# Patient Record
Sex: FEMALE | Race: BLACK OR AFRICAN AMERICAN | Employment: OTHER | URBAN - METROPOLITAN AREA
[De-identification: names, ages, dates, MRNs, and addresses within clinical notes are randomized per-mention and may not be internally consistent; named-entity substitution may affect disease eponyms.]

---

## 2017-01-01 ENCOUNTER — HOSPITAL ENCOUNTER (EMERGENCY)
Facility: HOSPITAL | Age: 42
Discharge: HOME | End: 2017-01-01
Attending: EMERGENCY MEDICINE

## 2017-01-01 ENCOUNTER — APPOINTMENT (OUTPATIENT)
Dept: GENERAL RADIOLOGY | Facility: HOSPITAL | Age: 42
End: 2017-01-01
Attending: EMERGENCY MEDICINE

## 2017-01-01 ENCOUNTER — APPOINTMENT (OUTPATIENT)
Dept: CT IMAGING | Facility: HOSPITAL | Age: 42
End: 2017-01-01
Attending: EMERGENCY MEDICINE

## 2017-01-01 VITALS
HEART RATE: 116 BPM | RESPIRATION RATE: 29 BRPM | BODY MASS INDEX: 37.98 KG/M2 | SYSTOLIC BLOOD PRESSURE: 119 MMHG | HEIGHT: 67 IN | TEMPERATURE: 98 F | OXYGEN SATURATION: 93 % | DIASTOLIC BLOOD PRESSURE: 58 MMHG | WEIGHT: 242 LBS

## 2017-01-01 DIAGNOSIS — R07.89 CHEST WALL PAIN: Primary | ICD-10-CM

## 2017-01-01 DIAGNOSIS — D50.9 IRON DEFICIENCY ANEMIA, UNSPECIFIED IRON DEFICIENCY ANEMIA TYPE: ICD-10-CM

## 2017-01-01 LAB
ANION GAP SERPL CALC-SCNC: 8 MMOL/L (ref 0–18)
BASOPHILS # BLD: 0.1 K/UL (ref 0–0.2)
BASOPHILS NFR BLD: 1 %
BUN SERPL-MCNC: 8 MG/DL (ref 8–20)
BUN/CREAT SERPL: 11 (ref 10–20)
CALCIUM SERPL-MCNC: 9 MG/DL (ref 8.5–10.5)
CHLORIDE SERPL-SCNC: 104 MMOL/L (ref 95–110)
CO2 SERPL-SCNC: 27 MMOL/L (ref 22–32)
CREAT SERPL-MCNC: 0.73 MG/DL (ref 0.5–1.5)
D DIMER PPP FEU-MCNC: 1.79 MCG/ML
EOSINOPHIL # BLD: 0.1 K/UL (ref 0–0.7)
EOSINOPHIL NFR BLD: 2 %
ERYTHROCYTE [DISTWIDTH] IN BLOOD BY AUTOMATED COUNT: 15.2 % (ref 11–15)
GLUCOSE SERPL-MCNC: 101 MG/DL (ref 70–99)
HCT VFR BLD AUTO: 26.9 % (ref 35–48)
HGB BLD-MCNC: 8.8 G/DL (ref 12–16)
LYMPHOCYTES # BLD: 1.7 K/UL (ref 1–4)
LYMPHOCYTES NFR BLD: 27 %
MCH RBC QN AUTO: 28.3 PG (ref 27–32)
MCHC RBC AUTO-ENTMCNC: 32.6 G/DL (ref 32–37)
MCV RBC AUTO: 86.7 FL (ref 80–100)
MONOCYTES # BLD: 0.4 K/UL (ref 0–1)
MONOCYTES NFR BLD: 7 %
NEUTROPHILS # BLD AUTO: 4 K/UL (ref 1.8–7.7)
NEUTROPHILS NFR BLD: 63 %
OSMOLALITY UR CALC.SUM OF ELEC: 286 MOSM/KG (ref 275–295)
PLATELET # BLD AUTO: 231 K/UL (ref 140–400)
PMV BLD AUTO: 8.3 FL (ref 7.4–10.3)
POTASSIUM SERPL-SCNC: 4 MMOL/L (ref 3.3–5.1)
RBC # BLD AUTO: 3.1 M/UL (ref 3.7–5.4)
SODIUM SERPL-SCNC: 139 MMOL/L (ref 136–144)
TROPONIN I SERPL-MCNC: 0 NG/ML (ref ?–0.03)
WBC # BLD AUTO: 6.3 K/UL (ref 4–11)

## 2017-01-01 PROCEDURE — 93005 ELECTROCARDIOGRAM TRACING: CPT

## 2017-01-01 PROCEDURE — 80048 BASIC METABOLIC PNL TOTAL CA: CPT | Performed by: EMERGENCY MEDICINE

## 2017-01-01 PROCEDURE — 96374 THER/PROPH/DIAG INJ IV PUSH: CPT

## 2017-01-01 PROCEDURE — 71020 XR CHEST PA + LAT CHEST (CPT=71020): CPT

## 2017-01-01 PROCEDURE — 85025 COMPLETE CBC W/AUTO DIFF WBC: CPT | Performed by: EMERGENCY MEDICINE

## 2017-01-01 PROCEDURE — 93010 ELECTROCARDIOGRAM REPORT: CPT | Performed by: EMERGENCY MEDICINE

## 2017-01-01 PROCEDURE — 99285 EMERGENCY DEPT VISIT HI MDM: CPT

## 2017-01-01 PROCEDURE — 71260 CT THORAX DX C+: CPT

## 2017-01-01 PROCEDURE — 84484 ASSAY OF TROPONIN QUANT: CPT | Performed by: EMERGENCY MEDICINE

## 2017-01-01 PROCEDURE — 85379 FIBRIN DEGRADATION QUANT: CPT | Performed by: EMERGENCY MEDICINE

## 2017-01-01 RX ORDER — KETOROLAC TROMETHAMINE 30 MG/ML
30 INJECTION, SOLUTION INTRAMUSCULAR; INTRAVENOUS ONCE
Status: COMPLETED | OUTPATIENT
Start: 2017-01-01 | End: 2017-01-01

## 2017-01-01 RX ORDER — NAPROXEN 500 MG/1
500 TABLET ORAL 2 TIMES DAILY PRN
Qty: 20 TABLET | Refills: 0 | Status: SHIPPED | OUTPATIENT
Start: 2017-01-01 | End: 2017-01-08

## 2017-01-01 NOTE — ED NOTES
Lab draw unsuccessful (ERT Maryln Rota attempted).  Pt assigned to chairs outside of T3 - instructed to alert RN if feeling worse in any way

## 2017-01-01 NOTE — ED PROVIDER NOTES
Patient presents with:  Chest Pain Angina (cardiovascular)      HPI:     Jonas Salgado is a 39year old female presents for a chief complaint of chest pain x 2 days.   Exacerbating factors include nothing  Relieving factors include ibuprofen  Pain is loca 100%  LMP 10/15/2016    Constitutional: NAD, awake and alert  HENT: mmm, no lesions,  Neck: normal range of motion, no tenderness, supple. No JVD  Eyes: PERRL, EOMI, conjunctiva normal, no discharge. Sclera anicteric.   Cardiovascular: Normal heart rate, no Andi    Cardiac Monitor: Pulse Readings from Last 1 Encounters:  01/01/17 : 75  , sinus     Radiology findings:   Xr Chest Pa + Lat Chest (cpt=71020)    1/1/2017  CONCLUSION: Normal examination. Pt's pain improved after toradol.   Pt currently on iro

## 2017-01-01 NOTE — ED INITIAL ASSESSMENT (HPI)
C/o pain across upper chest, back & shoulders intermittent since Thursday night but worsening last night  Hx of miscarriage on 16 -  was approx 8 weeks pregnant   States pain is positional - exacerbates when she turns her neck from side-to-side

## 2017-02-03 ENCOUNTER — OFFICE VISIT (OUTPATIENT)
Dept: INTERNAL MEDICINE CLINIC | Facility: CLINIC | Age: 42
End: 2017-02-03

## 2017-02-03 VITALS
DIASTOLIC BLOOD PRESSURE: 60 MMHG | OXYGEN SATURATION: 97 % | SYSTOLIC BLOOD PRESSURE: 110 MMHG | TEMPERATURE: 98 F | RESPIRATION RATE: 17 BRPM | WEIGHT: 246 LBS | HEIGHT: 65.5 IN | HEART RATE: 82 BPM | BODY MASS INDEX: 40.49 KG/M2

## 2017-02-03 DIAGNOSIS — B00.1 HERPES SIMPLEX LABIALIS: Primary | ICD-10-CM

## 2017-02-03 DIAGNOSIS — Z20.2 POSSIBLE EXPOSURE TO STD: ICD-10-CM

## 2017-02-03 DIAGNOSIS — E66.01 MORBID OBESITY DUE TO EXCESS CALORIES (HCC): ICD-10-CM

## 2017-02-03 DIAGNOSIS — S89.91XA KNEE INJURY, RIGHT, INITIAL ENCOUNTER: ICD-10-CM

## 2017-02-03 PROCEDURE — 99214 OFFICE O/P EST MOD 30 MIN: CPT | Performed by: INTERNAL MEDICINE

## 2017-02-03 PROCEDURE — 87491 CHLMYD TRACH DNA AMP PROBE: CPT | Performed by: INTERNAL MEDICINE

## 2017-02-03 PROCEDURE — 36415 COLL VENOUS BLD VENIPUNCTURE: CPT | Performed by: INTERNAL MEDICINE

## 2017-02-03 PROCEDURE — 86780 TREPONEMA PALLIDUM: CPT | Performed by: INTERNAL MEDICINE

## 2017-02-03 PROCEDURE — 87389 HIV-1 AG W/HIV-1&-2 AB AG IA: CPT | Performed by: INTERNAL MEDICINE

## 2017-02-03 PROCEDURE — 87591 N.GONORRHOEAE DNA AMP PROB: CPT | Performed by: INTERNAL MEDICINE

## 2017-02-03 RX ORDER — IBUPROFEN 800 MG/1
800 TABLET ORAL EVERY 6 HOURS PRN
Qty: 45 TABLET | Refills: 3 | Status: SHIPPED | OUTPATIENT
Start: 2017-02-03 | End: 2017-07-21 | Stop reason: ALTCHOICE

## 2017-02-03 RX ORDER — ACYCLOVIR 200 MG/1
200 CAPSULE ORAL
Qty: 25 CAPSULE | Refills: 0 | Status: SHIPPED | OUTPATIENT
Start: 2017-02-03 | End: 2017-07-21

## 2017-02-03 RX ORDER — FAMOTIDINE 20 MG/1
20 TABLET ORAL 2 TIMES DAILY
Qty: 20 TABLET | Refills: 0 | Status: SHIPPED | OUTPATIENT
Start: 2017-02-03 | End: 2018-01-22

## 2017-02-03 NOTE — PROGRESS NOTES
Non fasting blood draw administered in left arm   Draw successful   Guilherme:  HIV,TREP,GC  D. O.B verified   Ordering Dr: Swapnil Thayer

## 2017-02-03 NOTE — PROGRESS NOTES
HPI:    Patient ID: Alison Alcocer is a 39year old female. Knee Pain   The pain is present in the right knee. This is a new problem. Episode onset: 8 weeks. History of extremity trauma: occurred after running. The problem occurs constantly.  The proble EXAM:   Physical Exam   Constitutional: She is oriented to person, place, and time. She appears well-developed and well-nourished. Morbidly obese. HENT:   Head: Normocephalic. Blister  Lower lip Left.     Eyes: Conjunctivae are normal. Pupils are equ Referrals:  BARIATRICS - INTERNAL  ORTHOPEDIC - INTERNAL  XR KNEE (1 OR 2 VIEWS), RIGHT (CPT=73560)       OX#[id number]

## 2017-02-05 LAB
C TRACH DNA SPEC QL NAA+PROBE: NEGATIVE
N GONORRHOEA DNA SPEC QL NAA+PROBE: NEGATIVE

## 2017-02-06 LAB
HIV1+2 AB SERPL QL IA: NONREACTIVE
T PALLIDUM AB SER QL: NEGATIVE

## 2017-03-20 ENCOUNTER — OFFICE VISIT (OUTPATIENT)
Dept: PHYSICAL THERAPY | Facility: HOSPITAL | Age: 42
End: 2017-03-20
Attending: INTERNAL MEDICINE
Payer: MEDICARE

## 2017-03-20 DIAGNOSIS — M22.2X1 PATELLOFEMORAL SYNDROME, RIGHT: Primary | ICD-10-CM

## 2017-03-20 PROCEDURE — 97110 THERAPEUTIC EXERCISES: CPT

## 2017-03-20 PROCEDURE — 97161 PT EVAL LOW COMPLEX 20 MIN: CPT

## 2017-03-20 NOTE — PROGRESS NOTES
KNEE EVALUATION:   Referring Physician: Dr. Komal Webb  DX:  Patellofemoral syndrome, right (M22.2X1)      Date of Service: 3/20/2017     PATIENT SUMMARY:   Jackie Washington is a 39year old y/o female who presents to therapy today  with complaints of pain Gait:  slightly limping gait with decreased WB R LE. Palpation: TTP medial hamstring and LCL. Patellat orientation: R: lateral glide/lateral tilt, inferior tilt. Observation:  Edema: L Knee suprapatella: 54.5 cm.   R knee suprapatella: 57 cm   Sens period. Treatment will include: Gait training, Manual Therapy, Neuromuscular Re-education, Therapeutic Activities, Therapeutic Exercise, Home Exercise Program instruction and Modalities to include: Electrical stimulation (unattended) PRN.     Education or

## 2017-03-23 ENCOUNTER — OFFICE VISIT (OUTPATIENT)
Dept: PHYSICAL THERAPY | Facility: HOSPITAL | Age: 42
End: 2017-03-23
Attending: INTERNAL MEDICINE
Payer: MEDICARE

## 2017-03-23 DIAGNOSIS — M22.2X1 PATELLOFEMORAL SYNDROME, RIGHT: Primary | ICD-10-CM

## 2017-03-23 PROCEDURE — 97110 THERAPEUTIC EXERCISES: CPT

## 2017-03-23 NOTE — PROGRESS NOTES
DX:  Patellofemoral syndrome, right (M22.2X1)  Authorized # of Visits:  2/8 (per POC)         Next MD visit: none scheduled  Fall Risk: standard         Precautions: n/a         Medication Changes since last visit?: No  Subjective: Pt states that she could

## 2017-03-28 ENCOUNTER — OFFICE VISIT (OUTPATIENT)
Dept: PHYSICAL THERAPY | Facility: HOSPITAL | Age: 42
End: 2017-03-28
Attending: INTERNAL MEDICINE
Payer: MEDICARE

## 2017-03-28 DIAGNOSIS — M22.2X1 PATELLOFEMORAL SYNDROME, RIGHT: Primary | ICD-10-CM

## 2017-03-28 PROCEDURE — 97110 THERAPEUTIC EXERCISES: CPT

## 2017-03-28 NOTE — PROGRESS NOTES
DX:  Patellofemoral syndrome, right (M22.2X1)  Authorized # of Visits:  3/8 (per POC)         Next MD visit: none scheduled  Fall Risk: standard         Precautions: n/a         Medication Changes since last visit?: No  Subjective: Pt states that her knee

## 2017-03-30 ENCOUNTER — APPOINTMENT (OUTPATIENT)
Dept: PHYSICAL THERAPY | Facility: HOSPITAL | Age: 42
End: 2017-03-30
Attending: INTERNAL MEDICINE
Payer: MEDICARE

## 2017-04-03 ENCOUNTER — OFFICE VISIT (OUTPATIENT)
Dept: PHYSICAL THERAPY | Facility: HOSPITAL | Age: 42
End: 2017-04-03
Attending: INTERNAL MEDICINE
Payer: MEDICARE

## 2017-04-03 DIAGNOSIS — M22.2X1 PATELLOFEMORAL SYNDROME, RIGHT: Primary | ICD-10-CM

## 2017-04-03 PROCEDURE — 97110 THERAPEUTIC EXERCISES: CPT

## 2017-04-03 NOTE — PROGRESS NOTES
DX:  Patellofemoral syndrome, right (M22.2X1)  Authorized # of Visits:  4/8 (per POC)         Next MD visit: none scheduled  Fall Risk: standard         Precautions: n/a         Medication Changes since last visit?: No  Subjective: Pt states that she only

## 2017-04-05 ENCOUNTER — APPOINTMENT (OUTPATIENT)
Dept: PHYSICAL THERAPY | Facility: HOSPITAL | Age: 42
End: 2017-04-05
Attending: INTERNAL MEDICINE
Payer: MEDICARE

## 2017-04-11 ENCOUNTER — OFFICE VISIT (OUTPATIENT)
Dept: PHYSICAL THERAPY | Facility: HOSPITAL | Age: 42
End: 2017-04-11
Attending: INTERNAL MEDICINE
Payer: MEDICARE

## 2017-04-11 DIAGNOSIS — M22.2X1 PATELLOFEMORAL SYNDROME, RIGHT: Primary | ICD-10-CM

## 2017-04-11 PROCEDURE — 97110 THERAPEUTIC EXERCISES: CPT

## 2017-04-11 NOTE — PROGRESS NOTES
Patient Name: Brenda Hurd, : 1975, MRN: [id number]   Date:  2017  Referring Physician:  Roasura Sandhu    Progress Summary    DX:  Patellofemoral syndrome, right (M22.2X1)  Authorized # of Visits:   (per POC)         Next MD visit: plan static stance x 60\" -NOT TODAY  Level 1 rockerboard r/l x 15   Red TB side stepping r/l x 30'  Standing hip ext with red TB x 15 r/l   Standing hip abd with red theraband x 15   Sleeping posture with pillow to avoid morning knee pain x 4 min     HEP issue Therapy 1-2 x/week or a total of 3 visits over a 90 day period. Treatment will include: therapeutic ex, gait tr, manual therapy as needed, neuromuscular re education, and progressive HEP.         Patient was advised of these findings, precautions, and treat

## 2017-04-13 ENCOUNTER — APPOINTMENT (OUTPATIENT)
Dept: PHYSICAL THERAPY | Facility: HOSPITAL | Age: 42
End: 2017-04-13
Attending: INTERNAL MEDICINE
Payer: MEDICARE

## 2017-04-19 ENCOUNTER — OFFICE VISIT (OUTPATIENT)
Dept: PHYSICAL THERAPY | Facility: HOSPITAL | Age: 42
End: 2017-04-19
Payer: MEDICARE

## 2017-04-19 PROCEDURE — 97110 THERAPEUTIC EXERCISES: CPT

## 2017-05-01 ENCOUNTER — OFFICE VISIT (OUTPATIENT)
Dept: PHYSICAL THERAPY | Facility: HOSPITAL | Age: 42
End: 2017-05-01
Payer: MEDICARE

## 2017-05-01 PROCEDURE — 97110 THERAPEUTIC EXERCISES: CPT

## 2017-06-14 ENCOUNTER — OFFICE VISIT (OUTPATIENT)
Dept: PODIATRY CLINIC | Facility: CLINIC | Age: 42
End: 2017-06-14

## 2017-06-14 DIAGNOSIS — M72.2 PLANTAR FASCIITIS, BILATERAL: Primary | ICD-10-CM

## 2017-06-14 PROCEDURE — G0463 HOSPITAL OUTPT CLINIC VISIT: HCPCS | Performed by: PODIATRIST

## 2017-06-14 PROCEDURE — 99203 OFFICE O/P NEW LOW 30 MIN: CPT | Performed by: PODIATRIST

## 2017-06-14 PROCEDURE — L3060 FOOT ARCH SUPP LONGITUD/META: HCPCS | Performed by: PODIATRIST

## 2017-06-14 RX ORDER — IBUPROFEN 600 MG/1
600 TABLET ORAL
Qty: 60 TABLET | Refills: 1 | Status: SHIPPED | OUTPATIENT
Start: 2017-06-14 | End: 2017-07-21

## 2017-06-14 RX ORDER — FERROUS SULFATE 325(65) MG
TABLET ORAL
Refills: 2 | COMMUNITY
Start: 2017-04-06 | End: 2019-06-25 | Stop reason: ALTCHOICE

## 2017-06-14 NOTE — PROGRESS NOTES
HPI:    Patient ID: Marilyn Strickland is a 43year old female. HPI  This pleasant 55-year-old female presents as a new patient to me and is referred by her sister. Chief complaint is heel pain of both feet.   The pain is been present for at least 4 months to wear shoes at all times meaning no barefoot walking. She will also take ibuprofen 600 mg 3 times a day with meals. I reviewed the use of the medication my expectations. She was instructed to ice twice every evening for 15 minutes.   I do not see hai

## 2017-07-21 ENCOUNTER — OFFICE VISIT (OUTPATIENT)
Dept: INTERNAL MEDICINE CLINIC | Facility: CLINIC | Age: 42
End: 2017-07-21

## 2017-07-21 VITALS
RESPIRATION RATE: 16 BRPM | BODY MASS INDEX: 40.99 KG/M2 | SYSTOLIC BLOOD PRESSURE: 130 MMHG | OXYGEN SATURATION: 96 % | DIASTOLIC BLOOD PRESSURE: 62 MMHG | HEART RATE: 60 BPM | TEMPERATURE: 98 F | WEIGHT: 249 LBS | HEIGHT: 65.5 IN

## 2017-07-21 DIAGNOSIS — R53.83 FATIGUE, UNSPECIFIED TYPE: Primary | ICD-10-CM

## 2017-07-21 DIAGNOSIS — R63.1 POLYDIPSIA: ICD-10-CM

## 2017-07-21 DIAGNOSIS — F41.0 ANXIETY ATTACK: ICD-10-CM

## 2017-07-21 DIAGNOSIS — B00.1 HERPES SIMPLEX LABIALIS: ICD-10-CM

## 2017-07-21 DIAGNOSIS — R73.03 PREDIABETES: ICD-10-CM

## 2017-07-21 DIAGNOSIS — D50.9 IRON DEFICIENCY ANEMIA, UNSPECIFIED IRON DEFICIENCY ANEMIA TYPE: ICD-10-CM

## 2017-07-21 LAB
ALBUMIN SERPL BCP-MCNC: 3.7 G/DL (ref 3.5–4.8)
ALBUMIN/GLOB SERPL: 1.1 {RATIO} (ref 1–2)
ALP SERPL-CCNC: 53 U/L (ref 32–100)
ALT SERPL-CCNC: 11 U/L (ref 14–54)
ANION GAP SERPL CALC-SCNC: 6 MMOL/L (ref 0–18)
AST SERPL-CCNC: 20 U/L (ref 15–41)
BASOPHILS # BLD: 0 K/UL (ref 0–0.2)
BASOPHILS NFR BLD: 1 %
BILIRUB SERPL-MCNC: 0.5 MG/DL (ref 0.3–1.2)
BUN SERPL-MCNC: 12 MG/DL (ref 8–20)
BUN/CREAT SERPL: 17.1 (ref 10–20)
CALCIUM SERPL-MCNC: 9 MG/DL (ref 8.5–10.5)
CHLORIDE SERPL-SCNC: 106 MMOL/L (ref 95–110)
CO2 SERPL-SCNC: 22 MMOL/L (ref 22–32)
CREAT SERPL-MCNC: 0.7 MG/DL (ref 0.5–1.5)
EOSINOPHIL # BLD: 0.1 K/UL (ref 0–0.7)
EOSINOPHIL NFR BLD: 2 %
ERYTHROCYTE [DISTWIDTH] IN BLOOD BY AUTOMATED COUNT: 15.9 % (ref 11–15)
GLOBULIN PLAS-MCNC: 3.3 G/DL (ref 2.5–3.7)
GLUCOSE SERPL-MCNC: 79 MG/DL (ref 70–99)
HCT VFR BLD AUTO: 35.2 % (ref 35–48)
HGB BLD-MCNC: 11.4 G/DL (ref 12–16)
LYMPHOCYTES # BLD: 1.4 K/UL (ref 1–4)
LYMPHOCYTES NFR BLD: 32 %
MCH RBC QN AUTO: 27.6 PG (ref 27–32)
MCHC RBC AUTO-ENTMCNC: 32.4 G/DL (ref 32–37)
MCV RBC AUTO: 85.3 FL (ref 80–100)
MONOCYTES # BLD: 0.4 K/UL (ref 0–1)
MONOCYTES NFR BLD: 10 %
NEUTROPHILS # BLD AUTO: 2.4 K/UL (ref 1.8–7.7)
NEUTROPHILS NFR BLD: 56 %
OSMOLALITY UR CALC.SUM OF ELEC: 277 MOSM/KG (ref 275–295)
PLATELET # BLD AUTO: 232 K/UL (ref 140–400)
PMV BLD AUTO: 10.2 FL (ref 7.4–10.3)
POTASSIUM SERPL-SCNC: 3.7 MMOL/L (ref 3.3–5.1)
PROT SERPL-MCNC: 7 G/DL (ref 5.9–8.4)
RBC # BLD AUTO: 4.12 M/UL (ref 3.7–5.4)
SODIUM SERPL-SCNC: 134 MMOL/L (ref 136–144)
TSH SERPL-ACNC: 0.48 UIU/ML (ref 0.45–5.33)
WBC # BLD AUTO: 4.3 K/UL (ref 4–11)

## 2017-07-21 PROCEDURE — 84443 ASSAY THYROID STIM HORMONE: CPT | Performed by: INTERNAL MEDICINE

## 2017-07-21 PROCEDURE — 85025 COMPLETE CBC W/AUTO DIFF WBC: CPT | Performed by: INTERNAL MEDICINE

## 2017-07-21 PROCEDURE — 83036 HEMOGLOBIN GLYCOSYLATED A1C: CPT | Performed by: INTERNAL MEDICINE

## 2017-07-21 PROCEDURE — 36415 COLL VENOUS BLD VENIPUNCTURE: CPT | Performed by: INTERNAL MEDICINE

## 2017-07-21 PROCEDURE — 99214 OFFICE O/P EST MOD 30 MIN: CPT | Performed by: INTERNAL MEDICINE

## 2017-07-21 PROCEDURE — 80053 COMPREHEN METABOLIC PANEL: CPT | Performed by: INTERNAL MEDICINE

## 2017-07-21 RX ORDER — ACYCLOVIR 200 MG/1
200 CAPSULE ORAL
Qty: 25 CAPSULE | Refills: 0 | Status: SHIPPED | OUTPATIENT
Start: 2017-07-21 | End: 2018-01-22

## 2017-07-21 RX ORDER — ALPRAZOLAM 0.5 MG/1
0.25 TABLET ORAL NIGHTLY PRN
Qty: 30 TABLET | Refills: 1 | Status: SHIPPED | OUTPATIENT
Start: 2017-07-21 | End: 2018-01-22

## 2017-07-21 NOTE — PROGRESS NOTES
Patient presented in office today for fasting blood draw. Blood draw successful in left arm  Guilherme:  Cbc,cmp,lipid,tsh,b-12,iron,folate,A1C  D. O.B verified   Orders per Doctor Co

## 2017-07-21 NOTE — PROGRESS NOTES
HPI:    Patient ID: Francisco J Watson is a 43year old female. HPI     Feeling fatigue. Eating a lot of ice averaging 4 cups daily. H/o iron def anemia in the past.. Denies menorrhagia. Takes Ibuprofen for fascitis occasionally.  Reports no abdominal pain, Neck supple. No JVD present. No thyromegaly present. Cardiovascular: Normal rate, regular rhythm and normal heart sounds. Pulmonary/Chest: Effort normal and breath sounds normal.   Abdominal: Soft.  Bowel sounds are normal.   Neurological: She is alert

## 2017-07-22 LAB — HBA1C MFR BLD: 5.8 % (ref 4–6)

## 2017-08-24 ENCOUNTER — OFFICE VISIT (OUTPATIENT)
Dept: SURGERY | Facility: CLINIC | Age: 42
End: 2017-08-24

## 2017-08-24 VITALS
BODY MASS INDEX: 40.39 KG/M2 | HEART RATE: 67 BPM | HEIGHT: 65.6 IN | SYSTOLIC BLOOD PRESSURE: 108 MMHG | RESPIRATION RATE: 16 BRPM | DIASTOLIC BLOOD PRESSURE: 68 MMHG | OXYGEN SATURATION: 99 % | WEIGHT: 248.31 LBS

## 2017-08-24 DIAGNOSIS — E66.01 MORBID OBESITY WITH BMI OF 40.0-44.9, ADULT (HCC): ICD-10-CM

## 2017-08-24 DIAGNOSIS — R53.82 CHRONIC FATIGUE: ICD-10-CM

## 2017-08-24 DIAGNOSIS — F41.9 ANXIETY: ICD-10-CM

## 2017-08-24 DIAGNOSIS — E55.9 VITAMIN D DEFICIENCY: ICD-10-CM

## 2017-08-24 DIAGNOSIS — D50.9 IRON DEFICIENCY ANEMIA, UNSPECIFIED IRON DEFICIENCY ANEMIA TYPE: Primary | ICD-10-CM

## 2017-08-24 DIAGNOSIS — M17.0 PRIMARY OSTEOARTHRITIS OF BOTH KNEES: ICD-10-CM

## 2017-08-24 DIAGNOSIS — R06.83 SNORING: ICD-10-CM

## 2017-08-24 PROBLEM — M17.9 DJD (DEGENERATIVE JOINT DISEASE) OF KNEE: Status: ACTIVE | Noted: 2017-08-24

## 2017-08-24 PROBLEM — M17.10 DJD (DEGENERATIVE JOINT DISEASE) OF KNEE: Status: ACTIVE | Noted: 2017-08-24

## 2017-08-24 PROCEDURE — 99214 OFFICE O/P EST MOD 30 MIN: CPT | Performed by: INTERNAL MEDICINE

## 2017-08-24 NOTE — PROGRESS NOTES
[facility] Consultation Note       Date of Consult:  2017    Patient:  Damien Talbot  :      1975  MRN:      [id number]    Referring Provider: Dr. Nicola Swain       Chief Complaint:  Patient presents with:  Consult  Weight Managem Comorbidities:  snoring    Social History:      Social History  Social History   Marital status: Single  Spouse name: N/A    Years of education: N/A  Number of children: N/A     Occupational History  None on file     Social History Main Topics   Smokin negative  Behavioral/Psych: positive for anxiety  All other systems were reviewed and are negative.     Physical Exam:  General appearance: alert, appears stated age and cooperative  Head: Normocephalic, without obvious abnormality, atraumatic  Eyes: conjun

## 2017-08-28 ENCOUNTER — APPOINTMENT (OUTPATIENT)
Dept: LAB | Facility: HOSPITAL | Age: 42
End: 2017-08-28
Attending: INTERNAL MEDICINE
Payer: MEDICARE

## 2017-08-28 DIAGNOSIS — E66.01 MORBID OBESITY WITH BMI OF 40.0-44.9, ADULT (HCC): ICD-10-CM

## 2017-08-28 DIAGNOSIS — E55.9 VITAMIN D DEFICIENCY: ICD-10-CM

## 2017-08-28 DIAGNOSIS — R53.83 FATIGUE, UNSPECIFIED TYPE: ICD-10-CM

## 2017-08-28 DIAGNOSIS — R53.82 CHRONIC FATIGUE: ICD-10-CM

## 2017-08-28 DIAGNOSIS — M17.0 PRIMARY OSTEOARTHRITIS OF BOTH KNEES: ICD-10-CM

## 2017-08-28 DIAGNOSIS — R06.83 SNORING: ICD-10-CM

## 2017-08-28 DIAGNOSIS — D50.9 IRON DEFICIENCY ANEMIA, UNSPECIFIED IRON DEFICIENCY ANEMIA TYPE: ICD-10-CM

## 2017-08-28 LAB
25(OH)D3 SERPL-MCNC: 22.5 NG/ML
FOLATE SERPL-MCNC: 12.4 NG/ML
IRON SATN MFR SERPL: 5 % (ref 15–50)
IRON SERPL-MCNC: 23 MCG/DL (ref 28–170)
TIBC SERPL-MCNC: 458 MCG/DL (ref 228–428)
TRANSFERRIN SERPL-MCNC: 347 MG/DL (ref 192–382)
VIT B12 SERPL-MCNC: 635 PG/ML (ref 181–914)

## 2017-08-28 PROCEDURE — 82607 VITAMIN B-12: CPT

## 2017-08-28 PROCEDURE — 36415 COLL VENOUS BLD VENIPUNCTURE: CPT

## 2017-08-28 PROCEDURE — 82746 ASSAY OF FOLIC ACID SERUM: CPT

## 2017-08-28 PROCEDURE — 82306 VITAMIN D 25 HYDROXY: CPT

## 2017-08-28 PROCEDURE — 83540 ASSAY OF IRON: CPT

## 2017-08-28 PROCEDURE — 84466 ASSAY OF TRANSFERRIN: CPT

## 2017-09-01 RX ORDER — ERGOCALCIFEROL 1.25 MG/1
CAPSULE ORAL
Qty: 6 CAPSULE | Refills: 0 | Status: SHIPPED | OUTPATIENT
Start: 2017-09-01 | End: 2019-06-30

## 2017-10-13 PROBLEM — F41.9 ANXIETY: Status: ACTIVE | Noted: 2017-10-13

## 2017-10-13 NOTE — PROGRESS NOTES
[address], [address] [address]  Dept: [phone]     Date:   10/13/2017    Patient:  Jovany Edmondson  :      1975  MRN:      [id number]    Chief Complaint: Smoker    Smokeless tobacco: Not on file    Alcohol use Yes  0.0 oz/week     Comment: wine    Drug use: No    Sexual activity: Not on file     Other Topics Concern   None on file     Social History Narrative   None on file     Surgical History:  Past Surgi oriented to person, place, and time. She appears well-developed and well-nourished. HENT:   Head: Normocephalic and atraumatic. Neck: Neck supple. Pulmonary/Chest: Effort normal.   Abdominal: Soft. Musculoskeletal: Normal range of motion.    Neurolo

## 2017-11-09 DIAGNOSIS — F41.9 ANXIETY: ICD-10-CM

## 2017-11-14 RX ORDER — ESCITALOPRAM OXALATE 10 MG/1
TABLET ORAL
Qty: 30 TABLET | Refills: 0 | Status: SHIPPED | OUTPATIENT
Start: 2017-11-14 | End: 2018-01-22

## 2017-12-05 ENCOUNTER — APPOINTMENT (OUTPATIENT)
Dept: GENERAL RADIOLOGY | Facility: HOSPITAL | Age: 42
End: 2017-12-05
Attending: NURSE PRACTITIONER
Payer: MEDICARE

## 2017-12-05 ENCOUNTER — HOSPITAL ENCOUNTER (EMERGENCY)
Facility: HOSPITAL | Age: 42
Discharge: HOME | End: 2017-12-05
Payer: MEDICARE

## 2017-12-05 VITALS
DIASTOLIC BLOOD PRESSURE: 66 MMHG | SYSTOLIC BLOOD PRESSURE: 112 MMHG | HEART RATE: 64 BPM | BODY MASS INDEX: 38.89 KG/M2 | HEIGHT: 66 IN | OXYGEN SATURATION: 99 % | RESPIRATION RATE: 18 BRPM | TEMPERATURE: 99 F | WEIGHT: 242 LBS

## 2017-12-05 DIAGNOSIS — S39.012A LUMBAR STRAIN, INITIAL ENCOUNTER: ICD-10-CM

## 2017-12-05 DIAGNOSIS — M92.62 HAGLUND'S DEFORMITY OF LEFT HEEL: ICD-10-CM

## 2017-12-05 DIAGNOSIS — S13.9XXA ACUTE SPRAIN OF LIGAMENT OF NECK, INITIAL ENCOUNTER: Primary | ICD-10-CM

## 2017-12-05 DIAGNOSIS — M79.672 FOOT PAIN, LEFT: ICD-10-CM

## 2017-12-05 PROCEDURE — 73630 X-RAY EXAM OF FOOT: CPT | Performed by: NURSE PRACTITIONER

## 2017-12-05 PROCEDURE — 99284 EMERGENCY DEPT VISIT MOD MDM: CPT

## 2017-12-05 PROCEDURE — 72100 X-RAY EXAM L-S SPINE 2/3 VWS: CPT | Performed by: NURSE PRACTITIONER

## 2017-12-05 PROCEDURE — 72040 X-RAY EXAM NECK SPINE 2-3 VW: CPT | Performed by: NURSE PRACTITIONER

## 2017-12-05 PROCEDURE — 81025 URINE PREGNANCY TEST: CPT

## 2017-12-05 RX ORDER — CYCLOBENZAPRINE HCL 10 MG
10 TABLET ORAL 3 TIMES DAILY PRN
Qty: 14 TABLET | Refills: 0 | Status: SHIPPED | OUTPATIENT
Start: 2017-12-05 | End: 2017-12-12

## 2017-12-05 RX ORDER — HYDROCODONE BITARTRATE AND ACETAMINOPHEN 5; 325 MG/1; MG/1
1-2 TABLET ORAL EVERY 4 HOURS PRN
Qty: 14 TABLET | Refills: 0 | Status: SHIPPED | OUTPATIENT
Start: 2017-12-05 | End: 2017-12-12
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
## 2018-01-22 ENCOUNTER — OFFICE VISIT (OUTPATIENT)
Dept: SURGERY | Facility: CLINIC | Age: 43
End: 2018-01-22

## 2018-01-22 VITALS
HEART RATE: 73 BPM | RESPIRATION RATE: 16 BRPM | DIASTOLIC BLOOD PRESSURE: 64 MMHG | HEIGHT: 65.6 IN | OXYGEN SATURATION: 99 % | WEIGHT: 243.44 LBS | SYSTOLIC BLOOD PRESSURE: 95 MMHG | BODY MASS INDEX: 39.59 KG/M2

## 2018-01-22 DIAGNOSIS — R06.83 SNORING: ICD-10-CM

## 2018-01-22 DIAGNOSIS — F41.9 ANXIETY: ICD-10-CM

## 2018-01-22 DIAGNOSIS — Z51.81 ENCOUNTER FOR THERAPEUTIC DRUG MONITORING: ICD-10-CM

## 2018-01-22 DIAGNOSIS — R53.82 CHRONIC FATIGUE: ICD-10-CM

## 2018-01-22 DIAGNOSIS — D50.9 IRON DEFICIENCY ANEMIA, UNSPECIFIED IRON DEFICIENCY ANEMIA TYPE: ICD-10-CM

## 2018-01-22 DIAGNOSIS — M17.0 PRIMARY OSTEOARTHRITIS OF BOTH KNEES: Primary | ICD-10-CM

## 2018-01-22 DIAGNOSIS — E66.9 OBESITY (BMI 35.0-39.9 WITHOUT COMORBIDITY): ICD-10-CM

## 2018-01-22 PROCEDURE — 99214 OFFICE O/P EST MOD 30 MIN: CPT | Performed by: NURSE PRACTITIONER

## 2018-01-22 NOTE — PROGRESS NOTES
[address], [facility]  [address] [facility] [address] [facility] [address]  Dept: [phone]     Date:   18    Patient:  Eva Blair  :      1975  MRN:      [id number]    Chief Complaint:  Pa Mother    • Diabetes Sister    • Other[other] [OTHER] Father    • Diabetes Father        Food Journal  · Reviewed and Discussed:       · Patient has a Food Journal?: no   · Patient is reading nutrition labels?   yes  · Average Caloric Intake:     · Average is normal. Judgment and thought content normal.   Vitals reviewed.     ASSESSMENT     Encounter Diagnosis(ses):   Primary osteoarthritis of both knees  (primary encounter diagnosis)  Snoring  Chronic fatigue  Anxiety  Encounter for therapeutic drug monitori

## 2018-06-15 ENCOUNTER — HOSPITAL ENCOUNTER (EMERGENCY)
Facility: HOSPITAL | Age: 43
Discharge: HOME | End: 2018-06-15
Attending: EMERGENCY MEDICINE
Payer: MEDICARE

## 2018-06-15 VITALS
BODY MASS INDEX: 37.67 KG/M2 | TEMPERATURE: 99 F | HEIGHT: 67 IN | RESPIRATION RATE: 17 BRPM | WEIGHT: 240 LBS | DIASTOLIC BLOOD PRESSURE: 65 MMHG | SYSTOLIC BLOOD PRESSURE: 107 MMHG | OXYGEN SATURATION: 99 % | HEART RATE: 88 BPM

## 2018-06-15 DIAGNOSIS — J06.9 UPPER RESPIRATORY TRACT INFECTION, UNSPECIFIED TYPE: Primary | ICD-10-CM

## 2018-06-15 DIAGNOSIS — J02.9 ACUTE VIRAL PHARYNGITIS: ICD-10-CM

## 2018-06-15 PROCEDURE — 87430 STREP A AG IA: CPT

## 2018-06-15 PROCEDURE — 99283 EMERGENCY DEPT VISIT LOW MDM: CPT

## 2018-06-15 RX ORDER — DEXAMETHASONE 6 MG/1
6 TABLET ORAL ONCE
Qty: 1 TABLET | Refills: 0 | Status: SHIPPED | OUTPATIENT
Start: 2018-06-15 | End: 2018-06-15

## 2018-06-15 RX ORDER — FLUTICASONE PROPIONATE 50 MCG
2 SPRAY, SUSPENSION (ML) NASAL DAILY
Qty: 16 G | Refills: 0 | Status: SHIPPED | OUTPATIENT
Start: 2018-06-15 | End: 2018-07-15

## 2018-06-15 RX ORDER — FEXOFENADINE HCL AND PSEUDOEPHEDRINE HCI 60; 120 MG/1; MG/1
1 TABLET, EXTENDED RELEASE ORAL 2 TIMES DAILY
Qty: 14 TABLET | Refills: 0 | Status: SHIPPED | OUTPATIENT
Start: 2018-06-15 | End: 2018-06-22

## 2018-06-15 NOTE — ED INITIAL ASSESSMENT (HPI)
C/o nasal congestion, sore throat, bilateral ear pain and migraine headache - minimal relief with Ibuprofen. Symptoms began 3 days ago.

## 2018-06-15 NOTE — ED PROVIDER NOTES
Patient Seen in: [facility] AND [facility] Emergency Department    History   Patient presents with:  Sore Throat    Stated Complaint: Sore throat, ear pain, headache, congestion for 3 days    HPI    55-year-old female patient presents her complaining of mild cou Normal respiratory effort, clear lungs  Abdomen: Soft,  nondistended, non tender  : No CVA tenderness  Skin: No rash, no lesions  Musculoskeletal: Symmetric, no deformity, no injuries  Neuro: Normal speech, nonfocal examination  Psych: Appropriate, not a

## 2019-01-03 ENCOUNTER — HOSPITAL ENCOUNTER (EMERGENCY)
Facility: HOSPITAL | Age: 44
Discharge: HOME | End: 2019-01-03
Attending: EMERGENCY MEDICINE
Payer: COMMERCIAL

## 2019-01-03 ENCOUNTER — APPOINTMENT (OUTPATIENT)
Dept: GENERAL RADIOLOGY | Facility: HOSPITAL | Age: 44
End: 2019-01-03
Payer: COMMERCIAL

## 2019-01-03 VITALS
BODY MASS INDEX: 40.18 KG/M2 | SYSTOLIC BLOOD PRESSURE: 94 MMHG | OXYGEN SATURATION: 99 % | HEIGHT: 66 IN | HEART RATE: 50 BPM | RESPIRATION RATE: 18 BRPM | WEIGHT: 250 LBS | DIASTOLIC BLOOD PRESSURE: 62 MMHG | TEMPERATURE: 98 F

## 2019-01-03 DIAGNOSIS — S86.911A KNEE STRAIN, RIGHT, INITIAL ENCOUNTER: ICD-10-CM

## 2019-01-03 DIAGNOSIS — S16.1XXA STRAIN OF NECK MUSCLE, INITIAL ENCOUNTER: ICD-10-CM

## 2019-01-03 DIAGNOSIS — S39.012A STRAIN OF LUMBAR REGION, INITIAL ENCOUNTER: ICD-10-CM
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
## 2019-01-03 PROCEDURE — 99283 EMERGENCY DEPT VISIT LOW MDM: CPT

## 2019-01-03 PROCEDURE — 73560 X-RAY EXAM OF KNEE 1 OR 2: CPT

## 2019-01-03 RX ORDER — IBUPROFEN 800 MG/1
800 TABLET ORAL ONCE
Status: COMPLETED | OUTPATIENT
Start: 2019-01-03 | End: 2019-01-03

## 2019-01-03 RX ORDER — NAPROXEN 500 MG/1
500 TABLET ORAL 2 TIMES DAILY PRN
Qty: 15 TABLET | Refills: 0 | Status: SHIPPED | OUTPATIENT
Start: 2019-01-03 | End: 2019-01-08 | Stop reason: ALTCHOICE

## 2019-01-03 RX ORDER — CYCLOBENZAPRINE HCL 10 MG
10 TABLET ORAL EVERY 8 HOURS PRN
Qty: 10 TABLET | Refills: 0 | Status: SHIPPED | OUTPATIENT
Start: 2019-01-03 | End: 2019-01-08
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
## 2019-01-08 ENCOUNTER — OFFICE VISIT (OUTPATIENT)
Dept: INTERNAL MEDICINE CLINIC | Facility: CLINIC | Age: 44
End: 2019-01-08
Payer: MEDICARE

## 2019-01-08 VITALS
TEMPERATURE: 98 F | DIASTOLIC BLOOD PRESSURE: 80 MMHG | BODY MASS INDEX: 41.15 KG/M2 | RESPIRATION RATE: 19 BRPM | HEART RATE: 64 BPM | SYSTOLIC BLOOD PRESSURE: 130 MMHG | OXYGEN SATURATION: 100 % | HEIGHT: 65.6 IN | WEIGHT: 253 LBS

## 2019-01-08 DIAGNOSIS — S13.4XXA WHIPLASH INJURY TO NECK, INITIAL ENCOUNTER: ICD-10-CM

## 2019-01-08 DIAGNOSIS — S89.91XD INJURY OF RIGHT KNEE, SUBSEQUENT ENCOUNTER: Primary | ICD-10-CM

## 2019-01-08 DIAGNOSIS — F41.9 ANXIETY: ICD-10-CM

## 2019-01-08 DIAGNOSIS — S39.012A STRAIN OF LUMBAR REGION, INITIAL ENCOUNTER: ICD-10-CM
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
## 2019-01-08 PROCEDURE — 99214 OFFICE O/P EST MOD 30 MIN: CPT | Performed by: INTERNAL MEDICINE

## 2019-01-08 RX ORDER — HYDROCODONE BITARTRATE AND ACETAMINOPHEN 5; 325 MG/1; MG/1
TABLET ORAL
Qty: 40 TABLET | Refills: 0 | Status: SHIPPED | OUTPATIENT
Start: 2019-01-08 | End: 2019-06-25 | Stop reason: ALTCHOICE

## 2019-01-08 RX ORDER — IBUPROFEN 800 MG/1
800 TABLET ORAL EVERY 6 HOURS PRN
Qty: 60 TABLET | Refills: 0 | Status: SHIPPED | OUTPATIENT
Start: 2019-01-08 | End: 2019-06-20 | Stop reason: ALTCHOICE

## 2019-01-08 RX ORDER — CYCLOBENZAPRINE HCL 10 MG
10 TABLET ORAL EVERY 8 HOURS PRN
Qty: 30 TABLET | Refills: 0 | Status: SHIPPED | OUTPATIENT
Start: 2019-01-08 | End: 2019-02-20
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
## 2019-01-24 ENCOUNTER — OFFICE VISIT (OUTPATIENT)
Dept: PHYSICAL THERAPY | Facility: HOSPITAL | Age: 44
End: 2019-01-24
Attending: INTERNAL MEDICINE
Payer: MEDICARE

## 2019-01-24 DIAGNOSIS — S39.012A STRAIN OF LUMBAR REGION, INITIAL ENCOUNTER: ICD-10-CM

## 2019-01-24 DIAGNOSIS — S89.91XD INJURY OF RIGHT KNEE, SUBSEQUENT ENCOUNTER: ICD-10-CM

## 2019-01-24 DIAGNOSIS — S13.4XXA WHIPLASH INJURY TO NECK, INITIAL ENCOUNTER: ICD-10-CM

## 2019-01-24 PROCEDURE — 97163 PT EVAL HIGH COMPLEX 45 MIN: CPT
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
## 2019-01-28 ENCOUNTER — OFFICE VISIT (OUTPATIENT)
Dept: PHYSICAL THERAPY | Facility: HOSPITAL | Age: 44
End: 2019-01-28
Attending: INTERNAL MEDICINE
Payer: MEDICARE

## 2019-01-28 DIAGNOSIS — S89.91XD INJURY OF RIGHT KNEE, SUBSEQUENT ENCOUNTER: ICD-10-CM

## 2019-01-28 DIAGNOSIS — S13.4XXA WHIPLASH INJURY TO NECK, INITIAL ENCOUNTER: ICD-10-CM

## 2019-01-28 DIAGNOSIS — S39.012A STRAIN OF LUMBAR REGION, INITIAL ENCOUNTER: ICD-10-CM

## 2019-01-28 PROCEDURE — 97110 THERAPEUTIC EXERCISES: CPT
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
## 2019-01-31 ENCOUNTER — OFFICE VISIT (OUTPATIENT)
Dept: PHYSICAL THERAPY | Facility: HOSPITAL | Age: 44
End: 2019-01-31
Attending: INTERNAL MEDICINE
Payer: MEDICARE

## 2019-01-31 DIAGNOSIS — S39.012A STRAIN OF LUMBAR REGION, INITIAL ENCOUNTER: ICD-10-CM

## 2019-01-31 DIAGNOSIS — S89.91XD INJURY OF RIGHT KNEE, SUBSEQUENT ENCOUNTER: ICD-10-CM

## 2019-01-31 DIAGNOSIS — S13.4XXA WHIPLASH INJURY TO NECK, INITIAL ENCOUNTER: ICD-10-CM

## 2019-01-31 PROCEDURE — 97110 THERAPEUTIC EXERCISES: CPT
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
## 2019-02-05 ENCOUNTER — OFFICE VISIT (OUTPATIENT)
Dept: PHYSICAL THERAPY | Facility: HOSPITAL | Age: 44
End: 2019-02-05
Attending: INTERNAL MEDICINE
Payer: COMMERCIAL

## 2019-02-05 DIAGNOSIS — S39.012A STRAIN OF LUMBAR REGION, INITIAL ENCOUNTER: ICD-10-CM

## 2019-02-05 DIAGNOSIS — S89.91XD INJURY OF RIGHT KNEE, SUBSEQUENT ENCOUNTER: ICD-10-CM

## 2019-02-05 DIAGNOSIS — S13.4XXA WHIPLASH INJURY TO NECK, INITIAL ENCOUNTER: ICD-10-CM

## 2019-02-05 PROCEDURE — 97110 THERAPEUTIC EXERCISES: CPT
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
## 2019-02-07 ENCOUNTER — APPOINTMENT (OUTPATIENT)
Dept: PHYSICAL THERAPY | Facility: HOSPITAL | Age: 44
End: 2019-02-07
Attending: INTERNAL MEDICINE
Payer: MEDICARE

## 2019-02-12 ENCOUNTER — OFFICE VISIT (OUTPATIENT)
Dept: PHYSICAL THERAPY | Facility: HOSPITAL | Age: 44
End: 2019-02-12
Attending: INTERNAL MEDICINE
Payer: COMMERCIAL

## 2019-02-12 DIAGNOSIS — S89.91XD INJURY OF RIGHT KNEE, SUBSEQUENT ENCOUNTER: ICD-10-CM

## 2019-02-12 DIAGNOSIS — S39.012A STRAIN OF LUMBAR REGION, INITIAL ENCOUNTER: ICD-10-CM

## 2019-02-12 DIAGNOSIS — S13.4XXA WHIPLASH INJURY TO NECK, INITIAL ENCOUNTER: ICD-10-CM

## 2019-02-12 PROCEDURE — 97110 THERAPEUTIC EXERCISES: CPT
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
## 2019-02-19 ENCOUNTER — OFFICE VISIT (OUTPATIENT)
Dept: PHYSICAL THERAPY | Facility: HOSPITAL | Age: 44
End: 2019-02-19
Attending: INTERNAL MEDICINE
Payer: MEDICARE

## 2019-02-19 PROCEDURE — 97110 THERAPEUTIC EXERCISES: CPT
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
## 2019-02-22 RX ORDER — CYCLOBENZAPRINE HCL 10 MG
TABLET ORAL
Qty: 30 TABLET | Refills: 0 | Status: SHIPPED | OUTPATIENT
Start: 2019-02-22 | End: 2019-06-25 | Stop reason: ALTCHOICE

## 2019-02-26 ENCOUNTER — OFFICE VISIT (OUTPATIENT)
Dept: PHYSICAL THERAPY | Facility: HOSPITAL | Age: 44
End: 2019-02-26
Attending: INTERNAL MEDICINE
Payer: MEDICARE

## 2019-02-26 PROCEDURE — 97110 THERAPEUTIC EXERCISES: CPT
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
## 2019-02-28 ENCOUNTER — OFFICE VISIT (OUTPATIENT)
Dept: PHYSICAL THERAPY | Facility: HOSPITAL | Age: 44
End: 2019-02-28
Attending: INTERNAL MEDICINE
Payer: MEDICARE

## 2019-02-28 PROCEDURE — 97110 THERAPEUTIC EXERCISES: CPT
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
## 2019-03-03 DIAGNOSIS — F41.9 ANXIETY: ICD-10-CM

## 2019-03-04 ENCOUNTER — OFFICE VISIT (OUTPATIENT)
Dept: PHYSICAL THERAPY | Facility: HOSPITAL | Age: 44
End: 2019-03-04
Attending: INTERNAL MEDICINE
Payer: MEDICARE

## 2019-03-04 PROCEDURE — 97110 THERAPEUTIC EXERCISES: CPT

## 2019-03-04 PROCEDURE — 97140 MANUAL THERAPY 1/> REGIONS: CPT
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
## 2019-03-14 ENCOUNTER — OFFICE VISIT (OUTPATIENT)
Dept: PHYSICAL THERAPY | Facility: HOSPITAL | Age: 44
End: 2019-03-14
Attending: INTERNAL MEDICINE
Payer: MEDICARE

## 2019-03-14 PROCEDURE — 97110 THERAPEUTIC EXERCISES: CPT
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
## 2019-03-18 ENCOUNTER — OFFICE VISIT (OUTPATIENT)
Dept: PHYSICAL THERAPY | Facility: HOSPITAL | Age: 44
End: 2019-03-18
Attending: INTERNAL MEDICINE
Payer: MEDICARE

## 2019-03-18 PROCEDURE — 97110 THERAPEUTIC EXERCISES: CPT

## 2019-03-18 PROCEDURE — 97140 MANUAL THERAPY 1/> REGIONS: CPT
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
## 2019-03-21 ENCOUNTER — OFFICE VISIT (OUTPATIENT)
Dept: PHYSICAL THERAPY | Facility: HOSPITAL | Age: 44
End: 2019-03-21
Attending: INTERNAL MEDICINE
Payer: MEDICARE

## 2019-03-21 PROCEDURE — 97110 THERAPEUTIC EXERCISES: CPT
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
## 2019-06-20 ENCOUNTER — OFFICE VISIT (OUTPATIENT)
Dept: INTERNAL MEDICINE CLINIC | Facility: CLINIC | Age: 44
End: 2019-06-20
Payer: MEDICARE

## 2019-06-20 VITALS
RESPIRATION RATE: 19 BRPM | SYSTOLIC BLOOD PRESSURE: 118 MMHG | BODY MASS INDEX: 39.85 KG/M2 | OXYGEN SATURATION: 100 % | DIASTOLIC BLOOD PRESSURE: 60 MMHG | HEART RATE: 69 BPM | WEIGHT: 245 LBS | HEIGHT: 65.6 IN

## 2019-06-20 DIAGNOSIS — S39.012A STRAIN OF LUMBAR REGION, INITIAL ENCOUNTER: ICD-10-CM

## 2019-06-20 DIAGNOSIS — F41.9 ANXIETY: ICD-10-CM

## 2019-06-20 DIAGNOSIS — S13.4XXA WHIPLASH INJURY TO NECK, INITIAL ENCOUNTER: ICD-10-CM

## 2019-06-20 DIAGNOSIS — S89.91XD INJURY OF RIGHT KNEE, SUBSEQUENT ENCOUNTER: Primary | ICD-10-CM

## 2019-06-20 DIAGNOSIS — E66.01 MORBID OBESITY WITH BMI OF 40.0-44.9, ADULT (HCC): ICD-10-CM

## 2019-06-20 DIAGNOSIS — R73.01 FASTING HYPERGLYCEMIA: ICD-10-CM

## 2019-06-20 PROCEDURE — 99214 OFFICE O/P EST MOD 30 MIN: CPT | Performed by: INTERNAL MEDICINE

## 2019-06-20 RX ORDER — METRONIDAZOLE 7.5 MG/G
1 GEL VAGINAL
COMMUNITY
Start: 2019-02-08 | End: 2019-06-20 | Stop reason: ALTCHOICE

## 2019-06-20 RX ORDER — ALPRAZOLAM 0.5 MG/1
TABLET ORAL
Refills: 0 | COMMUNITY
Start: 2019-04-25 | End: 2022-01-25

## 2019-06-20 RX ORDER — CHLORHEXIDINE GLUCONATE 0.12 MG/ML
RINSE ORAL
COMMUNITY
Start: 2019-06-19 | End: 2019-06-25 | Stop reason: ALTCHOICE

## 2019-06-20 RX ORDER — VALACYCLOVIR HYDROCHLORIDE 500 MG/1
TABLET, FILM COATED ORAL
Refills: 3 | COMMUNITY
Start: 2019-02-08 | End: 2020-07-22
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
## 2019-06-25 ENCOUNTER — OFFICE VISIT (OUTPATIENT)
Dept: INTERNAL MEDICINE CLINIC | Facility: CLINIC | Age: 44
End: 2019-06-25
Payer: MEDICARE

## 2019-06-25 VITALS
OXYGEN SATURATION: 98 % | WEIGHT: 245 LBS | RESPIRATION RATE: 19 BRPM | DIASTOLIC BLOOD PRESSURE: 70 MMHG | BODY MASS INDEX: 39.85 KG/M2 | SYSTOLIC BLOOD PRESSURE: 118 MMHG | HEART RATE: 68 BPM | HEIGHT: 65.6 IN

## 2019-06-25 DIAGNOSIS — Z00.00 ENCOUNTER FOR ANNUAL HEALTH EXAMINATION: Primary | ICD-10-CM

## 2019-06-25 DIAGNOSIS — F32.4 MAJOR DEPRESSIVE DISORDER WITH SINGLE EPISODE, IN PARTIAL REMISSION (HCC): ICD-10-CM

## 2019-06-25 DIAGNOSIS — F41.9 ANXIETY: ICD-10-CM

## 2019-06-25 DIAGNOSIS — E61.1 IRON DEFICIENCY: ICD-10-CM

## 2019-06-25 DIAGNOSIS — E66.01 MORBID OBESITY WITH BMI OF 40.0-44.9, ADULT (HCC): ICD-10-CM

## 2019-06-25 DIAGNOSIS — Z01.419 ENCOUNTER FOR GYNECOLOGICAL EXAMINATION: ICD-10-CM

## 2019-06-25 DIAGNOSIS — R06.83 SNORING: ICD-10-CM

## 2019-06-25 DIAGNOSIS — E55.9 VITAMIN D DEFICIENCY: ICD-10-CM

## 2019-06-25 DIAGNOSIS — R53.82 CHRONIC FATIGUE: ICD-10-CM

## 2019-06-25 DIAGNOSIS — Z12.31 VISIT FOR SCREENING MAMMOGRAM: ICD-10-CM

## 2019-06-25 DIAGNOSIS — E78.00 HYPERCHOLESTEREMIA: ICD-10-CM

## 2019-06-25 PROCEDURE — 80053 COMPREHEN METABOLIC PANEL: CPT | Performed by: INTERNAL MEDICINE

## 2019-06-25 PROCEDURE — 87106 FUNGI IDENTIFICATION YEAST: CPT | Performed by: INTERNAL MEDICINE

## 2019-06-25 PROCEDURE — 82306 VITAMIN D 25 HYDROXY: CPT | Performed by: INTERNAL MEDICINE

## 2019-06-25 PROCEDURE — 87808 TRICHOMONAS ASSAY W/OPTIC: CPT | Performed by: INTERNAL MEDICINE

## 2019-06-25 PROCEDURE — 36415 COLL VENOUS BLD VENIPUNCTURE: CPT | Performed by: INTERNAL MEDICINE

## 2019-06-25 PROCEDURE — 84466 ASSAY OF TRANSFERRIN: CPT | Performed by: INTERNAL MEDICINE

## 2019-06-25 PROCEDURE — 87624 HPV HI-RISK TYP POOLED RSLT: CPT | Performed by: INTERNAL MEDICINE

## 2019-06-25 PROCEDURE — 80061 LIPID PANEL: CPT | Performed by: INTERNAL MEDICINE

## 2019-06-25 PROCEDURE — G0439 PPPS, SUBSEQ VISIT: HCPCS | Performed by: INTERNAL MEDICINE

## 2019-06-25 PROCEDURE — 88175 CYTOPATH C/V AUTO FLUID REDO: CPT | Performed by: INTERNAL MEDICINE

## 2019-06-25 PROCEDURE — G0444 DEPRESSION SCREEN ANNUAL: HCPCS | Performed by: INTERNAL MEDICINE

## 2019-06-25 PROCEDURE — 99497 ADVNCD CARE PLAN 30 MIN: CPT | Performed by: INTERNAL MEDICINE

## 2019-06-25 PROCEDURE — 85025 COMPLETE CBC W/AUTO DIFF WBC: CPT | Performed by: INTERNAL MEDICINE

## 2019-06-25 PROCEDURE — 83540 ASSAY OF IRON: CPT | Performed by: INTERNAL MEDICINE

## 2019-06-25 PROCEDURE — 84443 ASSAY THYROID STIM HORMONE: CPT | Performed by: INTERNAL MEDICINE

## 2019-06-25 PROCEDURE — 87205 SMEAR GRAM STAIN: CPT | Performed by: INTERNAL MEDICINE

## 2019-06-25 NOTE — PROGRESS NOTES
HPI:   Obdulia Donovan is a 40year old female who presents for a Medicare Subsequent Annual Wellness visit (Pt already had Initial Annual Wellness). Patient is on disability because of depression. She is slowly getting better. Had few episodes anxiety Face to Face with patient and Family/surrogate (if present), and forms available to patient in AVS      Patient still not ready to have advance directive and power of atty. Spent 16 mins devoted  in explaining advance directives, power of atty.       She 9.7 (L) 06/25/2019    .0 06/25/2019        ALLERGIES:   She has No Known Allergies.     CURRENT MEDICATIONS:     Outpatient Medications Marked as Taking for the 6/25/19 encounter (Office Visit) with Frances Tolbert MD:  valACYclovir HCl 500 MG Oral Ta anxiety  HEMATOLOGIC:  hx of anemia  ENDOCRINE: denies thyroid history  ALL/ASTHMA: denies hx of allergy or asthma    EXAM:   /70 (BP Location: Right arm, Patient Position: Sitting, Cuff Size: adult)   Pulse 68   Resp 19   Ht 65.6\"   Wt 245 lb   SpO turgor normal, no rashes or lesions   Lymph nodes: Cervical, supraclavicular, and axillary nodes normal   Neurologic: Normal    and Breasts:  normal appearance, no masses or tenderness, Inspection negative, No nipple retraction or dimpling, No nipple disch T4; Future    Anxiety  Alprazolam prn  Improved since having good relationship with . Vitamin D deficiency  -     VITAMIN D, 25-HYDROXY; Future    Chronic fatigue  Resolving since started exercise and intentional weight loss.   Check TSH     Encou 10 years Start at age 39  Update Health Maintenance if applicable    Flex Sigmoidoscopy Screen every 10 years No results found for this or any previous visit. No flowsheet data found.      Fecal Occult Blood Annually No results found for: FOB No flowsheet d pharmacy  prescription benefits                    Template: [employer] MEDICARE ANNUAL ASSESSMENT FEMALE [06315]

## 2019-06-25 NOTE — PATIENT INSTRUCTIONS
[facility]'s SCREENING SCHEDULE   Tests on this list are recommended by your physician but may not be covered, or covered at this frequency, by your insurer. Please check with your insurance carrier before scheduling to verify coverage.    PREVENTATI Health Maintenance if applicable    Flex Sigmoidoscopy Screen  Covered every 5 years No results found for this or any previous visit. No flowsheet data found.      Fecal Occult Blood   Covered Annually No results found for: FOB, OCCULTSTOOL No flowsheet jose angel B for Moderate/High Risk       No orders found for this or any previous visit.  Medium/high risk factors:   End-stage renal disease   Hemophiliacs who received Factor VIII or IX concentrates   Clients of institutions for the mentally retarded   Persons who flowsheet data found.     Fasting Blood Sugar (FSB)   Patient must be diagnosed with one of the following:   • Hypertension   • Dyslipidemia   • Obesity (BMI ³30 kg/m2)   • Previous elevated impaired FBS or GTT   … or any two of the following:   • Overweigh Screening      Ophthalmology Visit   Covered annually for Diabetics, people with Glaucoma family history,   Americans over age 48   Americans over age 72 No flowsheet data found.  OK to schedule if you are in this risk group, make sure you ha abusers     Tetanus Toxoid- Only covered with a cut with metal- TD and TDaP Not covered by Medicare Part B) No orders found for this or any previous visit.  This may be covered with your prescription benefits, but Medicare does not cover unless Medically ne

## 2019-06-25 NOTE — PROGRESS NOTES
Pt presented to clinic today for blood draw. Per physician able to draw orders. Orders  documented within chart. Pt tolerated lab draw well.  verified.   Orders drawn include: iron, vit d, tsh, lipid, cmp, cbc  Site of draw: rt crystal Mahajan, SHIREEN

## 2019-06-26 PROBLEM — F32.4 MAJOR DEPRESSIVE DISORDER WITH SINGLE EPISODE, IN PARTIAL REMISSION: Status: ACTIVE | Noted: 2019-06-26

## 2019-06-26 PROBLEM — F32.4 MAJOR DEPRESSIVE DISORDER WITH SINGLE EPISODE, IN PARTIAL REMISSION (HCC): Status: ACTIVE | Noted: 2019-06-26

## 2019-06-30 DIAGNOSIS — D50.9 MICROCYTIC ANEMIA: Primary | ICD-10-CM

## 2019-06-30 RX ORDER — FERROUS SULFATE 325(65) MG
325 TABLET ORAL
Qty: 90 TABLET | Refills: 1 | Status: SHIPPED | OUTPATIENT
Start: 2019-06-30 | End: 2020-07-22

## 2019-06-30 RX ORDER — METRONIDAZOLE 7.5 MG/G
1 GEL VAGINAL NIGHTLY
Qty: 1 TUBE | Refills: 0 | Status: SHIPPED | OUTPATIENT
Start: 2019-06-30 | End: 2019-07-05

## 2019-07-01 LAB — HPV I/H RISK 1 DNA SPEC QL NAA+PROBE: NEGATIVE

## 2019-07-29 ENCOUNTER — APPOINTMENT (OUTPATIENT)
Dept: LAB | Facility: HOSPITAL | Age: 44
End: 2019-07-29
Attending: INTERNAL MEDICINE
Payer: MEDICARE

## 2019-07-29 DIAGNOSIS — D50.9 MICROCYTIC ANEMIA: ICD-10-CM

## 2019-07-29 LAB — HEMOCCULT STL QL: POSITIVE

## 2019-07-29 PROCEDURE — 82274 ASSAY TEST FOR BLOOD FECAL: CPT

## 2019-07-31 DIAGNOSIS — R19.5 HEME + STOOL: Primary | ICD-10-CM

## 2019-08-15 ENCOUNTER — TELEPHONE (OUTPATIENT)
Dept: GASTROENTEROLOGY | Facility: CLINIC | Age: 44
End: 2019-08-15

## 2019-08-15 ENCOUNTER — OFFICE VISIT (OUTPATIENT)
Dept: GASTROENTEROLOGY | Facility: CLINIC | Age: 44
End: 2019-08-15
Payer: MEDICARE

## 2019-08-15 VITALS
SYSTOLIC BLOOD PRESSURE: 126 MMHG | WEIGHT: 245 LBS | HEART RATE: 77 BPM | DIASTOLIC BLOOD PRESSURE: 81 MMHG | BODY MASS INDEX: 38.45 KG/M2 | HEIGHT: 67 IN

## 2019-08-15 DIAGNOSIS — K21.9 GASTROESOPHAGEAL REFLUX DISEASE WITHOUT ESOPHAGITIS: ICD-10-CM

## 2019-08-15 DIAGNOSIS — K59.04 CHRONIC IDIOPATHIC CONSTIPATION: ICD-10-CM

## 2019-08-15 DIAGNOSIS — E61.1 IRON DEFICIENCY: Primary | ICD-10-CM

## 2019-08-15 DIAGNOSIS — D50.9 IRON DEFICIENCY ANEMIA, UNSPECIFIED IRON DEFICIENCY ANEMIA TYPE: Primary | ICD-10-CM

## 2019-08-15 PROCEDURE — 99204 OFFICE O/P NEW MOD 45 MIN: CPT | Performed by: INTERNAL MEDICINE

## 2019-08-15 RX ORDER — OMEPRAZOLE 20 MG/1
20 CAPSULE, DELAYED RELEASE ORAL EVERY MORNING
Qty: 30 CAPSULE | Refills: 2 | Status: SHIPPED | OUTPATIENT
Start: 2019-08-15 | End: 2019-09-14

## 2019-08-15 NOTE — H&P
[facility], [facility] - Gastroenterology                                                                                                               Reason for consult: i standard drinks      Comment: wine    Drug use: No       Medications (Active prior to today's visit):    Current Outpatient Medications:  omeprazole 20 MG Oral Capsule Delayed Release Take 1 capsule (20 mg total) by mouth every morning.  Disp: 30 capsule Rf labs and imaging were reviewed and discussed with patient today. .  ASSESSMENT/PLAN:   Cari Torres is a 40year old year-old female with history of hemorrhoids, hx of menorrhagia, hx of anemia, obesity, who presents for evaluation of anemia.     #I proceed with the procedures with possible intervention [i.e. polypectomy, stent placement, etc.] as indicated. Orders This Visit:  No orders of the defined types were placed in this encounter.       Meds This Visit:  Requested Prescriptions

## 2019-08-15 NOTE — TELEPHONE ENCOUNTER
Dr Jackelyn Betancourt,    I noticed that Carrminerva Dux was not on pt's list of medications when I was scheduling, please send bowel prep to pt's pharmacy. Thank you.

## 2019-08-15 NOTE — TELEPHONE ENCOUNTER
Scheduled for:  Colonoscopy [facility]  Provider Name: Dr Abhijit Murry  Date:Tues 9/24/19  Location:  [facility]  Sedation:  MAC  Time: Moved to 10:15 am                                  From: 9:00 am  Prep: split dose trylite  Meds/Allergies Reconciled?:  HI Martel

## 2019-08-16 RX ORDER — POLYETHYLENE GLYCOL 3350, SODIUM CHLORIDE, SODIUM BICARBONATE, POTASSIUM CHLORIDE 420; 11.2; 5.72; 1.48 G/4L; G/4L; G/4L; G/4L
POWDER, FOR SOLUTION ORAL
Qty: 1 BOTTLE | Refills: 0 | Status: ON HOLD | OUTPATIENT
Start: 2019-08-16 | End: 2019-09-24

## 2019-08-16 NOTE — TELEPHONE ENCOUNTER
I called and spoke to pt and explained that I had to move scheduled CLN/EGD procedures on 9/24/19 down by and hour. Pt agreed and verbalized understanding. I informed that it was same location, and that I would send new instructions via [employer] on Monday.

## 2019-09-21 RX ORDER — OMEPRAZOLE 20 MG/1
20 CAPSULE, DELAYED RELEASE ORAL
COMMUNITY
End: 2020-07-22

## 2019-09-24 ENCOUNTER — HOSPITAL ENCOUNTER (OUTPATIENT)
Facility: HOSPITAL | Age: 44
Setting detail: HOSPITAL OUTPATIENT SURGERY
Discharge: HOME | End: 2019-09-24
Attending: INTERNAL MEDICINE | Admitting: INTERNAL MEDICINE
Payer: MEDICARE

## 2019-09-24 ENCOUNTER — ANESTHESIA EVENT (OUTPATIENT)
Dept: ENDOSCOPY | Facility: HOSPITAL | Age: 44
End: 2019-09-24
Payer: MEDICARE

## 2019-09-24 ENCOUNTER — ANESTHESIA (OUTPATIENT)
Dept: ENDOSCOPY | Facility: HOSPITAL | Age: 44
End: 2019-09-24
Payer: MEDICARE

## 2019-09-24 VITALS
RESPIRATION RATE: 16 BRPM | HEIGHT: 66.5 IN | SYSTOLIC BLOOD PRESSURE: 104 MMHG | BODY MASS INDEX: 38.75 KG/M2 | DIASTOLIC BLOOD PRESSURE: 76 MMHG | OXYGEN SATURATION: 100 % | WEIGHT: 244 LBS | HEART RATE: 78 BPM

## 2019-09-24 DIAGNOSIS — D50.9 IRON DEFICIENCY ANEMIA, UNSPECIFIED IRON DEFICIENCY ANEMIA TYPE: ICD-10-CM

## 2019-09-24 DIAGNOSIS — K64.9 HEMORRHOIDS, UNSPECIFIED HEMORRHOID TYPE: ICD-10-CM

## 2019-09-24 DIAGNOSIS — K57.90 DIVERTICULOSIS: Primary | ICD-10-CM

## 2019-09-24 DIAGNOSIS — K63.5 SIGMOID POLYP: ICD-10-CM

## 2019-09-24 LAB — B-HCG UR QL: NEGATIVE

## 2019-09-24 PROCEDURE — 45385 COLONOSCOPY W/LESION REMOVAL: CPT | Performed by: INTERNAL MEDICINE

## 2019-09-24 PROCEDURE — 43239 EGD BIOPSY SINGLE/MULTIPLE: CPT | Performed by: INTERNAL MEDICINE

## 2019-09-24 PROCEDURE — 0DB68ZX EXCISION OF STOMACH, VIA NATURAL OR ARTIFICIAL OPENING ENDOSCOPIC, DIAGNOSTIC: ICD-10-PCS | Performed by: INTERNAL MEDICINE

## 2019-09-24 PROCEDURE — 0DB98ZX EXCISION OF DUODENUM, VIA NATURAL OR ARTIFICIAL OPENING ENDOSCOPIC, DIAGNOSTIC: ICD-10-PCS | Performed by: INTERNAL MEDICINE

## 2019-09-24 PROCEDURE — 0DBN8ZX EXCISION OF SIGMOID COLON, VIA NATURAL OR ARTIFICIAL OPENING ENDOSCOPIC, DIAGNOSTIC: ICD-10-PCS | Performed by: INTERNAL MEDICINE

## 2019-09-24 RX ORDER — SODIUM CHLORIDE, SODIUM LACTATE, POTASSIUM CHLORIDE, CALCIUM CHLORIDE 600; 310; 30; 20 MG/100ML; MG/100ML; MG/100ML; MG/100ML
INJECTION, SOLUTION INTRAVENOUS CONTINUOUS
Status: DISCONTINUED | OUTPATIENT
Start: 2019-09-24 | End: 2019-09-24

## 2019-09-24 RX ORDER — LIDOCAINE HYDROCHLORIDE 20 MG/ML
INJECTION, SOLUTION EPIDURAL; INFILTRATION; INTRACAUDAL; PERINEURAL AS NEEDED
Status: DISCONTINUED | OUTPATIENT
Start: 2019-09-24 | End: 2019-09-24 | Stop reason: SURG

## 2019-09-24 RX ORDER — NALOXONE HYDROCHLORIDE 0.4 MG/ML
80 INJECTION, SOLUTION INTRAMUSCULAR; INTRAVENOUS; SUBCUTANEOUS AS NEEDED
Status: DISCONTINUED | OUTPATIENT
Start: 2019-09-24 | End: 2019-09-24

## 2019-09-24 RX ADMIN — SODIUM CHLORIDE, SODIUM LACTATE, POTASSIUM CHLORIDE, CALCIUM CHLORIDE: 600; 310; 30; 20 INJECTION, SOLUTION INTRAVENOUS at 10:45:00

## 2019-09-24 RX ADMIN — LIDOCAINE HYDROCHLORIDE 20 MG: 20 INJECTION, SOLUTION EPIDURAL; INFILTRATION; INTRACAUDAL; PERINEURAL at 10:17:00

## 2019-09-24 NOTE — ANESTHESIA POSTPROCEDURE EVALUATION
Patient: Brenda Hurd    Procedure Summary     Date:  09/24/19 Room / Location:  [facility] ENDOSCOPY 01 / [facility] ENDOSCOPY    Anesthesia Start:  0283 Anesthesia Stop:      Procedures:       COLONOSCOPY (N/A )      ESOPHAGOGASTRODUODENOSCOPY (EGD) (N/A ) Kaveh Beauchamp

## 2019-09-24 NOTE — H&P
History & Physical Examination    Patient Name: Francisco J Watson  MRN: [id number]  [facility]: [id number]  YOB: 1975    Diagnosis: GERD, iron def anemia, and positive fecal occult blood       Medications Prior to Admission:  omeprazole 20 MG Oral Cap procedure with the patient/family. They understand and agree to proceed with plan of care. I have reviewed the History and Physical done within the last 30 days. Any changes noted above.     [address], [address] [place] [address] - Gastroenterology  9/24/

## 2019-09-24 NOTE — OPERATIVE REPORT
ESOPHAGOGASTRODUODENOSCOPY (EGD) & COLONOSCOPY REPORT    Sixto Pemberton Peoples     1975 Age 40year old   PCP Rafal Hdz MD Endoscopist Ibeth Mims MD     Date of procedure: 19    Procedure: EGD w/cold biopsy & Colonoscopy w/cold snare good with washing. I then carefully withdrew the instrument from the patient who tolerated the procedure well. Complications: None    EGD findings:      1. Esophagus: The squamocolumnar junction was noted at 37 cm and appeared regular.  The diaphragmati blood, call office or go to the ER. · Follow-up with gynecology. · Avoid caffeine, chocolate, peppermint, and alcohol.  Also avoid lying down flat or in a recumbent position for 3 hours after a meal.   · Continue omeprazole as needed.     >>>If tissue was

## 2019-09-24 NOTE — ANESTHESIA PREPROCEDURE EVALUATION
Anesthesia PreOp Note    HPI:     Zena Howard is a 40year old female who presents for preoperative consultation requested by: Ruslan Harper MD    Date of Surgery: 9/24/2019    Procedure(s):  COLONOSCOPY  ESOPHAGOGASTRODUODENOSCOPY (EGD)  Indication Laterality Date   • D & C      x 2   • SALPINGECTOMY Bilateral 03/2017   • TUBAL LIGATION           Medications Prior to Admission:  omeprazole 20 MG Oral Capsule Delayed Release Take 20 mg by mouth every morning before breakfast. Disp:  Rfl:  9/23/2019 at Days per week: Not on file        Minutes per session: Not on file      Stress: Not on file    Relationships      Social connections:        Talks on phone: Not on file        Gets together: Not on file        Attends Muslim service: Not on file legal guardian or family member of the nature of the anesthetic plan, benefits, risks including possible dental damage if relevant, major complications, and any alternative forms of anesthetic management.    All of the patient's questions were answered to t

## 2019-10-01 ENCOUNTER — TELEPHONE (OUTPATIENT)
Dept: GASTROENTEROLOGY | Facility: CLINIC | Age: 44
End: 2019-10-01

## 2019-10-01 NOTE — TELEPHONE ENCOUNTER
Entered into Epic:Recall colon in 5 years per Dr. Phillip Noe. Last Colon done 9/24/19, next due 9/24/24. HM updated.

## 2019-10-01 NOTE — TELEPHONE ENCOUNTER
D/w patient re; EGD/CLN path. Neg Hpylori/neg celiac disease. Suspect anemia is 2/2 menorrhagia which she is addressing. She also had a sigmoid ganglioneuroma polyp removed. We discussed the risk as below.      \"Gastrointestinal ganglioneuromas are usua

## 2019-10-05 ENCOUNTER — OFFICE VISIT (OUTPATIENT)
Dept: INTERNAL MEDICINE CLINIC | Facility: CLINIC | Age: 44
End: 2019-10-05
Payer: MEDICARE

## 2019-10-05 VITALS
BODY MASS INDEX: 38.11 KG/M2 | DIASTOLIC BLOOD PRESSURE: 78 MMHG | OXYGEN SATURATION: 100 % | HEIGHT: 66.5 IN | WEIGHT: 240 LBS | RESPIRATION RATE: 19 BRPM | HEART RATE: 55 BPM | SYSTOLIC BLOOD PRESSURE: 134 MMHG

## 2019-10-05 DIAGNOSIS — M17.11 PRIMARY OSTEOARTHRITIS OF RIGHT KNEE: Primary | ICD-10-CM

## 2019-10-05 DIAGNOSIS — E66.01 MORBID OBESITY WITH BMI OF 40.0-44.9, ADULT (HCC): ICD-10-CM

## 2019-10-05 DIAGNOSIS — Z23 FLU VACCINE NEED: ICD-10-CM

## 2019-10-05 DIAGNOSIS — E55.9 VITAMIN D DEFICIENCY: ICD-10-CM

## 2019-10-05 DIAGNOSIS — E61.1 IRON DEFICIENCY: ICD-10-CM

## 2019-10-05 PROCEDURE — 99214 OFFICE O/P EST MOD 30 MIN: CPT | Performed by: INTERNAL MEDICINE

## 2019-10-05 PROCEDURE — 90686 IIV4 VACC NO PRSV 0.5 ML IM: CPT | Performed by: INTERNAL MEDICINE

## 2019-10-05 PROCEDURE — G0008 ADMIN INFLUENZA VIRUS VAC: HCPCS | Performed by: INTERNAL MEDICINE

## 2019-10-05 RX ORDER — CHLORHEXIDINE GLUCONATE 0.12 MG/ML
RINSE ORAL
Refills: 1 | COMMUNITY
Start: 2019-08-08 | End: 2020-07-22

## 2019-10-05 NOTE — PROGRESS NOTES
Flu shot administered in right arm IM  Patient denies allergy to eggs, flu shot before, being sick today, diagnosed with Guillain barre syndrome. Patient tolerated well no reaction at this time.   Date of Birth Verified   Ordering Dr. Zeny Day

## 2019-10-05 NOTE — PROGRESS NOTES
HPI:    Patient ID: Kevin Mitchell is a 40year old female. HPI  Patient is here for f/u. Morbidly obese with intentional weight loss of 4 lbs. Cutting down carbs intake. She runs  .  Experienced recurrence of right knee pain Pain is worsen with w DAYS. Disp:  Rfl: 3   ALPRAZolam 0.5 MG Oral Tab TK 1 T PO Q NIGHT PRA Disp:  Rfl: 0     Allergies:No Known Allergies   PHYSICAL EXAM:   Physical Exam   Nursing note and vitals reviewed.    Constitutional: She is oriented to person, place, and time and obes

## 2019-10-11 ENCOUNTER — ORDER TRANSCRIPTION (OUTPATIENT)
Dept: SLEEP CENTER | Age: 44
End: 2019-10-11

## 2019-10-11 DIAGNOSIS — G47.33 OSA (OBSTRUCTIVE SLEEP APNEA): Primary | ICD-10-CM

## 2019-10-18 ENCOUNTER — OFFICE VISIT (OUTPATIENT)
Dept: SLEEP CENTER | Age: 44
End: 2019-10-18
Attending: INTERNAL MEDICINE
Payer: MEDICARE

## 2019-10-18 DIAGNOSIS — Z76.89 SLEEP CONCERN: Primary | ICD-10-CM

## 2019-10-18 DIAGNOSIS — G47.33 OSA (OBSTRUCTIVE SLEEP APNEA): ICD-10-CM

## 2019-10-18 DIAGNOSIS — G47.33 OSA (OBSTRUCTIVE SLEEP APNEA): Primary | ICD-10-CM

## 2019-10-18 PROCEDURE — 95810 POLYSOM 6/> YRS 4/> PARAM: CPT

## 2019-10-20 NOTE — PROCEDURES
[facility]  Accredited by the [place] of Sleep Medicine (AASM)    PATIENT'S NAME: Chidi Ashton   ATTENDING PHYSICIAN: Oleksandr Miles MD   REFERRING PHYSICIAN: Oleksandr Miles MD   PATIENT ACCOUNT #: [de-identified] LOCATION: Sleep respiratory depressants at bedtime. 3.   Identify and minimize factors increasing upper airway resistance such as allergic rhinitis or obstructive lesions of upper airways.     4.   If snoring is a social problem, then dental device should be considered, o

## 2019-11-07 ENCOUNTER — TELEPHONE (OUTPATIENT)
Dept: INTERNAL MEDICINE CLINIC | Facility: CLINIC | Age: 44
End: 2019-11-07

## 2019-11-07 NOTE — TELEPHONE ENCOUNTER
----- Message from Jo Sanchez MD sent at 10/21/2019 12:54 AM CDT -----  Please call. NO DEMETRIUS. Serafin Hutchinson She has simple snoring . PLAN:    1. Weight loss. 2.       Avoidance of respiratory depressants at bedtime.   3.       Identify and minimize factor

## 2019-12-15 RX ORDER — ERGOCALCIFEROL 1.25 MG/1
CAPSULE ORAL
Qty: 6 CAPSULE | Refills: 0 | Status: SHIPPED | OUTPATIENT
Start: 2019-12-15 | End: 2020-03-06

## 2019-12-15 RX ORDER — ERGOCALCIFEROL 1.25 MG/1
CAPSULE ORAL
Qty: 6 CAPSULE | Refills: 0 | OUTPATIENT
Start: 2019-12-15

## 2020-03-06 RX ORDER — ERGOCALCIFEROL 1.25 MG/1
CAPSULE ORAL
Qty: 6 CAPSULE | Refills: 0 | Status: SHIPPED | OUTPATIENT
Start: 2020-03-06 | End: 2020-05-19

## 2020-05-19 RX ORDER — ERGOCALCIFEROL 1.25 MG/1
CAPSULE ORAL
Qty: 6 CAPSULE | Refills: 0 | Status: SHIPPED | OUTPATIENT
Start: 2020-05-19 | End: 2020-07-22

## 2020-07-22 ENCOUNTER — OFFICE VISIT (OUTPATIENT)
Dept: OBGYN CLINIC | Facility: CLINIC | Age: 45
End: 2020-07-22
Payer: MEDICARE

## 2020-07-22 VITALS
HEIGHT: 66.5 IN | BODY MASS INDEX: 36.05 KG/M2 | WEIGHT: 227 LBS | SYSTOLIC BLOOD PRESSURE: 122 MMHG | DIASTOLIC BLOOD PRESSURE: 72 MMHG

## 2020-07-22 DIAGNOSIS — N93.9 ABNORMAL UTERINE BLEEDING: Primary | ICD-10-CM

## 2020-07-22 PROCEDURE — 99203 OFFICE O/P NEW LOW 30 MIN: CPT | Performed by: OBSTETRICS & GYNECOLOGY

## 2020-07-22 RX ORDER — MULTIVIT-MIN/IRON/FOLIC ACID/K 18-600-40
CAPSULE ORAL
COMMUNITY
Start: 2020-02-03 | End: 2021-05-13 | Stop reason: ALTCHOICE

## 2020-07-22 RX ORDER — MAGNESIUM 200 MG
1000 TABLET ORAL DAILY
COMMUNITY
Start: 2020-06-12 | End: 2021-05-13 | Stop reason: ALTCHOICE

## 2020-07-22 RX ORDER — AMOXICILLIN 250 MG
CAPSULE ORAL
COMMUNITY
Start: 2020-06-11 | End: 2021-05-13 | Stop reason: ALTCHOICE

## 2020-07-22 NOTE — PROGRESS NOTES
Amaris Bonilla is here for a checkup. She is 80-year-old -American female  7 para 1-0-6-1. Patient has been followed at [facility] for heavy periods. Patient has had ultrasound as well as had endometrial biopsy in .     She has had bi Not performed. Small hemorrhoids. ASSESSMENT/PLAN:   Assessment     No diagnosis found. ASSESSMENT:      Uterine fibroids, menorrhagia    PLAN:     Discussed options with her including Mirena IUD.   Brochure on Mirena IUD was given to her patient

## 2020-08-17 RX ORDER — ERGOCALCIFEROL 1.25 MG/1
CAPSULE ORAL
Qty: 6 CAPSULE | Refills: 0 | OUTPATIENT
Start: 2020-08-17

## 2021-02-26 ENCOUNTER — TELEPHONE (OUTPATIENT)
Dept: INTERNAL MEDICINE CLINIC | Facility: CLINIC | Age: 46
End: 2021-02-26

## 2021-02-26 NOTE — TELEPHONE ENCOUNTER
Patient will call back to schedule an appointment. She has to check when her next day is off.  Patient is due for an annual physical.

## 2021-04-22 ENCOUNTER — OFFICE VISIT (OUTPATIENT)
Dept: PODIATRY CLINIC | Facility: CLINIC | Age: 46
End: 2021-04-22
Payer: MEDICARE

## 2021-04-22 DIAGNOSIS — D36.10 NEUROMA: Primary | ICD-10-CM

## 2021-04-22 PROCEDURE — 99203 OFFICE O/P NEW LOW 30 MIN: CPT | Performed by: PODIATRIST

## 2021-04-22 NOTE — PROGRESS NOTES
HPI:    Patient ID: Jackie Washington is a 39year old female. This 70-year-old female presents to the office today having not been seen since 2017. Her chief complaint is pain on the ball of the right foot.   She would state it is been present for at least (primary encounter diagnosis)    No orders of the defined types were placed in this encounter.       Meds This Visit:  Requested Prescriptions      No prescriptions requested or ordered in this encounter       Imaging & Referrals:  None       #[id number]

## 2021-04-26 ENCOUNTER — OFFICE VISIT (OUTPATIENT)
Dept: OBGYN CLINIC | Facility: CLINIC | Age: 46
End: 2021-04-26
Payer: MEDICARE

## 2021-04-26 VITALS
SYSTOLIC BLOOD PRESSURE: 118 MMHG | HEIGHT: 66.5 IN | DIASTOLIC BLOOD PRESSURE: 72 MMHG | BODY MASS INDEX: 35.89 KG/M2 | WEIGHT: 226 LBS

## 2021-04-26 DIAGNOSIS — D25.1 FIBROIDS, INTRAMURAL: ICD-10-CM

## 2021-04-26 DIAGNOSIS — N76.0 VAGINITIS AND VULVOVAGINITIS: ICD-10-CM

## 2021-04-26 DIAGNOSIS — Z01.419 WOMEN'S ANNUAL ROUTINE GYNECOLOGICAL EXAMINATION: Primary | ICD-10-CM

## 2021-04-26 PROBLEM — U07.1 COVID-19: Status: ACTIVE | Noted: 2021-02-26

## 2021-04-26 PROCEDURE — 87205 SMEAR GRAM STAIN: CPT | Performed by: OBSTETRICS & GYNECOLOGY

## 2021-04-26 PROCEDURE — 87591 N.GONORRHOEAE DNA AMP PROB: CPT | Performed by: OBSTETRICS & GYNECOLOGY

## 2021-04-26 PROCEDURE — 87106 FUNGI IDENTIFICATION YEAST: CPT | Performed by: OBSTETRICS & GYNECOLOGY

## 2021-04-26 PROCEDURE — 88175 CYTOPATH C/V AUTO FLUID REDO: CPT | Performed by: OBSTETRICS & GYNECOLOGY

## 2021-04-26 PROCEDURE — 87491 CHLMYD TRACH DNA AMP PROBE: CPT | Performed by: OBSTETRICS & GYNECOLOGY

## 2021-04-26 PROCEDURE — 87808 TRICHOMONAS ASSAY W/OPTIC: CPT | Performed by: OBSTETRICS & GYNECOLOGY

## 2021-04-26 PROCEDURE — G0101 CA SCREEN;PELVIC/BREAST EXAM: HCPCS | Performed by: OBSTETRICS & GYNECOLOGY

## 2021-04-26 PROCEDURE — 87624 HPV HI-RISK TYP POOLED RSLT: CPT | Performed by: OBSTETRICS & GYNECOLOGY

## 2021-04-26 RX ORDER — METRONIDAZOLE 7.5 MG/G
GEL VAGINAL
COMMUNITY
Start: 2021-04-24 | End: 2021-05-13 | Stop reason: ALTCHOICE

## 2021-04-26 RX ORDER — FLUTICASONE PROPIONATE 50 MCG
SPRAY, SUSPENSION (ML) NASAL
COMMUNITY
Start: 2021-02-02

## 2021-04-26 RX ORDER — FOLIC ACID 1 MG/1
1 TABLET ORAL DAILY
COMMUNITY
Start: 2021-02-22 | End: 2021-07-01

## 2021-04-26 RX ORDER — LIDOCAINE HYDROCHLORIDE 20 MG/ML
1 SOLUTION ORAL; TOPICAL EVERY OTHER DAY
COMMUNITY
Start: 2020-11-27 | End: 2021-05-18

## 2021-04-26 RX ORDER — ASPIRIN 325 MG
325 TABLET, DELAYED RELEASE (ENTERIC COATED) ORAL DAILY
COMMUNITY
Start: 2021-02-15 | End: 2021-05-13 | Stop reason: ALTCHOICE

## 2021-04-26 NOTE — PROGRESS NOTES
Elizabeth Jenkins is here for a checkup. Patient says that her periods are regular and not every period is heavy. Patient is due for her to her mammogram August of this year.     Patient says that when she saw her primary care physician she thought patient may have Take 1 mg by mouth daily. • aspirin  MG Oral Tab EC Take 325 mg by mouth daily. • FEROSUL 325 (65 Fe) MG Oral Tab Take 1 tablet by mouth every other day.      • Fluticasone Propionate 50 MCG/ACT Nasal Suspension APPLY 1 SPRAY IN EACH NOSTRIL D   Minutes of Exercise per Session:   Stress:       Feeling of Stress :   Social Connections:       Frequency of Communication with Friends and Family:       Frequency of Social Gatherings with Friends and Family:       Attends [employer] Services:       Act bacterial vaginosis, trichomonas, candida. Patient is still thinking of having Mirena IUD for her menorrhagia. She will let me know    2. Fibroids, intramural      3.  Vaginitis and vulvovaginitis        Rahat Tristan MD  10:59 AM  4/26/2021

## 2021-04-27 ENCOUNTER — TELEPHONE (OUTPATIENT)
Dept: OBGYN CLINIC | Facility: CLINIC | Age: 46
End: 2021-04-27

## 2021-04-27 RX ORDER — METRONIDAZOLE 500 MG/1
500 TABLET ORAL
Qty: 14 TABLET | Refills: 0 | Status: SHIPPED | OUTPATIENT
Start: 2021-04-27 | End: 2021-05-04

## 2021-04-27 NOTE — TELEPHONE ENCOUNTER
I called patient regarding her positive bacterial vaginosis vaginal culture and recommended that she takes metronidazole 500 mg twice daily for 7 days. Recommended that she takes it on full stomach and absolutely no alcohol while taking metronidazole.

## 2021-05-11 ENCOUNTER — OFFICE VISIT (OUTPATIENT)
Dept: INTERNAL MEDICINE CLINIC | Facility: CLINIC | Age: 46
End: 2021-05-11
Payer: MEDICARE

## 2021-05-11 VITALS
WEIGHT: 232 LBS | SYSTOLIC BLOOD PRESSURE: 110 MMHG | HEART RATE: 61 BPM | DIASTOLIC BLOOD PRESSURE: 70 MMHG | OXYGEN SATURATION: 99 % | BODY MASS INDEX: 37 KG/M2

## 2021-05-11 DIAGNOSIS — N92.0 MENORRHAGIA WITH REGULAR CYCLE: Primary | ICD-10-CM

## 2021-05-11 DIAGNOSIS — D25.9 UTERINE LEIOMYOMA, UNSPECIFIED LOCATION: ICD-10-CM

## 2021-05-11 DIAGNOSIS — D50.0 IRON DEFICIENCY ANEMIA DUE TO CHRONIC BLOOD LOSS: ICD-10-CM

## 2021-05-11 PROCEDURE — 99214 OFFICE O/P EST MOD 30 MIN: CPT | Performed by: INTERNAL MEDICINE

## 2021-05-11 NOTE — PROGRESS NOTES
HPI:    Patient ID: Lynne Grant is a 39year old female. HPI     Elizabeth Jenkins was last seen ny me in 10/2019. She has  iron deficiency anemia due to menorrhagia secondary to Uterine leiomyoma. Sees gyn, Dr. Maryana Delgado recently.  Thin prep was normal. HPV n Constitutional: Negative for activity change and fatigue. Eyes: Negative for visual disturbance. Respiratory: Negative for chest tightness. Gastrointestinal: Negative for abdominal pain. Genitourinary: Negative for dysuria and menstrual problem. Nasal Suspension APPLY 1 SPRAY IN EACH NOSTRIL DAILY. GENTLY BLOW NOSE PRIOR TO USE     • ALPRAZolam 0.5 MG Oral Tab TK 1 T PO Q NIGHT PRA  0   • folic acid 1 MG Oral Tab Take 1 mg by mouth daily.      • FEROSUL 325 (65 Fe) MG Oral Tab Take 1 tablet by mout loss  Continue iron supplement.               Patient affirmed understanding of plan and all questions were answered  30 mins  spent  on reviewing old records, labs, medications, radiology tests > 50 %  face to face encounter, counseling , answering all que

## 2021-05-13 NOTE — PATIENT INSTRUCTIONS
What Are Fibroids? Fibroids are also called myomas and leiomyomas. They are growths that usually form in the wall of your uterus. Fibroids are the most common tumor in women. They are almost always noncancer (benign) and harmless.  Fibroids are made up o healthcare professional's instructions.

## 2021-05-18 ENCOUNTER — OFFICE VISIT (OUTPATIENT)
Dept: INTERNAL MEDICINE CLINIC | Facility: CLINIC | Age: 46
End: 2021-05-18
Payer: MEDICARE

## 2021-05-18 VITALS
HEART RATE: 68 BPM | OXYGEN SATURATION: 99 % | DIASTOLIC BLOOD PRESSURE: 82 MMHG | SYSTOLIC BLOOD PRESSURE: 128 MMHG | WEIGHT: 228 LBS | HEIGHT: 66 IN | BODY MASS INDEX: 36.64 KG/M2

## 2021-05-18 DIAGNOSIS — N89.8 VAGINAL ITCHING: ICD-10-CM

## 2021-05-18 DIAGNOSIS — A60.00 GENITAL HERPES SIMPLEX, UNSPECIFIED SITE: Primary | ICD-10-CM

## 2021-05-18 PROCEDURE — 99213 OFFICE O/P EST LOW 20 MIN: CPT | Performed by: INTERNAL MEDICINE

## 2021-05-18 RX ORDER — FLUCONAZOLE 150 MG/1
150 TABLET ORAL ONCE
Qty: 1 TABLET | Refills: 0 | Status: SHIPPED | OUTPATIENT
Start: 2021-05-18 | End: 2021-05-18

## 2021-05-18 RX ORDER — VALACYCLOVIR HYDROCHLORIDE 500 MG/1
500 TABLET, FILM COATED ORAL 2 TIMES DAILY
Qty: 6 TABLET | Refills: 5 | Status: SHIPPED | OUTPATIENT
Start: 2021-05-18 | End: 2022-01-25

## 2021-05-18 NOTE — PROGRESS NOTES
HPI:    Patient ID: Too Salcido is a 39year old female. HPI    Patient had sex with new partner. Noticed blisters around above  coccyx area. ,. C/o vaginal itch. Menstruating today. . No unusual vaginal discharge. Intentional weight loss.  Comple Blisters   Psychiatric/Behavioral: Negative for behavioral problems. Current Outpatient Medications   Medication Sig Dispense Refill   • valACYclovir HCl 500 MG Oral Tab Take 1 tablet (500 mg total) by mouth 2 (two) times daily.  For 3 days 6 plan and all questions were answered

## 2021-05-20 NOTE — PATIENT INSTRUCTIONS
Genital Herpes    Genital herpes is a common sexually transmitted infection (STI). It is caused by the herpes simplex virus (HSV). One out of 5 teens and adults carry the herpes virus.  During an outbreak, it causes small blisters that break open, leaving blood clots. Aspirin should never be given to anyone younger than 25years of age who is ill with a viral infection or fever. It may cause severe liver or brain damage. · Your healthcare provider may prescribe antiviral medicine during the first outbreak. advice  Call your healthcare provider right away if any of these occur:  · Inability to urinate due to pain  · Swelling or increasing redness in the genital area  · Discharge from the vagina or penis  · Increasing back or abdominal pain  · Rash or joint pa

## 2021-06-01 ENCOUNTER — OFFICE VISIT (OUTPATIENT)
Dept: PODIATRY CLINIC | Facility: CLINIC | Age: 46
End: 2021-06-01
Payer: MEDICARE

## 2021-06-01 DIAGNOSIS — D36.10 NEUROMA: Primary | ICD-10-CM

## 2021-06-01 PROCEDURE — 99213 OFFICE O/P EST LOW 20 MIN: CPT | Performed by: PODIATRIST

## 2021-06-01 RX ORDER — IBUPROFEN 800 MG/1
800 TABLET ORAL 2 TIMES DAILY
COMMUNITY
End: 2021-07-02 | Stop reason: ALTCHOICE

## 2021-06-01 NOTE — PROGRESS NOTES
HPI:    Patient ID: Angeles Nichole is a 55year old female. 19-year-old female presents for follow-up in reference to right forefoot pain.   Patient states while taking ibuprofen at a therapeutic dose she had essentially complete resolution of symptoms sh encounter.       Meds This Visit:  Requested Prescriptions      No prescriptions requested or ordered in this encounter       Imaging & Referrals:  None       #[id number]

## 2021-07-01 ENCOUNTER — OFFICE VISIT (OUTPATIENT)
Dept: INTERNAL MEDICINE CLINIC | Facility: CLINIC | Age: 46
End: 2021-07-01
Payer: MEDICARE

## 2021-07-01 VITALS
HEIGHT: 66 IN | DIASTOLIC BLOOD PRESSURE: 74 MMHG | WEIGHT: 219.19 LBS | BODY MASS INDEX: 35.23 KG/M2 | TEMPERATURE: 97 F | SYSTOLIC BLOOD PRESSURE: 121 MMHG

## 2021-07-01 DIAGNOSIS — S39.011A STRAIN OF ABDOMINAL MUSCLE, INITIAL ENCOUNTER: Primary | ICD-10-CM

## 2021-07-01 DIAGNOSIS — E66.09 CLASS 2 OBESITY DUE TO EXCESS CALORIES WITHOUT SERIOUS COMORBIDITY WITH BODY MASS INDEX (BMI) OF 35.0 TO 35.9 IN ADULT: ICD-10-CM

## 2021-07-01 PROCEDURE — 99214 OFFICE O/P EST MOD 30 MIN: CPT | Performed by: INTERNAL MEDICINE

## 2021-07-01 RX ORDER — IBUPROFEN 600 MG/1
600 TABLET ORAL EVERY 8 HOURS PRN
Qty: 15 TABLET | Refills: 0 | Status: SHIPPED | OUTPATIENT
Start: 2021-07-01

## 2021-07-03 NOTE — PROGRESS NOTES
HPI:    Patient ID: Floria Nyhan is a 55year old female. HPI    Patient with history of obesity, GERD.   Presented with sudden onset of  left lower abdominal pain which occurred 1 day ago when she was turning to the left to reach  for some stuff whil Currently      Comment: wine    Drug use: No       Review of Systems   Constitutional: Positive for activity change. Negative for appetite change, fatigue and fever. HENT: Negative for congestion. Eyes: Negative for visual disturbance.    Respiratory: Abdominal:      General: Abdomen is flat. Bowel sounds are normal. There is no distension. Palpations: Abdomen is soft. Comments: Tenderness left lower quadrant. No palpable mass. No hernia. No rebound ,no guarding.    Musculoskeletal:

## 2021-07-03 NOTE — PATIENT INSTRUCTIONS
Muscle Strain in the Abdomen  A muscle strain is a stretching or tearing of the muscle fibers. It is also called a pulled muscle. The abdomen is protected by a thick wall of muscle in the front and sides.  These muscles help with twisting and bending forw [facility]. All rights reserved. This information is not intended as a substitute for professional medical care. Always follow your healthcare professional's instructions.

## 2021-07-06 ENCOUNTER — OFFICE VISIT (OUTPATIENT)
Dept: PODIATRY CLINIC | Facility: CLINIC | Age: 46
End: 2021-07-06
Payer: MEDICARE

## 2021-07-06 VITALS — HEART RATE: 52 BPM | DIASTOLIC BLOOD PRESSURE: 73 MMHG | SYSTOLIC BLOOD PRESSURE: 114 MMHG

## 2021-07-06 DIAGNOSIS — D36.10 NEUROMA: Primary | ICD-10-CM

## 2021-07-06 PROCEDURE — 64455 NJX AA&/STRD PLTR COM DG NRV: CPT | Performed by: PODIATRIST

## 2021-07-06 RX ORDER — METHYLPREDNISOLONE ACETATE 80 MG/ML
20 INJECTION, SUSPENSION INTRA-ARTICULAR; INTRALESIONAL; INTRAMUSCULAR; SOFT TISSUE ONCE
Status: COMPLETED | OUTPATIENT
Start: 2021-07-06 | End: 2021-07-06

## 2021-07-06 RX ORDER — FOLIC ACID 1 MG/1
1 TABLET ORAL DAILY
COMMUNITY
Start: 2021-07-02

## 2021-07-06 RX ADMIN — METHYLPREDNISOLONE ACETATE 20 MG: 80 INJECTION, SUSPENSION INTRA-ARTICULAR; INTRALESIONAL; INTRAMUSCULAR; SOFT TISSUE at 10:05:00

## 2021-07-06 NOTE — PROGRESS NOTES
HPI:    Patient ID: Kevin Mitchell is a 55year old female. This 49-year-old female presents for follow-up in reference to right forefoot pain.   When taking ibuprofen she has some degree of relief but its not consistent and she continues to have daily di Encounter      trigger point (1 or 2 muscles)      Meds This Visit:  Requested Prescriptions      No prescriptions requested or ordered in this encounter       Imaging & Referrals:  None       WAMPARO#[id number]

## 2021-08-04 ENCOUNTER — OFFICE VISIT (OUTPATIENT)
Dept: PODIATRY CLINIC | Facility: CLINIC | Age: 46
End: 2021-08-04
Payer: MEDICARE

## 2021-08-04 VITALS — HEIGHT: 66 IN | WEIGHT: 216 LBS | BODY MASS INDEX: 34.72 KG/M2

## 2021-08-04 DIAGNOSIS — D36.10 NEUROMA: Primary | ICD-10-CM

## 2021-08-04 PROCEDURE — 99213 OFFICE O/P EST LOW 20 MIN: CPT | Performed by: PODIATRIST

## 2021-08-04 NOTE — PROGRESS NOTES
HPI:    Patient ID: Zena Howard is a 55year old female. 59-year-old female presents post injection to second interspace of the right forefoot. This injection was July 6 of this year.   As a result of the injection she would state that she is about were placed in this encounter.       Meds This Visit:  Requested Prescriptions      No prescriptions requested or ordered in this encounter       Imaging & Referrals:  None       ZK#[id number]

## 2021-09-07 ENCOUNTER — OFFICE VISIT (OUTPATIENT)
Dept: PODIATRY CLINIC | Facility: CLINIC | Age: 46
End: 2021-09-07
Payer: MEDICARE

## 2021-09-07 VITALS — SYSTOLIC BLOOD PRESSURE: 97 MMHG | RESPIRATION RATE: 18 BRPM | HEART RATE: 59 BPM | DIASTOLIC BLOOD PRESSURE: 63 MMHG

## 2021-09-07 DIAGNOSIS — D36.10 NEUROMA: Primary | ICD-10-CM

## 2021-09-07 PROCEDURE — 64455 NJX AA&/STRD PLTR COM DG NRV: CPT | Performed by: PODIATRIST

## 2021-09-07 RX ORDER — METHYLPREDNISOLONE ACETATE 80 MG/ML
20 INJECTION, SUSPENSION INTRA-ARTICULAR; INTRALESIONAL; INTRAMUSCULAR; SOFT TISSUE ONCE
Status: COMPLETED | OUTPATIENT
Start: 2021-09-07 | End: 2021-09-07

## 2021-09-07 RX ADMIN — METHYLPREDNISOLONE ACETATE 20 MG: 80 INJECTION, SUSPENSION INTRA-ARTICULAR; INTRALESIONAL; INTRAMUSCULAR; SOFT TISSUE at 10:29:00

## 2021-09-07 NOTE — PROCEDURES
Per verbal order from Dr. Duyen Egan, draw up 0.5ml of 0.5% Marcaine and 20 mg of Depo-Medrol for cortisone injection to right forefoot Dago Bush RN  Patient provided education handout for cortisone injection.    Patient left office prior to obtaining post injec

## 2021-09-07 NOTE — PROGRESS NOTES
HPI:    Patient ID: Alison Alcocer is a 55year old female. This 49-year-old female presents to the office today with continued right forefoot pain.   She states that the first injection given to her for neuroma second interspace provided her with almost written consent. I injected the dorsal second interspace of the right foot through a sterile technique utilizing Marcaine and Depo-Medrol 20 mg. She was given postinjection instructions she is well-informed and I will reevaluate in approximately 1 month.

## 2021-11-23 ENCOUNTER — OFFICE VISIT (OUTPATIENT)
Dept: PODIATRY CLINIC | Facility: CLINIC | Age: 46
End: 2021-11-23
Payer: MEDICARE

## 2021-11-23 VITALS — WEIGHT: 225 LBS | HEIGHT: 66 IN | BODY MASS INDEX: 36.16 KG/M2

## 2021-11-23 DIAGNOSIS — D36.10 NEUROMA: Primary | ICD-10-CM

## 2021-11-23 PROCEDURE — 99213 OFFICE O/P EST LOW 20 MIN: CPT | Performed by: PODIATRIST

## 2021-11-23 NOTE — PROGRESS NOTES
HPI:    Patient ID: Mee Anaya is a 55year old female. 54-year-old female states that she is here for a foot evaluation. The last time I saw her was in June.   She was treated for neuroma and she states that those symptoms have essentially gone aw encounter.       Meds This Visit:  Requested Prescriptions      No prescriptions requested or ordered in this encounter       Imaging & Referrals:  None       #[id number]

## 2022-01-24 ENCOUNTER — LAB ENCOUNTER (OUTPATIENT)
Dept: LAB | Facility: HOSPITAL | Age: 47
End: 2022-01-24
Attending: INTERNAL MEDICINE
Payer: MEDICARE

## 2022-01-24 ENCOUNTER — OFFICE VISIT (OUTPATIENT)
Dept: INTERNAL MEDICINE CLINIC | Facility: CLINIC | Age: 47
End: 2022-01-24
Payer: MEDICARE

## 2022-01-24 VITALS
DIASTOLIC BLOOD PRESSURE: 82 MMHG | HEIGHT: 66 IN | SYSTOLIC BLOOD PRESSURE: 118 MMHG | HEART RATE: 67 BPM | WEIGHT: 216.19 LBS | BODY MASS INDEX: 34.74 KG/M2 | OXYGEN SATURATION: 97 %

## 2022-01-24 DIAGNOSIS — N93.0 POSTCOITAL BLEEDING: ICD-10-CM

## 2022-01-24 DIAGNOSIS — N92.6 MENSTRUAL DISORDER: ICD-10-CM

## 2022-01-24 DIAGNOSIS — E78.2 MIXED HYPERLIPIDEMIA: ICD-10-CM

## 2022-01-24 DIAGNOSIS — F41.9 ANXIETY: ICD-10-CM

## 2022-01-24 DIAGNOSIS — R73.03 PREDIABETES: ICD-10-CM

## 2022-01-24 DIAGNOSIS — E66.09 CLASS 1 OBESITY DUE TO EXCESS CALORIES WITH SERIOUS COMORBIDITY AND BODY MASS INDEX (BMI) OF 34.0 TO 34.9 IN ADULT: ICD-10-CM

## 2022-01-24 DIAGNOSIS — Z12.31 VISIT FOR SCREENING MAMMOGRAM: ICD-10-CM

## 2022-01-24 DIAGNOSIS — R07.89 ATYPICAL CHEST PAIN: Primary | ICD-10-CM

## 2022-01-24 LAB
ESTRADIOL SERPL-MCNC: 56.6 PG/ML
FSH SERPL-ACNC: 34.1 MIU/ML

## 2022-01-24 PROCEDURE — 82670 ASSAY OF TOTAL ESTRADIOL: CPT

## 2022-01-24 PROCEDURE — 93010 ELECTROCARDIOGRAM REPORT: CPT | Performed by: INTERNAL MEDICINE

## 2022-01-24 PROCEDURE — 36415 COLL VENOUS BLD VENIPUNCTURE: CPT

## 2022-01-24 PROCEDURE — 99214 OFFICE O/P EST MOD 30 MIN: CPT | Performed by: INTERNAL MEDICINE

## 2022-01-24 PROCEDURE — 93000 ELECTROCARDIOGRAM COMPLETE: CPT | Performed by: INTERNAL MEDICINE

## 2022-01-24 PROCEDURE — 93005 ELECTROCARDIOGRAM TRACING: CPT

## 2022-01-24 PROCEDURE — 83001 ASSAY OF GONADOTROPIN (FSH): CPT

## 2022-01-24 NOTE — PROGRESS NOTES
Natalie Boland is a 55year old female    H/o hyperlipidemia, ID, GERD, osteoarthritis, menorrhagia with iron deficiency anemia, obesity. C/o sudden onset  right upper chest pain lasting few seconds. Pain is sharp of moderate intensity, non-radiating . NOSTRIL DAILY. GENTLY BLOW NOSE PRIOR TO USE     • ALPRAZolam 0.5 MG Oral Tab TK 1 T PO Q NIGHT PRA  0   • Cyanocobalamin 1000 MCG Sublingual SL Tab Place 1,000 mcg under the tongue daily.  (Patient not taking: Reported on 3/02/8380)     • folic acid 1 MG O apparent distress  SKIN: no rashes,no suspicious lesions  HEENT: atraumatic, normocephalic,ears and throat are clear  EYES:PERRLA, EOM,conjunctiva are clear  NECK: supple,no adenopathy,no bruits  CHEST: no chest tenderness  LUNGS: clear to auscultation  CA hyperlipidemia   -Counseled on low-fat diet.   Avoid transfatty and saturated fat    Prediabetes  -low carb diet and portion control  -weight loss encouraged    Class I obesity   -Low-fat, low carbs, high-fiber, lean protein diet      F/u in 6 mos

## 2022-01-25 PROBLEM — R73.03 PREDIABETES: Status: ACTIVE | Noted: 2022-01-25

## 2022-01-25 PROBLEM — E66.811 CLASS 1 OBESITY DUE TO EXCESS CALORIES WITH SERIOUS COMORBIDITY AND BODY MASS INDEX (BMI) OF 34.0 TO 34.9 IN ADULT: Status: ACTIVE | Noted: 2022-01-25

## 2022-01-25 PROBLEM — E66.09 CLASS 1 OBESITY DUE TO EXCESS CALORIES WITH SERIOUS COMORBIDITY AND BODY MASS INDEX (BMI) OF 34.0 TO 34.9 IN ADULT: Status: ACTIVE | Noted: 2022-01-25

## 2022-01-25 PROBLEM — E78.2 MIXED HYPERLIPIDEMIA: Status: ACTIVE | Noted: 2022-01-25

## 2022-01-25 RX ORDER — VALACYCLOVIR HYDROCHLORIDE 500 MG/1
500 TABLET, FILM COATED ORAL 2 TIMES DAILY
Qty: 6 TABLET | Refills: 5 | Status: SHIPPED | OUTPATIENT
Start: 2022-01-25

## 2022-01-25 RX ORDER — ALPRAZOLAM 0.5 MG/1
TABLET ORAL
Qty: 30 TABLET | Refills: 0 | Status: SHIPPED | OUTPATIENT
Start: 2022-01-25

## 2022-02-03 ENCOUNTER — OFFICE VISIT (OUTPATIENT)
Dept: OBGYN CLINIC | Facility: CLINIC | Age: 47
End: 2022-02-03
Payer: MEDICARE

## 2022-02-03 VITALS
BODY MASS INDEX: 34.23 KG/M2 | SYSTOLIC BLOOD PRESSURE: 108 MMHG | DIASTOLIC BLOOD PRESSURE: 68 MMHG | HEIGHT: 66 IN | WEIGHT: 213 LBS

## 2022-02-03 DIAGNOSIS — D25.9 UTERINE LEIOMYOMA, UNSPECIFIED LOCATION: ICD-10-CM

## 2022-02-03 DIAGNOSIS — N93.9 ABNORMAL UTERINE BLEEDING (AUB): Primary | ICD-10-CM

## 2022-02-03 PROCEDURE — 99214 OFFICE O/P EST MOD 30 MIN: CPT | Performed by: OBSTETRICS & GYNECOLOGY

## 2022-02-03 RX ORDER — MISOPROSTOL 200 UG/1
400 TABLET ORAL
Qty: 2 TABLET | Refills: 0 | Status: SHIPPED | OUTPATIENT
Start: 2022-02-03 | End: 2022-02-04

## 2022-03-25 ENCOUNTER — ORDER TRANSCRIPTION (OUTPATIENT)
Dept: ADMINISTRATIVE | Facility: HOSPITAL | Age: 47
End: 2022-03-25

## 2022-04-06 ENCOUNTER — OFFICE VISIT (OUTPATIENT)
Dept: INTERNAL MEDICINE CLINIC | Facility: CLINIC | Age: 47
End: 2022-04-06
Payer: MEDICARE

## 2022-04-06 VITALS
DIASTOLIC BLOOD PRESSURE: 70 MMHG | WEIGHT: 216 LBS | OXYGEN SATURATION: 98 % | HEIGHT: 66 IN | SYSTOLIC BLOOD PRESSURE: 116 MMHG | HEART RATE: 73 BPM | BODY MASS INDEX: 34.72 KG/M2

## 2022-04-06 DIAGNOSIS — N92.6 MENSTRUAL DISORDER: ICD-10-CM

## 2022-04-06 DIAGNOSIS — L24.9 IRRITANT CONTACT DERMATITIS, UNSPECIFIED TRIGGER: Primary | ICD-10-CM

## 2022-04-06 DIAGNOSIS — L85.3 DRY SKIN: ICD-10-CM

## 2022-04-06 PROCEDURE — 99213 OFFICE O/P EST LOW 20 MIN: CPT | Performed by: INTERNAL MEDICINE

## 2022-04-06 RX ORDER — TRIAMCINOLONE ACETONIDE 0.25 MG/G
1 OINTMENT TOPICAL 2 TIMES DAILY
Qty: 80 G | Refills: 0 | Status: SHIPPED | OUTPATIENT
Start: 2022-04-06

## 2022-04-06 RX ORDER — VALACYCLOVIR HYDROCHLORIDE 500 MG/1
500 TABLET, FILM COATED ORAL 2 TIMES DAILY
COMMUNITY

## 2022-04-07 PROBLEM — E66.01 MORBID OBESITY WITH BMI OF 40.0-44.9, ADULT (HCC): Status: RESOLVED | Noted: 2017-08-24 | Resolved: 2022-04-07

## 2022-04-07 PROBLEM — R53.82 CHRONIC FATIGUE: Status: RESOLVED | Noted: 2017-08-24 | Resolved: 2022-04-07

## 2022-04-12 ENCOUNTER — OFFICE VISIT (OUTPATIENT)
Dept: PODIATRY CLINIC | Facility: CLINIC | Age: 47
End: 2022-04-12
Payer: MEDICARE

## 2022-04-12 VITALS — DIASTOLIC BLOOD PRESSURE: 65 MMHG | SYSTOLIC BLOOD PRESSURE: 106 MMHG | RESPIRATION RATE: 18 BRPM | HEART RATE: 66 BPM

## 2022-04-12 DIAGNOSIS — D36.10 NEUROMA: Primary | ICD-10-CM

## 2022-04-12 PROCEDURE — 64455 NJX AA&/STRD PLTR COM DG NRV: CPT | Performed by: PODIATRIST

## 2022-04-12 RX ORDER — METHYLPREDNISOLONE ACETATE 80 MG/ML
20 INJECTION, SUSPENSION INTRA-ARTICULAR; INTRALESIONAL; INTRAMUSCULAR; SOFT TISSUE ONCE
Status: COMPLETED | OUTPATIENT
Start: 2022-04-12 | End: 2022-04-12

## 2022-04-12 RX ADMIN — METHYLPREDNISOLONE ACETATE 20 MG: 80 INJECTION, SUSPENSION INTRA-ARTICULAR; INTRALESIONAL; INTRAMUSCULAR; SOFT TISSUE at 18:24:00

## 2022-04-12 NOTE — PROCEDURES
Per verbal order from Dr. Ericka Mauricio, draw up 0.5ml of 0.5% Marcaine and 20 mg of Depo-Medrol for cortisone injection to right foot second interspace  Ryder Alberto RN  Patient provided education handout for cortisone injection.

## 2022-04-20 ENCOUNTER — LAB ENCOUNTER (OUTPATIENT)
Dept: LAB | Age: 47
End: 2022-04-20
Attending: NURSE PRACTITIONER
Payer: MEDICARE

## 2022-04-20 ENCOUNTER — TELEPHONE (OUTPATIENT)
Dept: GASTROENTEROLOGY | Facility: CLINIC | Age: 47
End: 2022-04-20

## 2022-04-20 ENCOUNTER — OFFICE VISIT (OUTPATIENT)
Dept: GASTROENTEROLOGY | Facility: CLINIC | Age: 47
End: 2022-04-20
Payer: MEDICARE

## 2022-04-20 VITALS
DIASTOLIC BLOOD PRESSURE: 69 MMHG | HEIGHT: 66 IN | BODY MASS INDEX: 34.72 KG/M2 | WEIGHT: 216 LBS | HEART RATE: 56 BPM | SYSTOLIC BLOOD PRESSURE: 107 MMHG

## 2022-04-20 DIAGNOSIS — R11.0 NAUSEA: ICD-10-CM

## 2022-04-20 DIAGNOSIS — R10.9 ABDOMINAL CRAMPING: ICD-10-CM

## 2022-04-20 DIAGNOSIS — R14.0 BLOATING: Primary | ICD-10-CM

## 2022-04-20 DIAGNOSIS — K59.00 CONSTIPATION, UNSPECIFIED CONSTIPATION TYPE: ICD-10-CM

## 2022-04-20 DIAGNOSIS — K62.5 BRBPR (BRIGHT RED BLOOD PER RECTUM): ICD-10-CM

## 2022-04-20 DIAGNOSIS — R14.0 BLOATING: ICD-10-CM

## 2022-04-20 LAB
ALBUMIN SERPL-MCNC: 4.1 G/DL (ref 3.4–5)
ALBUMIN/GLOB SERPL: 1 {RATIO} (ref 1–2)
ALP LIVER SERPL-CCNC: 69 U/L
ALT SERPL-CCNC: 19 U/L
ANION GAP SERPL CALC-SCNC: 3 MMOL/L (ref 0–18)
AST SERPL-CCNC: 20 U/L (ref 15–37)
BASOPHILS # BLD AUTO: 0.04 X10(3) UL (ref 0–0.2)
BASOPHILS NFR BLD AUTO: 0.9 %
BILIRUB SERPL-MCNC: 0.6 MG/DL (ref 0.1–2)
BUN BLD-MCNC: 14 MG/DL (ref 7–18)
BUN/CREAT SERPL: 16.3 (ref 10–20)
CALCIUM BLD-MCNC: 9.3 MG/DL (ref 8.5–10.1)
CHLORIDE SERPL-SCNC: 105 MMOL/L (ref 98–112)
CO2 SERPL-SCNC: 29 MMOL/L (ref 21–32)
CREAT BLD-MCNC: 0.86 MG/DL
DEPRECATED RDW RBC AUTO: 46.9 FL (ref 35.1–46.3)
EOSINOPHIL # BLD AUTO: 0.07 X10(3) UL (ref 0–0.7)
EOSINOPHIL NFR BLD AUTO: 1.6 %
ERYTHROCYTE [DISTWIDTH] IN BLOOD BY AUTOMATED COUNT: 13 % (ref 11–15)
FASTING STATUS PATIENT QL REPORTED: YES
GLOBULIN PLAS-MCNC: 4.2 G/DL (ref 2.8–4.4)
GLUCOSE BLD-MCNC: 79 MG/DL (ref 70–99)
HCT VFR BLD AUTO: 45 %
HGB BLD-MCNC: 14.6 G/DL
IMM GRANULOCYTES # BLD AUTO: 0.01 X10(3) UL (ref 0–1)
IMM GRANULOCYTES NFR BLD: 0.2 %
LYMPHOCYTES # BLD AUTO: 1.52 X10(3) UL (ref 1–4)
LYMPHOCYTES NFR BLD AUTO: 34.1 %
MCH RBC QN AUTO: 31.5 PG (ref 26–34)
MCHC RBC AUTO-ENTMCNC: 32.4 G/DL (ref 31–37)
MCV RBC AUTO: 97.2 FL
MONOCYTES # BLD AUTO: 0.37 X10(3) UL (ref 0.1–1)
MONOCYTES NFR BLD AUTO: 8.3 %
NEUTROPHILS # BLD AUTO: 2.45 X10 (3) UL (ref 1.5–7.7)
NEUTROPHILS # BLD AUTO: 2.45 X10(3) UL (ref 1.5–7.7)
NEUTROPHILS NFR BLD AUTO: 54.9 %
OSMOLALITY SERPL CALC.SUM OF ELEC: 283 MOSM/KG (ref 275–295)
PLATELET # BLD AUTO: 220 10(3)UL (ref 150–450)
POTASSIUM SERPL-SCNC: 4.2 MMOL/L (ref 3.5–5.1)
PROT SERPL-MCNC: 8.3 G/DL (ref 6.4–8.2)
RBC # BLD AUTO: 4.63 X10(6)UL
SODIUM SERPL-SCNC: 137 MMOL/L (ref 136–145)
WBC # BLD AUTO: 4.5 X10(3) UL (ref 4–11)

## 2022-04-20 PROCEDURE — 36415 COLL VENOUS BLD VENIPUNCTURE: CPT

## 2022-04-20 PROCEDURE — 99215 OFFICE O/P EST HI 40 MIN: CPT | Performed by: NURSE PRACTITIONER

## 2022-04-20 PROCEDURE — 80053 COMPREHEN METABOLIC PANEL: CPT

## 2022-04-20 PROCEDURE — 85025 COMPLETE CBC W/AUTO DIFF WBC: CPT

## 2022-04-20 RX ORDER — HYDROCORTISONE 25 MG/G
CREAM TOPICAL
COMMUNITY
Start: 2022-02-09

## 2022-04-20 RX ORDER — POLYETHYLENE GLYCOL 3350, SODIUM CHLORIDE, SODIUM BICARBONATE, POTASSIUM CHLORIDE 420; 11.2; 5.72; 1.48 G/4L; G/4L; G/4L; G/4L
POWDER, FOR SOLUTION ORAL
Qty: 4000 ML | Refills: 0 | Status: SHIPPED | OUTPATIENT
Start: 2022-04-20

## 2022-04-20 RX ORDER — ONDANSETRON 4 MG/1
TABLET, ORALLY DISINTEGRATING ORAL
COMMUNITY
Start: 2022-02-14

## 2022-04-20 RX ORDER — GABAPENTIN 300 MG/1
300 CAPSULE ORAL 3 TIMES DAILY PRN
COMMUNITY
Start: 2022-02-25

## 2022-04-20 RX ORDER — DICYCLOMINE HCL 20 MG
20 TABLET ORAL
COMMUNITY
Start: 2022-02-14

## 2022-04-20 NOTE — TELEPHONE ENCOUNTER
Scheduled for:  Colonoscopy [address] [facility]  Provider Name: Dr Rachell Munguia   Date: Mon 6/06/2022  [facility]  Sedation: MAC   Time: 12:30 pm, (pt is aware that [facility] will call the day before to confirm arrival time)   Prep: split trilyte   Meds/Allergies Reconciled?:  NKDA  Diagnosis with codes: constipation K59.00, BRBPR K62.5, bloating R14.0, nausea R11.0, abdominal discomfort R109    Was patient informed to call insurance with codes (Y/N): Yes   Referral sent?:   Yes  [facility] or [facility] notified?: Electronic case request was sent to [facility] via [employer]. Medication Orders:  Pt is aware to NOT take iron pills, herbal meds and diet supplements for 7 days before exam. Also to NOT take any form of alcohol, recreational drugs and any forms of ED meds 24 hours before exam.     Misc Orders:  Patient was informed that they will need a COVID 19 test prior to their procedure. Patient verbally understood & will await a phone call from [facility] to schedule. Further instructions given by staff:  Instructions given and pt verbalized understanding

## 2022-05-13 ENCOUNTER — OFFICE VISIT (OUTPATIENT)
Dept: PODIATRY CLINIC | Facility: CLINIC | Age: 47
End: 2022-05-13
Payer: MEDICARE

## 2022-05-13 DIAGNOSIS — D36.10 NEUROMA: Primary | ICD-10-CM

## 2022-05-13 PROCEDURE — 99213 OFFICE O/P EST LOW 20 MIN: CPT | Performed by: PODIATRIST

## 2022-06-03 ENCOUNTER — LAB REQUISITION (OUTPATIENT)
Dept: SURGERY | Age: 47
End: 2022-06-03
Payer: MEDICARE

## 2022-06-03 DIAGNOSIS — Z01.818 PREOP EXAMINATION: ICD-10-CM

## 2022-06-04 LAB — SARS-COV-2 RNA RESP QL NAA+PROBE: NOT DETECTED

## 2022-06-29 ENCOUNTER — TELEPHONE (OUTPATIENT)
Dept: GASTROENTEROLOGY | Facility: CLINIC | Age: 47
End: 2022-06-29

## 2022-06-29 ENCOUNTER — HOSPITAL ENCOUNTER (OUTPATIENT)
Dept: CT IMAGING | Facility: HOSPITAL | Age: 47
Discharge: HOME | End: 2022-06-29
Attending: INTERNAL MEDICINE

## 2022-06-29 DIAGNOSIS — K62.5 BRBPR (BRIGHT RED BLOOD PER RECTUM): ICD-10-CM

## 2022-06-29 DIAGNOSIS — Z13.6 SCREENING FOR HEART DISEASE: ICD-10-CM

## 2022-06-29 DIAGNOSIS — R14.0 ABDOMINAL BLOATING: ICD-10-CM

## 2022-06-29 DIAGNOSIS — R10.9 ABDOMINAL CRAMPING: Primary | ICD-10-CM

## 2022-06-29 DIAGNOSIS — R11.0 NAUSEA: ICD-10-CM

## 2022-06-29 LAB
POCT GLUCOSE CHOLESTECH: 68 (ref 70–99)
POCT HDL: 78 (ref 40–60)
POCT LDL: 127 (ref 0–99)
POCT TOTAL CHOLESTEROL: 214 (ref 110–200)
POCT TRIGLYCERIDES: 46 (ref 1–149)

## 2022-06-29 NOTE — TELEPHONE ENCOUNTER
Dr Naeem Luciano     Patients Colonoscopy /EGD procedure has been rescheduled patient had opened last prep sent is requesting ot resend new prep to [facility] pharmacy on file       Thank you   [facility] Bar[facility] #[id number] - [address] [place] [address] AT [address] [place] [address], [phone], [address] [place], [place] [id number]   Phone: [phone] Fax: [phone]   Hours: Not open 24 hours

## 2022-06-30 ENCOUNTER — ORDER TRANSCRIPTION (OUTPATIENT)
Dept: ADMINISTRATIVE | Facility: HOSPITAL | Age: 47
End: 2022-06-30

## 2022-06-30 DIAGNOSIS — Z13.9 ENCOUNTER FOR SCREENING: Primary | ICD-10-CM

## 2022-06-30 NOTE — PROGRESS NOTES
Date of Service 6/29/2022    Ramila GARCIA  Date of Birth 5/23/1975    Patient Age: 52year old    PCP: Kemi Garcia MD  No address on file    Consult Type  Type Scan/Screening: Heart Scan  Preliminary Heart Scan Score: 19.5 (Elevated cholesterol, Pre-diabetic, weight)                Body Mass Index  There is no height or weight on file to calculate BMI. Lipid Profile  Cholesterol: 214, done on 6/29/2022. HDL Cholesterol: 78, done on 6/29/2022. LDL Cholesterol: 127, done on 6/29/2022. TriGlycerides 46, done on 6/29/2022. Nurse Review  Risk factor information and results reviewed with Nurse: Yes    Recommended Follow Up:  Consult your physician regarding[de-identified] Final Heart Scan Report; Discuss potential for Incidental Finding; Cholesterol Results (Non-Fasting)    Free PV Screening offered to patient. Recommendations for Change:     Cholesterol Modification (goal of therapy depends upon your risk): Decrease LDL (Lousy/Bad) Ideal <100 (currently making dietary changes to improve cholesterol and blood sugar  numbers)  Exercise: Enhance Current Program (Aim to progress to 150 minutes of cardio/week)  Smoking Cessation: No Change Needed  Weight Management: Decrease Current Weight     Repeat Heart Scan: 3 Years if Calcium Score is > 0. 0; Discuss with your Physician     Other[de-identified] Nonfasting glucose today 68. Blood pressure 110/74    Juani Recommended Resources:  Recommended Resources: [address] [place] [phone];[facility] Classes, Medical Services and [facility]. Health. [facility], RN        Please Contact the Nurse Heart Line with any Questions or Concerns [phone].

## 2022-08-15 ENCOUNTER — OFFICE VISIT (OUTPATIENT)
Dept: OBGYN CLINIC | Facility: CLINIC | Age: 47
End: 2022-08-15
Payer: MEDICARE

## 2022-08-15 VITALS
BODY MASS INDEX: 33 KG/M2 | DIASTOLIC BLOOD PRESSURE: 68 MMHG | HEART RATE: 59 BPM | WEIGHT: 202.19 LBS | SYSTOLIC BLOOD PRESSURE: 107 MMHG

## 2022-08-15 DIAGNOSIS — D25.9 UTERINE LEIOMYOMA, UNSPECIFIED LOCATION: Primary | ICD-10-CM

## 2022-08-15 DIAGNOSIS — N92.4 ABNORMAL PERIMENOPAUSAL BLEEDING: ICD-10-CM

## 2022-08-15 DIAGNOSIS — R93.89 THICKENED ENDOMETRIUM: ICD-10-CM

## 2022-08-15 PROCEDURE — 99204 OFFICE O/P NEW MOD 45 MIN: CPT | Performed by: OBSTETRICS & GYNECOLOGY

## 2022-08-16 ENCOUNTER — TELEPHONE (OUTPATIENT)
Dept: OBGYN CLINIC | Facility: CLINIC | Age: 47
End: 2022-08-16

## 2022-08-16 DIAGNOSIS — N92.4 ABNORMAL PERIMENOPAUSAL BLEEDING: ICD-10-CM

## 2022-08-16 DIAGNOSIS — R93.89 THICKENED ENDOMETRIUM: Primary | ICD-10-CM

## 2022-08-16 NOTE — TELEPHONE ENCOUNTER
Please schedule the following surgery:    Procedure: Hysteroscopy with D&C  Assist: (Y/N or none) no  Date: Next available  Dx: (R93.89) Thickened endometrium    (N92.4) Abnormal perimenopausal bleeding  Pre-op appt: (Y/N or n/a) no  Admission type: (IN/OUT/OBVS) outpatient  Procedure length time: 60 minutes  Recovery time:  Medical Clearance: (Y/N) no  Department of discharge:  Same Day Surgery-yes  Message to nurses: Please have Myosure system available

## 2022-08-22 ENCOUNTER — LAB ENCOUNTER (OUTPATIENT)
Dept: LAB | Facility: HOSPITAL | Age: 47
End: 2022-08-22
Attending: OBSTETRICS & GYNECOLOGY
Payer: MEDICARE

## 2022-08-22 DIAGNOSIS — Z01.818 PREOP TESTING: ICD-10-CM

## 2022-08-23 LAB — SARS-COV-2 RNA RESP QL NAA+PROBE: NOT DETECTED

## 2022-08-24 ENCOUNTER — HOSPITAL ENCOUNTER (OUTPATIENT)
Facility: HOSPITAL | Age: 47
Setting detail: HOSPITAL OUTPATIENT SURGERY
Discharge: HOME | End: 2022-08-24
Attending: OBSTETRICS & GYNECOLOGY | Admitting: OBSTETRICS & GYNECOLOGY
Payer: MEDICARE

## 2022-08-24 ENCOUNTER — ANESTHESIA (OUTPATIENT)
Dept: SURGERY | Facility: HOSPITAL | Age: 47
End: 2022-08-24
Payer: MEDICARE

## 2022-08-24 ENCOUNTER — ANESTHESIA EVENT (OUTPATIENT)
Dept: SURGERY | Facility: HOSPITAL | Age: 47
End: 2022-08-24
Payer: MEDICARE

## 2022-08-24 VITALS
TEMPERATURE: 97 F | WEIGHT: 199 LBS | BODY MASS INDEX: 31.98 KG/M2 | RESPIRATION RATE: 16 BRPM | SYSTOLIC BLOOD PRESSURE: 117 MMHG | OXYGEN SATURATION: 100 % | HEART RATE: 64 BPM | HEIGHT: 66 IN | DIASTOLIC BLOOD PRESSURE: 67 MMHG

## 2022-08-24 DIAGNOSIS — N92.4 ABNORMAL PERIMENOPAUSAL BLEEDING: ICD-10-CM

## 2022-08-24 DIAGNOSIS — Z01.818 PREOP TESTING: Primary | ICD-10-CM

## 2022-08-24 DIAGNOSIS — R93.89 THICKENED ENDOMETRIUM: ICD-10-CM

## 2022-08-24 LAB — B-HCG UR QL: NEGATIVE

## 2022-08-24 PROCEDURE — 0UDB7ZX EXTRACTION OF ENDOMETRIUM, VIA NATURAL OR ARTIFICIAL OPENING, DIAGNOSTIC: ICD-10-PCS | Performed by: OBSTETRICS & GYNECOLOGY

## 2022-08-24 PROCEDURE — 88305 TISSUE EXAM BY PATHOLOGIST: CPT | Performed by: OBSTETRICS & GYNECOLOGY

## 2022-08-24 PROCEDURE — 81025 URINE PREGNANCY TEST: CPT

## 2022-08-24 PROCEDURE — 0UB98ZX EXCISION OF UTERUS, VIA NATURAL OR ARTIFICIAL OPENING ENDOSCOPIC, DIAGNOSTIC: ICD-10-PCS | Performed by: OBSTETRICS & GYNECOLOGY

## 2022-08-24 RX ORDER — MIDAZOLAM HYDROCHLORIDE 1 MG/ML
INJECTION INTRAMUSCULAR; INTRAVENOUS AS NEEDED
Status: DISCONTINUED | OUTPATIENT
Start: 2022-08-24 | End: 2022-08-24 | Stop reason: SURG

## 2022-08-24 RX ORDER — NALOXONE HYDROCHLORIDE 0.4 MG/ML
80 INJECTION, SOLUTION INTRAMUSCULAR; INTRAVENOUS; SUBCUTANEOUS AS NEEDED
Status: DISCONTINUED | OUTPATIENT
Start: 2022-08-24 | End: 2022-08-24

## 2022-08-24 RX ORDER — MORPHINE SULFATE 4 MG/ML
2 INJECTION, SOLUTION INTRAMUSCULAR; INTRAVENOUS EVERY 10 MIN PRN
Status: DISCONTINUED | OUTPATIENT
Start: 2022-08-24 | End: 2022-08-24

## 2022-08-24 RX ORDER — FAMOTIDINE 20 MG/1
20 TABLET, FILM COATED ORAL ONCE
Status: COMPLETED | OUTPATIENT
Start: 2022-08-24 | End: 2022-08-24

## 2022-08-24 RX ORDER — METOCLOPRAMIDE 10 MG/1
10 TABLET ORAL ONCE
Status: COMPLETED | OUTPATIENT
Start: 2022-08-24 | End: 2022-08-24

## 2022-08-24 RX ORDER — SODIUM CHLORIDE, SODIUM LACTATE, POTASSIUM CHLORIDE, CALCIUM CHLORIDE 600; 310; 30; 20 MG/100ML; MG/100ML; MG/100ML; MG/100ML
INJECTION, SOLUTION INTRAVENOUS CONTINUOUS
Status: DISCONTINUED | OUTPATIENT
Start: 2022-08-24 | End: 2022-08-24

## 2022-08-24 RX ORDER — HYDROMORPHONE HYDROCHLORIDE 1 MG/ML
0.4 INJECTION, SOLUTION INTRAMUSCULAR; INTRAVENOUS; SUBCUTANEOUS EVERY 5 MIN PRN
Status: DISCONTINUED | OUTPATIENT
Start: 2022-08-24 | End: 2022-08-24

## 2022-08-24 RX ORDER — HYDROMORPHONE HYDROCHLORIDE 1 MG/ML
0.6 INJECTION, SOLUTION INTRAMUSCULAR; INTRAVENOUS; SUBCUTANEOUS EVERY 5 MIN PRN
Status: DISCONTINUED | OUTPATIENT
Start: 2022-08-24 | End: 2022-08-24

## 2022-08-24 RX ORDER — EPHEDRINE SULFATE 50 MG/ML
INJECTION INTRAVENOUS AS NEEDED
Status: DISCONTINUED | OUTPATIENT
Start: 2022-08-24 | End: 2022-08-24 | Stop reason: SURG

## 2022-08-24 RX ORDER — DEXAMETHASONE SODIUM PHOSPHATE 4 MG/ML
VIAL (ML) INJECTION AS NEEDED
Status: DISCONTINUED | OUTPATIENT
Start: 2022-08-24 | End: 2022-08-24 | Stop reason: SURG

## 2022-08-24 RX ORDER — ALPRAZOLAM 0.5 MG/1
0.5 TABLET ORAL NIGHTLY PRN
COMMUNITY

## 2022-08-24 RX ORDER — HYDROMORPHONE HYDROCHLORIDE 1 MG/ML
0.2 INJECTION, SOLUTION INTRAMUSCULAR; INTRAVENOUS; SUBCUTANEOUS EVERY 5 MIN PRN
Status: DISCONTINUED | OUTPATIENT
Start: 2022-08-24 | End: 2022-08-24

## 2022-08-24 RX ORDER — GLYCOPYRROLATE 0.2 MG/ML
INJECTION, SOLUTION INTRAMUSCULAR; INTRAVENOUS AS NEEDED
Status: DISCONTINUED | OUTPATIENT
Start: 2022-08-24 | End: 2022-08-24 | Stop reason: SURG

## 2022-08-24 RX ORDER — ONDANSETRON 2 MG/ML
INJECTION INTRAMUSCULAR; INTRAVENOUS AS NEEDED
Status: DISCONTINUED | OUTPATIENT
Start: 2022-08-24 | End: 2022-08-24 | Stop reason: SURG

## 2022-08-24 RX ORDER — ACETAMINOPHEN 500 MG
1000 TABLET ORAL ONCE
Status: COMPLETED | OUTPATIENT
Start: 2022-08-24 | End: 2022-08-24

## 2022-08-24 RX ORDER — MORPHINE SULFATE 4 MG/ML
4 INJECTION, SOLUTION INTRAMUSCULAR; INTRAVENOUS EVERY 10 MIN PRN
Status: DISCONTINUED | OUTPATIENT
Start: 2022-08-24 | End: 2022-08-24

## 2022-08-24 RX ORDER — IBUPROFEN 600 MG/1
600 TABLET ORAL EVERY 6 HOURS PRN
Qty: 30 TABLET | Refills: 0 | Status: SHIPPED | OUTPATIENT
Start: 2022-08-24 | End: 2022-09-01

## 2022-08-24 RX ORDER — KETOROLAC TROMETHAMINE 30 MG/ML
INJECTION, SOLUTION INTRAMUSCULAR; INTRAVENOUS AS NEEDED
Status: DISCONTINUED | OUTPATIENT
Start: 2022-08-24 | End: 2022-08-24 | Stop reason: SURG

## 2022-08-24 RX ORDER — LIDOCAINE HYDROCHLORIDE 10 MG/ML
INJECTION, SOLUTION EPIDURAL; INFILTRATION; INTRACAUDAL; PERINEURAL AS NEEDED
Status: DISCONTINUED | OUTPATIENT
Start: 2022-08-24 | End: 2022-08-24 | Stop reason: SURG

## 2022-08-24 RX ORDER — MORPHINE SULFATE 10 MG/ML
6 INJECTION, SOLUTION INTRAMUSCULAR; INTRAVENOUS EVERY 10 MIN PRN
Status: DISCONTINUED | OUTPATIENT
Start: 2022-08-24 | End: 2022-08-24

## 2022-08-24 RX ADMIN — KETOROLAC TROMETHAMINE 30 MG: 30 INJECTION, SOLUTION INTRAMUSCULAR; INTRAVENOUS at 14:13:00

## 2022-08-24 RX ADMIN — SODIUM CHLORIDE, SODIUM LACTATE, POTASSIUM CHLORIDE, CALCIUM CHLORIDE: 600; 310; 30; 20 INJECTION, SOLUTION INTRAVENOUS at 13:22:00

## 2022-08-24 RX ADMIN — EPHEDRINE SULFATE 5 MG: 50 INJECTION INTRAVENOUS at 13:43:00

## 2022-08-24 RX ADMIN — MIDAZOLAM HYDROCHLORIDE 2 MG: 1 INJECTION INTRAMUSCULAR; INTRAVENOUS at 13:25:00

## 2022-08-24 RX ADMIN — ONDANSETRON 4 MG: 2 INJECTION INTRAMUSCULAR; INTRAVENOUS at 13:30:00

## 2022-08-24 RX ADMIN — LIDOCAINE HYDROCHLORIDE 50 MG: 10 INJECTION, SOLUTION EPIDURAL; INFILTRATION; INTRACAUDAL; PERINEURAL at 13:25:00

## 2022-08-24 RX ADMIN — DEXAMETHASONE SODIUM PHOSPHATE 4 MG: 4 MG/ML VIAL (ML) INJECTION at 13:32:00

## 2022-08-24 RX ADMIN — GLYCOPYRROLATE 0.2 MG: 0.2 INJECTION, SOLUTION INTRAMUSCULAR; INTRAVENOUS at 13:47:00

## 2022-08-24 RX ADMIN — EPHEDRINE SULFATE 5 MG: 50 INJECTION INTRAVENOUS at 13:46:00

## 2022-08-24 NOTE — ANESTHESIA PROCEDURE NOTES
Airway  Date/Time: 8/24/2022 1:26 PM  Urgency: Elective    Airway not difficult    General Information and Staff    Patient location during procedure: OR  Anesthesiologist: Sandy Zapata MD  Performed: anesthesiologist     Indications and Patient Condition  Indications for airway management: anesthesia  Spontaneous ventilation: present  Sedation level: deep  Preoxygenated: yes  Patient position: sniffing  Mask difficulty assessment: 1 - vent by mask    Final Airway Details  Final airway type: supraglottic airway      Successful airway: classic  Size 4      Number of attempts at approach: 1

## 2022-08-24 NOTE — BRIEF OP NOTE
Pre-Operative Diagnosis: Thickened endometrium [R93.89]  Abnormal perimenopausal bleeding [N92.4]  1. Thickened endometrium  2. Abnormal perimenopausal bleeding   Post-Operative Diagnosis: Thickened endometrium [R93.89]Abnormal perimenopausal bleeding [N92.4]   1. Thickened endometrium  2. Abnormal perimenopausal bleeding   3. Endometrial polyp  4. Subserosal uterine fibroids   Procedure Performed:   Hysteroscopy Dilation and Curettage with myosure    Surgeon(s) and Role:     * Erika Coleman MD - Primary    Surgical Findings: Normal cervix. Endometrial polyp noted. Multiple subserosal fibroids noted.      Specimen: Uterine curettings done with MyoSure and sharp curette     Estimated Blood Loss: 40 ml    Dictation Number:  [id number]    Wade Nath MD, MD  8/24/2022  3:02 PM

## 2022-08-24 NOTE — ANESTHESIA POSTPROCEDURE EVALUATION
Patient: Silvestre Garcia    Procedure Summary     Date: 08/24/22 Room / Location: [address] [place] [address] MAIN OR 17 / [address] [place] [address] MAIN OR    Anesthesia Start: 3346 Anesthesia Stop:     Procedure: Hysteroscopy Dilation and Curettage with myosure (N/A Vagina ) Diagnosis:        Thickened endometrium      Abnormal perimenopausal bleeding      (Thickened endometrium [R93.89]Abnormal perimenopausal bleeding [N92.4])    Surgeons: Phoebe Sanchez MD Anesthesiologist: Kelsey Chua MD    Anesthesia Type: general ASA Status: 2          Anesthesia Type: general    Vitals Value Taken Time   /63 08/24/22 1426   Temp 97.2 08/24/22 1426   Pulse 70 08/24/22 1426   Resp 13 08/24/22 1426   SpO2 100 08/24/22 1426       [facility] AN Post Evaluation:   Patient Evaluated in PACU  Patient Participation: complete - patient participated  Level of Consciousness: awake and alert  Pain Score: 0  Pain Management: adequate  Airway Patency:patent  Dental exam unchanged from preop  Yes    Cardiovascular Status: acceptable  Respiratory Status: acceptable  Postoperative Hydration acceptable      [address], [place]  8/24/2022 2:26 PM

## 2022-08-25 NOTE — OPERATIVE REPORT
[facility]    PATIENT'S NAME: [email or web address]   ATTENDING PHYSICIAN: Tori Ferrer MD   OPERATING PHYSICIAN: Tori Ferrer MD   PATIENT ACCOUNT#:   [de-identified]    LOCATION:  [place] [address]  MEDICAL RECORD #:   [id number]       YOB: 1975  ADMISSION DATE:       08/24/2022      OPERATION DATE:  08/24/2022    OPERATIVE REPORT    PREOPERATIVE DIAGNOSIS:  1. Thickened endometrium. 2.   Abnormal perimenopausal bleeding. POSTOPERATIVE DIAGNOSIS:  1. Thickened endometrium. 2.   Abnormal perimenopausal bleeding. 3.   Endometrial polyp. 4.   Subserosal uterine fibroids. PROCEDURE:  Hysteroscopy with polypectomy and dilatation and curettage with MyoSure and sharp curette. ANESTHESIA:  General endotracheal.    INTRAVENOUS FLUIDS:  400 mL. ESTIMATED BLOOD LOSS:  40 mL. OPERATIVE TECHNIQUE:  Informed consent was obtained. The patient was prepped and draped in usual sterile fashion in dorsal lithotomy position. A speculum was placed in the vagina and the cervix was visualized. It was grasped with single-tooth tenaculum. The cervix was easily dilated. A hysteroscope was then placed into the uterine cavity with the help of hydrodilation. I immediately saw an endometrial polyp within the lower uterine segment entering into the cervical canal.  Within the uterine cavity, there were multiple what appeared to be submucosal fibroids versus large polyps. There was difficulty visualizing much in the endometrial cavity due to these findings. At this time, the MyoSure device was used. We were able to remove the majority of the endometrial polyp. We began morcellating along the uterine wall with the submucosal fibroids as well as large intrauterine mass consistent with fibroid versus large polyp. After extensive morcellation and without making significant progress on the larger mass it is deemed that we had obtained enough tissue for  A diagnostic result from the tissue. The hysteroscope was removed. I attempted to do a sharp curette throughout the uterine cavity to obtain more tissue. All of this tissue was sent together to Pathology. Hysteroscope was then replaced and there was no evidence of any significant bleeding noted. The procedure was deemed to be complete, all instruments were removed. The specimen was sent to Pathology. The patient was extubated, awoken, and transferred to the recovery room in good condition.     Dictated By Reny Neal MD  d: 08/24/2022 15:08:28  t: 08/24/2022 21:28:03  Job [id number]  JJF/

## 2022-08-29 ENCOUNTER — TELEPHONE (OUTPATIENT)
Dept: OBGYN CLINIC | Facility: CLINIC | Age: 47
End: 2022-08-29

## 2022-08-29 ENCOUNTER — PATIENT MESSAGE (OUTPATIENT)
Dept: OBGYN CLINIC | Facility: CLINIC | Age: 47
End: 2022-08-29

## 2022-08-29 NOTE — TELEPHONE ENCOUNTER
Pt scheduled for video visit next month, per pt she needed to have visit 2 weeks after procedure. No further questions.

## 2022-08-29 NOTE — TELEPHONE ENCOUNTER
Patient may either have an in office visit or you can add her on/double book her at the end of my video visits on 9/1/2022.

## 2022-09-15 ENCOUNTER — TELEMEDICINE (OUTPATIENT)
Dept: OBGYN CLINIC | Facility: CLINIC | Age: 47
End: 2022-09-15
Payer: MEDICARE

## 2022-09-15 DIAGNOSIS — Z98.890 POST-OPERATIVE STATE: Primary | ICD-10-CM

## 2022-09-15 DIAGNOSIS — N84.0 ENDOMETRIAL POLYP: ICD-10-CM

## 2022-09-15 DIAGNOSIS — D25.0 FIBROIDS, SUBMUCOSAL: ICD-10-CM

## 2022-09-15 PROCEDURE — 99024 POSTOP FOLLOW-UP VISIT: CPT | Performed by: OBSTETRICS & GYNECOLOGY

## 2022-09-23 ENCOUNTER — LAB REQUISITION (OUTPATIENT)
Dept: SURGERY | Age: 47
End: 2022-09-23

## 2022-09-23 DIAGNOSIS — Z01.818 PREOP EXAMINATION: ICD-10-CM

## 2022-09-24 LAB — SARS-COV-2 RNA RESP QL NAA+PROBE: NOT DETECTED

## 2022-09-26 ENCOUNTER — LAB REQUISITION (OUTPATIENT)
Dept: SURGERY | Age: 47
End: 2022-09-26

## 2022-09-26 ENCOUNTER — TELEPHONE (OUTPATIENT)
Dept: SURGERY | Age: 47
End: 2022-09-26

## 2022-09-26 ENCOUNTER — SURGERY CENTER DOCUMENTATION (OUTPATIENT)
Dept: SURGERY | Age: 47
End: 2022-09-26

## 2022-09-26 DIAGNOSIS — Z01.89 NORMAL ESOPHAGOGASTRODUODENOSCOPY (EGD): ICD-10-CM

## 2022-09-26 DIAGNOSIS — R10.9 ABDOMINAL PAIN, UNSPECIFIED ABDOMINAL LOCATION: ICD-10-CM

## 2022-09-26 DIAGNOSIS — K57.30 DIVERTICULA OF COLON: ICD-10-CM

## 2022-09-26 DIAGNOSIS — R14.0 GASEOUS ABDOMINAL DISTENTION: ICD-10-CM

## 2022-09-26 DIAGNOSIS — R11.0 NAUSEA: ICD-10-CM

## 2022-09-26 DIAGNOSIS — K62.5 HEMORRHAGE OF RECTUM AND ANUS: ICD-10-CM

## 2022-09-26 DIAGNOSIS — R63.4 WEIGHT LOSS: Primary | ICD-10-CM

## 2022-09-26 DIAGNOSIS — R14.0 ABDOMINAL DISTENSION: ICD-10-CM

## 2022-09-26 DIAGNOSIS — R10.33 PERIUMBILICAL ABDOMINAL PAIN: ICD-10-CM

## 2022-09-26 PROCEDURE — 88305 TISSUE EXAM BY PATHOLOGIST: CPT | Performed by: INTERNAL MEDICINE

## 2022-09-26 PROCEDURE — 88312 SPECIAL STAINS GROUP 1: CPT | Performed by: INTERNAL MEDICINE

## 2022-09-26 NOTE — TELEPHONE ENCOUNTER
CT AP ordered for ongoing ab sx, weight loss. EGD/CLN unremarkable.     GI Staff:   Please mychart patient instructions on how to get ct scan done as ordered thx

## 2022-10-03 ENCOUNTER — TELEPHONE (OUTPATIENT)
Dept: GASTROENTEROLOGY | Facility: CLINIC | Age: 47
End: 2022-10-03

## 2022-10-03 NOTE — TELEPHONE ENCOUNTER
I mailed out colonoscopy/EGD results letter to pt  Updated health maintenance  Entered into 5 yr CLN recall  Recall colon in 5 years per.  Colon done 9/26/2022  EGD done 9/26/2022- No EGD Recall Entered    Emre Segura MD  [facility] Clinical Staff  GI staff: please place recall in for colonoscopy in 10 years

## 2022-10-04 ENCOUNTER — HOSPITAL ENCOUNTER (OUTPATIENT)
Dept: CT IMAGING | Facility: HOSPITAL | Age: 47
Discharge: HOME | End: 2022-10-04
Attending: INTERNAL MEDICINE
Payer: MEDICARE

## 2022-10-04 ENCOUNTER — TELEPHONE (OUTPATIENT)
Dept: GASTROENTEROLOGY | Facility: CLINIC | Age: 47
End: 2022-10-04

## 2022-10-04 DIAGNOSIS — R10.33 PERIUMBILICAL ABDOMINAL PAIN: ICD-10-CM

## 2022-10-04 DIAGNOSIS — R14.0 ABDOMINAL DISTENSION: ICD-10-CM

## 2022-10-04 DIAGNOSIS — R63.4 WEIGHT LOSS: ICD-10-CM

## 2022-10-04 LAB
CREAT BLD-MCNC: 0.8 MG/DL
GFR SERPLBLD BASED ON 1.73 SQ M-ARVRAT: 91 ML/MIN/1.73M2 (ref 60–?)

## 2022-10-04 PROCEDURE — 74177 CT ABD & PELVIS W/CONTRAST: CPT | Performed by: INTERNAL MEDICINE

## 2022-10-04 PROCEDURE — 82565 ASSAY OF CREATININE: CPT

## 2022-10-13 ENCOUNTER — PATIENT MESSAGE (OUTPATIENT)
Dept: PODIATRY CLINIC | Facility: CLINIC | Age: 47
End: 2022-10-13

## 2022-10-14 ENCOUNTER — OFFICE VISIT (OUTPATIENT)
Dept: PODIATRY CLINIC | Facility: CLINIC | Age: 47
End: 2022-10-14
Payer: MEDICARE

## 2022-10-14 VITALS — HEART RATE: 63 BPM | DIASTOLIC BLOOD PRESSURE: 69 MMHG | SYSTOLIC BLOOD PRESSURE: 101 MMHG

## 2022-10-14 DIAGNOSIS — D36.10 NEUROMA: Primary | ICD-10-CM

## 2022-10-14 RX ORDER — METHYLPREDNISOLONE ACETATE 80 MG/ML
20 INJECTION, SUSPENSION INTRA-ARTICULAR; INTRALESIONAL; INTRAMUSCULAR; SOFT TISSUE ONCE
Status: COMPLETED | OUTPATIENT
Start: 2022-10-14 | End: 2022-10-14

## 2022-10-14 RX ADMIN — METHYLPREDNISOLONE ACETATE 20 MG: 80 INJECTION, SUSPENSION INTRA-ARTICULAR; INTRALESIONAL; INTRAMUSCULAR; SOFT TISSUE at 13:30:00

## 2022-12-12 ENCOUNTER — OFFICE VISIT (OUTPATIENT)
Dept: SURGERY | Facility: CLINIC | Age: 47
End: 2022-12-12
Payer: MEDICARE

## 2022-12-12 DIAGNOSIS — G56.03 BILATERAL CARPAL TUNNEL SYNDROME: ICD-10-CM

## 2022-12-12 DIAGNOSIS — M79.641 PAIN IN RIGHT HAND: Primary | ICD-10-CM

## 2022-12-12 RX ORDER — INDOMETHACIN 25 MG/1
25 CAPSULE ORAL
Qty: 42 CAPSULE | Refills: 0 | Status: SHIPPED | OUTPATIENT
Start: 2022-12-12 | End: 2022-12-26

## 2022-12-12 NOTE — PROGRESS NOTES
Per Dr Cristiana Lujan pt given bilateral medium cock up splints,fit well,pt instructed to wear night only. Also instructed to  indocin po at preferred pharmacy take as directed and complete entire dose. If symptoms continue see Physiatry. Verbalized understanding.

## 2022-12-27 ENCOUNTER — OFFICE VISIT (OUTPATIENT)
Dept: PODIATRY CLINIC | Facility: CLINIC | Age: 47
End: 2022-12-27
Payer: MEDICARE

## 2022-12-27 VITALS — DIASTOLIC BLOOD PRESSURE: 72 MMHG | HEART RATE: 67 BPM | SYSTOLIC BLOOD PRESSURE: 105 MMHG

## 2022-12-27 DIAGNOSIS — D36.10 NEUROMA: Primary | ICD-10-CM

## 2022-12-27 PROCEDURE — 64455 NJX AA&/STRD PLTR COM DG NRV: CPT | Performed by: PODIATRIST

## 2022-12-27 PROCEDURE — 99213 OFFICE O/P EST LOW 20 MIN: CPT | Performed by: PODIATRIST

## 2022-12-27 RX ORDER — METHYLPREDNISOLONE ACETATE 80 MG/ML
20 INJECTION, SUSPENSION INTRA-ARTICULAR; INTRALESIONAL; INTRAMUSCULAR; SOFT TISSUE ONCE
Status: COMPLETED | OUTPATIENT
Start: 2022-12-27 | End: 2022-12-27

## 2022-12-27 RX ADMIN — METHYLPREDNISOLONE ACETATE 20 MG: 80 INJECTION, SUSPENSION INTRA-ARTICULAR; INTRALESIONAL; INTRAMUSCULAR; SOFT TISSUE at 14:19:00

## 2022-12-27 NOTE — PROGRESS NOTES
Per verbal order from Dr. Della Schirmer, draw up 0.5ml of 0.5% Marcaine and 20 mg of Depo-Medrol for cortisone injection to Left [address]  Patient provided education handout for cortisone injection.

## 2023-01-10 ENCOUNTER — APPOINTMENT (OUTPATIENT)
Dept: GENERAL RADIOLOGY | Facility: HOSPITAL | Age: 48
End: 2023-01-10
Attending: EMERGENCY MEDICINE
Payer: MEDICARE

## 2023-01-10 ENCOUNTER — HOSPITAL ENCOUNTER (EMERGENCY)
Facility: HOSPITAL | Age: 48
Discharge: HOME | End: 2023-01-10
Attending: EMERGENCY MEDICINE
Payer: MEDICARE

## 2023-01-10 VITALS
TEMPERATURE: 98 F | HEART RATE: 67 BPM | DIASTOLIC BLOOD PRESSURE: 73 MMHG | BODY MASS INDEX: 32 KG/M2 | OXYGEN SATURATION: 98 % | WEIGHT: 199.06 LBS | RESPIRATION RATE: 20 BRPM | SYSTOLIC BLOOD PRESSURE: 112 MMHG

## 2023-01-10 DIAGNOSIS — M54.9 BACK PAIN WITHOUT RADIATION: Primary | ICD-10-CM

## 2023-01-10 LAB
BILIRUB UR QL: NEGATIVE
CLARITY UR: CLEAR
COLOR UR: YELLOW
GLUCOSE UR-MCNC: NEGATIVE MG/DL
KETONES UR-MCNC: NEGATIVE MG/DL
LEUKOCYTE ESTERASE UR QL STRIP.AUTO: NEGATIVE
NITRITE UR QL STRIP.AUTO: NEGATIVE
PH UR: 6 [PH] (ref 5–8)
PROT UR-MCNC: NEGATIVE MG/DL
SP GR UR STRIP: >=1.03 (ref 1–1.03)
UROBILINOGEN UR STRIP-ACNC: 1

## 2023-01-10 PROCEDURE — 96372 THER/PROPH/DIAG INJ SC/IM: CPT

## 2023-01-10 PROCEDURE — 99284 EMERGENCY DEPT VISIT MOD MDM: CPT

## 2023-01-10 PROCEDURE — 81015 MICROSCOPIC EXAM OF URINE: CPT | Performed by: EMERGENCY MEDICINE

## 2023-01-10 PROCEDURE — 81001 URINALYSIS AUTO W/SCOPE: CPT | Performed by: EMERGENCY MEDICINE

## 2023-01-10 PROCEDURE — 73502 X-RAY EXAM HIP UNI 2-3 VIEWS: CPT | Performed by: EMERGENCY MEDICINE

## 2023-01-10 RX ORDER — MORPHINE SULFATE 4 MG/ML
4 INJECTION, SOLUTION INTRAMUSCULAR; INTRAVENOUS ONCE
Status: COMPLETED | OUTPATIENT
Start: 2023-01-10 | End: 2023-01-10

## 2023-01-10 RX ORDER — HYDROCODONE BITARTRATE AND ACETAMINOPHEN 5; 325 MG/1; MG/1
1 TABLET ORAL EVERY 6 HOURS PRN
Qty: 10 TABLET | Refills: 0 | Status: SHIPPED | OUTPATIENT
Start: 2023-01-10 | End: 2023-01-17

## 2023-01-11 NOTE — ED INITIAL ASSESSMENT (HPI)
The patient reports one day of atraumatic right lower back pain that radiates to her right hip. She adds that her left lower back now hurts also. She denies any urinary symptoms.

## 2023-07-06 ENCOUNTER — MED REC SCAN ONLY (OUTPATIENT)
Dept: PHYSICAL MEDICINE AND REHAB | Facility: CLINIC | Age: 48
End: 2023-07-06

## 2023-07-06 ENCOUNTER — PROCEDURE VISIT (OUTPATIENT)
Dept: PHYSICAL MEDICINE AND REHAB | Facility: CLINIC | Age: 48
End: 2023-07-06
Payer: MEDICARE

## 2023-07-06 DIAGNOSIS — G56.01 RIGHT CARPAL TUNNEL SYNDROME: Primary | ICD-10-CM

## 2023-07-14 ENCOUNTER — OFFICE VISIT (OUTPATIENT)
Dept: SURGERY | Facility: CLINIC | Age: 48
End: 2023-07-14
Payer: MEDICARE

## 2023-07-14 VITALS
SYSTOLIC BLOOD PRESSURE: 102 MMHG | BODY MASS INDEX: 36.21 KG/M2 | HEIGHT: 65.5 IN | WEIGHT: 220 LBS | OXYGEN SATURATION: 97 % | DIASTOLIC BLOOD PRESSURE: 70 MMHG | HEART RATE: 57 BPM

## 2023-07-14 DIAGNOSIS — E78.2 MIXED HYPERLIPIDEMIA: Primary | ICD-10-CM

## 2023-07-14 DIAGNOSIS — R73.03 PREDIABETES: ICD-10-CM

## 2023-07-14 DIAGNOSIS — D50.9 IRON DEFICIENCY ANEMIA, UNSPECIFIED IRON DEFICIENCY ANEMIA TYPE: ICD-10-CM

## 2023-07-14 DIAGNOSIS — E66.9 OBESITY (BMI 30-39.9): ICD-10-CM

## 2023-07-14 DIAGNOSIS — E55.9 VITAMIN D DEFICIENCY: ICD-10-CM

## 2023-07-14 PROCEDURE — 99204 OFFICE O/P NEW MOD 45 MIN: CPT | Performed by: NURSE PRACTITIONER

## 2023-07-14 RX ORDER — VALACYCLOVIR HYDROCHLORIDE 500 MG/1
TABLET, FILM COATED ORAL
COMMUNITY

## 2023-07-14 RX ORDER — SEMAGLUTIDE 0.25 MG/.5ML
0.25 INJECTION, SOLUTION SUBCUTANEOUS WEEKLY
Qty: 4 EACH | Refills: 2 | Status: SHIPPED | OUTPATIENT
Start: 2023-07-14

## 2023-07-14 RX ORDER — IBUPROFEN 600 MG/1
600 TABLET ORAL EVERY 6 HOURS PRN
COMMUNITY
Start: 2023-01-24

## 2023-07-14 RX ORDER — TRAZODONE HYDROCHLORIDE 50 MG/1
TABLET ORAL
COMMUNITY

## 2023-07-14 NOTE — PATIENT INSTRUCTIONS
[facility] schedule:    0.25 mg/week x 4 weeks  0.5 mg/week x 4 weeks  1 mg/week x 4 weeks  1.7 mg/week x 4 weeks  2.4 mg/week thereafter    80 grams protein/day. 30-40 grams fiber/day. 3 PM handful nuts. [address] PB. Continue IF. I recommend a whole food, plant powered diet with low glycemic index:     Aim for 2-3 meals a day and 1-2 snacks as needed. Aim for a protein + produce at each meal time. Breakfast/lunch ideas:  1. Fruit and nuts/seeds. 2. Eggs scrambled with vegetables. 3. Oatmeal (stovetop), cinnamon, 2 tbsp flaxseed, berries, nuts. 4. Protein shake + fruit. (1 scoop with water/ice). [employer] [facility] [employer], [employer], [place], and [place] are good brands. Snacks:  Raw vegetables and hummus, apples and peanut butter, nuts, seeds, fruit, pecans drizzled with organic honey. Use the Healthy Plate method for lunch and dinner:  1/2 right side of plate non-starchy vegetables. Bottom left 1/4 plate protein. Top left 1/4 starch as desired. Aim for a total of:  2 fruits a day (avocado, tomato, citrus/oranges, apples, berries)  1/2 cup or medium size    4 non-starchy vegetables (handful) (greens, peppers, onions, garlic, broccoli, cauliflower, etc.)    0-2 starches (oatmeal, sweet potato, carrots, brown rice, etc). 3 protein per day (fish, seafood, meat, or plants: salmon, nuts, seeds, shrimp, chicken, turkey, beans, lentils, chickpeas, etc).     2 healthy fats (avocado, avocado oil, olives, olive oil, salmon, nuts, seeds)

## 2023-07-24 ENCOUNTER — OFFICE VISIT (OUTPATIENT)
Dept: PODIATRY CLINIC | Facility: CLINIC | Age: 48
End: 2023-07-24

## 2023-07-24 VITALS — SYSTOLIC BLOOD PRESSURE: 105 MMHG | HEART RATE: 58 BPM | DIASTOLIC BLOOD PRESSURE: 71 MMHG

## 2023-07-24 DIAGNOSIS — M79.672 LEFT FOOT PAIN: ICD-10-CM

## 2023-07-24 DIAGNOSIS — G57.62 PLANTAR NEUROMA OF LEFT FOOT: Primary | ICD-10-CM

## 2023-07-24 RX ORDER — TRIAMCINOLONE ACETONIDE 40 MG/ML
40 INJECTION, SUSPENSION INTRA-ARTICULAR; INTRAMUSCULAR ONCE
Status: COMPLETED | OUTPATIENT
Start: 2023-07-24 | End: 2023-07-24

## 2023-07-24 RX ADMIN — TRIAMCINOLONE ACETONIDE 40 MG: 40 INJECTION, SUSPENSION INTRA-ARTICULAR; INTRAMUSCULAR at 16:45:00

## 2023-07-24 NOTE — PROGRESS NOTES
[facility] Podiatry  Progress Note    Andrés Rosenbaum is a 50year old female. Patient presents with: Foot Pain: Bilateral foot pain. Pt of Vero Cooper last seen 12/2722- received cortisone injection to left foot 12/27/22. throbbing pain at ball of foot   L-7/10 R- 5/10         HPI:     This is a pleasant female that presents to clinic today due to left foot neuroma pain. She did receive a steroid injection in 2022 by Dr. Ericka Mauricio which helped for a long time but the pain has resurfaced. She denies any injury. Allergies: Patient has no known allergies. Current Outpatient Medications   Medication Sig Dispense Refill    traZODone 50 MG Oral Tab TAKE 1/2 TABLET BY MOUTH NIGHTLY AS NEEDED FOR SLEEP/AGITATION. MAY INCREASE TO A FULL TABLET IF TOLERATED      valACYclovir 500 MG Oral Tab       ibuprofen 600 MG Oral Tab Take 1 tablet (600 mg total) by mouth every 6 (six) hours as needed for Pain.      semaglutide-weight management (WEGOVY) 0.25 MG/0.5ML Subcutaneous Solution Auto-injector Inject 0.5 mL (0.25 mg total) into the skin once a week. 4 each 2    ALPRAZolam 0.5 MG Oral Tab Take 1 tablet (0.5 mg total) by mouth nightly as needed. 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 GENTLY BLOW NOSE PRIOR TO USE        Past Medical History:   Diagnosis Date    Anemia     Anxiety     Colon polyp 2019    None in 2022. REpeat CLN in 2032.     Herpes simplex type 1 infection     Menorrhagia     Morbid obesity with BMI of 40.0-44.9, adult [facility])     Obesity       Past Surgical History:   Procedure Laterality Date    COLONOSCOPY N/A 9/24/2019    Procedure: COLONOSCOPY;  Surgeon: Keya Tilley MD;  Location: [facility] ENDOSCOPY    D & C      x 2    SALPINGECTOMY Bilateral 03/2017    TUBAL LIGATION        Family History   Problem Relation Age of Onset    Diabetes Mother     Hypertension Mother     Diabetes Sister     Diabetes Father Other (Other) Father       Social History    Socioeconomic History      Marital status: Single    Tobacco Use      Smoking status: Never      Smokeless tobacco: Never    Vaping Use      Vaping Use: Never used    Substance and Sexual Activity      Alcohol use: Yes        Comment: rarely      Drug use: No      Sexual activity: Yes        Partners: Male          REVIEW OF SYSTEMS:   Denies nausea, fever, chills  No calf pain  No other muscle or joint aches  Denies chest pain or shortness of breath. EXAM:   LMP 03/12/2022 (Within Days)     Constitution: Well-developed and well-nourished. Gait appears normal. No apparent distress. Alert and oriented to person, place, and time. Integument: There are no varicosities. Skin appears moist, warm, and supple with positive hair growth. There are no color changes. No open lesions. No macerations, No Hyperkeratotic lesions. Vascular examination: Dorsalis pedis and posterior tibial pulses are strong bilaterally with capillary filling time less than 3 seconds to all digits. There is no peripheral edema. .  Neurological Sensorium: Grossly intact to sharp/dull. Vibratory: Intact. Musculoskeletal:   5/5 pedal muscle strength b/l  Pain on compression to left 2nd inner metatarsal spaces with positive manuela's click indicative of neuroma. Decreased left ankle DF with knee extended approx 0 degrees       LABS & IMAGING:     Lab Results   Component Value Date    GLU 79 04/20/2022    BUN 14 04/20/2022    CREATSERUM 0.86 04/20/2022    BUNCREA 16.3 04/20/2022    ANIONGAP 3 04/20/2022    GFRAA 94 04/20/2022    GFRNAA 81 04/20/2022    CA 9.3 04/20/2022     04/20/2022    K 4.2 04/20/2022     04/20/2022    CO2 29.0 04/20/2022    OSMOCALC 283 04/20/2022        Lab Results   Component Value Date    A1C 5.8 07/21/2017        No results found.      ASSESSMENT AND PLAN:   Diagnoses and all orders for this visit:    Plantar neuroma of left foot    Left foot pain        Plan: Discussed both the pathology and etiology of a neuroma condition. Discussed with patient regarding the biomechanical factors that cause or aggravate the neuroma condition. Discussed various treatment options with patient which consists of continued observation vs surgical intervention or neuroma injection   Recommended Achilles tendon stretching 5 times/day    Pt would like to proceed with 1st Left plantar neuroma injection for 2023  Procedure 69383: Neuroma Injection  Prepped area with alcohol. The 2nd interspace of left foot  was injected through the dorsal aspect using 1cc 40mg/ml Kenalog, 1cc 0.5% marcaine plain. Pt tolerated the procedure well. There were no complications. Patient admitted partial relief post injection. Pt will get OTC powerstep inserts    RTC 6 weeks, if still painful will get left foot xrays and discuss PT. May also consider US as well. No follow-ups on file.     Juan Pelayo DPM  7/24/2023

## 2023-07-24 NOTE — PROGRESS NOTES
Per verbal order from Dr. Adriana Jeffery, draw up 1ml of 0.5% Marcaine and 1ml of Kenalog 40 for cortisone injection to left foot. Brooks Cota RN  Patient provided education handout for cortisone injection.

## 2023-09-13 ENCOUNTER — TELEMEDICINE (OUTPATIENT)
Dept: SURGERY | Facility: CLINIC | Age: 48
End: 2023-09-13
Payer: MEDICARE

## 2023-09-13 VITALS — BODY MASS INDEX: 37 KG/M2 | WEIGHT: 225 LBS

## 2023-09-13 DIAGNOSIS — R73.03 PREDIABETES: ICD-10-CM

## 2023-09-13 DIAGNOSIS — E66.9 OBESITY (BMI 30-39.9): ICD-10-CM

## 2023-09-13 DIAGNOSIS — D50.9 IRON DEFICIENCY ANEMIA, UNSPECIFIED IRON DEFICIENCY ANEMIA TYPE: ICD-10-CM

## 2023-09-13 DIAGNOSIS — E55.9 VITAMIN D DEFICIENCY: ICD-10-CM

## 2023-09-13 DIAGNOSIS — E78.2 MIXED HYPERLIPIDEMIA: Primary | ICD-10-CM

## 2023-09-13 PROCEDURE — 99214 OFFICE O/P EST MOD 30 MIN: CPT | Performed by: NURSE PRACTITIONER

## 2023-09-13 RX ORDER — PHENTERMINE AND TOPIRAMATE 3.75; 23 MG/1; MG/1
1 CAPSULE, EXTENDED RELEASE ORAL DAILY
Qty: 30 CAPSULE | Refills: 1 | Status: SHIPPED | OUTPATIENT
Start: 2023-09-13 | End: 2023-09-14

## 2023-09-14 ENCOUNTER — LAB ENCOUNTER (OUTPATIENT)
Dept: LAB | Facility: HOSPITAL | Age: 48
End: 2023-09-14
Attending: NURSE PRACTITIONER
Payer: MEDICARE

## 2023-09-14 DIAGNOSIS — R73.03 PREDIABETES: ICD-10-CM

## 2023-09-14 DIAGNOSIS — E55.9 VITAMIN D DEFICIENCY: ICD-10-CM

## 2023-09-14 DIAGNOSIS — E66.9 OBESITY (BMI 30-39.9): ICD-10-CM

## 2023-09-14 DIAGNOSIS — E78.2 MIXED HYPERLIPIDEMIA: ICD-10-CM

## 2023-09-14 DIAGNOSIS — E66.9 OBESITY (BMI 30-39.9): Primary | ICD-10-CM

## 2023-09-14 DIAGNOSIS — D50.9 IRON DEFICIENCY ANEMIA, UNSPECIFIED IRON DEFICIENCY ANEMIA TYPE: ICD-10-CM

## 2023-09-14 LAB
ALBUMIN SERPL-MCNC: 3.7 G/DL (ref 3.4–5)
ALBUMIN/GLOB SERPL: 0.9 {RATIO} (ref 1–2)
ALP LIVER SERPL-CCNC: 71 U/L
ALT SERPL-CCNC: 25 U/L
ANION GAP SERPL CALC-SCNC: 5 MMOL/L (ref 0–18)
AST SERPL-CCNC: 15 U/L (ref 15–37)
ATRIAL RATE: 48 BPM
BILIRUB SERPL-MCNC: 0.3 MG/DL (ref 0.1–2)
BUN BLD-MCNC: 13 MG/DL (ref 7–18)
BUN/CREAT SERPL: 16.5 (ref 10–20)
CALCIUM BLD-MCNC: 9.2 MG/DL (ref 8.5–10.1)
CHLORIDE SERPL-SCNC: 108 MMOL/L (ref 98–112)
CHOLEST SERPL-MCNC: 246 MG/DL (ref ?–200)
CO2 SERPL-SCNC: 29 MMOL/L (ref 21–32)
CREAT BLD-MCNC: 0.79 MG/DL
EGFRCR SERPLBLD CKD-EPI 2021: 92 ML/MIN/1.73M2 (ref 60–?)
EST. AVERAGE GLUCOSE BLD GHB EST-MCNC: 123 MG/DL (ref 68–126)
FASTING PATIENT LIPID ANSWER: YES
FASTING STATUS PATIENT QL REPORTED: YES
GLOBULIN PLAS-MCNC: 4 G/DL (ref 2.8–4.4)
GLUCOSE BLD-MCNC: 104 MG/DL (ref 70–99)
HBA1C MFR BLD: 5.9 % (ref ?–5.7)
HDLC SERPL-MCNC: 111 MG/DL (ref 40–59)
INSULIN SERPL-ACNC: 11.5 MU/L (ref 3–25)
LDLC SERPL CALC-MCNC: 125 MG/DL (ref ?–100)
NONHDLC SERPL-MCNC: 135 MG/DL (ref ?–130)
OSMOLALITY SERPL CALC.SUM OF ELEC: 294 MOSM/KG (ref 275–295)
P-R INTERVAL: 154 MS
POTASSIUM SERPL-SCNC: 4.1 MMOL/L (ref 3.5–5.1)
PROT SERPL-MCNC: 7.7 G/DL (ref 6.4–8.2)
Q-T INTERVAL: 422 MS
QRS DURATION: 82 MS
QTC CALCULATION (BEZET): 376 MS
R AXIS: 1 DEGREES
SODIUM SERPL-SCNC: 142 MMOL/L (ref 136–145)
T AXIS: 44 DEGREES
TRIGL SERPL-MCNC: 60 MG/DL (ref 30–149)
VENTRICULAR RATE: 48 BPM
VLDLC SERPL CALC-MCNC: 11 MG/DL (ref 0–30)

## 2023-09-14 PROCEDURE — 36415 COLL VENOUS BLD VENIPUNCTURE: CPT

## 2023-09-14 PROCEDURE — 80061 LIPID PANEL: CPT

## 2023-09-14 PROCEDURE — 83525 ASSAY OF INSULIN: CPT

## 2023-09-14 PROCEDURE — 93005 ELECTROCARDIOGRAM TRACING: CPT

## 2023-09-14 PROCEDURE — 93010 ELECTROCARDIOGRAM REPORT: CPT | Performed by: INTERNAL MEDICINE

## 2023-09-14 PROCEDURE — 83036 HEMOGLOBIN GLYCOSYLATED A1C: CPT

## 2023-09-14 PROCEDURE — 80053 COMPREHEN METABOLIC PANEL: CPT

## 2023-09-14 RX ORDER — PHENTERMINE AND TOPIRAMATE 7.5; 46 MG/1; MG/1
1 CAPSULE, EXTENDED RELEASE ORAL DAILY
Qty: 30 CAPSULE | Refills: 2 | Status: SHIPPED | OUTPATIENT
Start: 2023-09-14

## 2023-10-21 ENCOUNTER — APPOINTMENT (OUTPATIENT)
Dept: GENERAL RADIOLOGY | Facility: HOSPITAL | Age: 48
End: 2023-10-21
Payer: MEDICARE

## 2023-10-21 ENCOUNTER — HOSPITAL ENCOUNTER (EMERGENCY)
Facility: HOSPITAL | Age: 48
Discharge: LEFT BEFORE DISCHARGE | End: 2023-10-21
Payer: MEDICARE

## 2023-10-21 VITALS
TEMPERATURE: 98 F | OXYGEN SATURATION: 97 % | SYSTOLIC BLOOD PRESSURE: 106 MMHG | WEIGHT: 223 LBS | DIASTOLIC BLOOD PRESSURE: 71 MMHG | HEIGHT: 66 IN | HEART RATE: 60 BPM | BODY MASS INDEX: 35.84 KG/M2 | RESPIRATION RATE: 18 BRPM

## 2023-10-21 PROCEDURE — 73560 X-RAY EXAM OF KNEE 1 OR 2: CPT

## 2023-10-21 NOTE — ED INITIAL ASSESSMENT (HPI)
RIGHT KNEE PAIN AFTER TWISTING IT YESTERDAY.    HX  OF ARTHRITIS TO KNEE, GOT GEL INJECTIONS 3 MONTHS AGO  AMBULATORY

## 2023-10-21 NOTE — ED QUICK NOTES
Pt was called earlier today at 1500 after the left the ER without anyone knowing so that they can  their son she stated that she would be coming back later today, PT was then called again at 56 asking where they went and if they still wanted to be seen at the ER to which the PT stated \"I put ice on it and wrapped it so now it feels better\" I then told doctor alejandro and was instructed to take them off the ER board.

## 2023-11-21 ENCOUNTER — HOSPITAL ENCOUNTER (EMERGENCY)
Facility: HOSPITAL | Age: 48
Discharge: HOME | End: 2023-11-21
Attending: EMERGENCY MEDICINE
Payer: MEDICARE

## 2023-11-21 ENCOUNTER — APPOINTMENT (OUTPATIENT)
Dept: ULTRASOUND IMAGING | Facility: HOSPITAL | Age: 48
End: 2023-11-21
Attending: EMERGENCY MEDICINE
Payer: MEDICARE

## 2023-11-21 VITALS
SYSTOLIC BLOOD PRESSURE: 114 MMHG | TEMPERATURE: 98 F | OXYGEN SATURATION: 98 % | RESPIRATION RATE: 20 BRPM | HEART RATE: 61 BPM | DIASTOLIC BLOOD PRESSURE: 68 MMHG

## 2023-11-21 DIAGNOSIS — M79.604 PAIN OF RIGHT LOWER EXTREMITY: Primary | ICD-10-CM

## 2023-11-21 PROCEDURE — 93971 EXTREMITY STUDY: CPT | Performed by: EMERGENCY MEDICINE

## 2023-11-21 PROCEDURE — 99284 EMERGENCY DEPT VISIT MOD MDM: CPT

## 2023-11-21 RX ORDER — CYCLOBENZAPRINE HCL 10 MG
10 TABLET ORAL 3 TIMES DAILY PRN
Qty: 20 TABLET | Refills: 0 | Status: SHIPPED | OUTPATIENT
Start: 2023-11-21 | End: 2023-11-28

## 2024-01-09 ENCOUNTER — LAB ENCOUNTER (OUTPATIENT)
Dept: LAB | Facility: HOSPITAL | Age: 49
End: 2024-01-09
Attending: INTERNAL MEDICINE
Payer: MEDICARE

## 2024-01-09 DIAGNOSIS — E78.2 MIXED HYPERLIPIDEMIA: Primary | ICD-10-CM

## 2024-01-09 DIAGNOSIS — Z82.49 FAMILY HISTORY OF PREMATURE CORONARY ARTERY DISEASE: ICD-10-CM

## 2024-01-09 LAB
CHOLEST SERPL-MCNC: 173 MG/DL (ref ?–200)
FASTING PATIENT LIPID ANSWER: YES
HDLC SERPL-MCNC: 91 MG/DL (ref 40–59)
LDLC SERPL CALC-MCNC: 73 MG/DL (ref ?–100)
NONHDLC SERPL-MCNC: 82 MG/DL (ref ?–130)
TRIGL SERPL-MCNC: 44 MG/DL (ref 30–149)
VLDLC SERPL CALC-MCNC: 7 MG/DL (ref 0–30)

## 2024-01-09 PROCEDURE — 80061 LIPID PANEL: CPT

## 2024-01-09 PROCEDURE — 36415 COLL VENOUS BLD VENIPUNCTURE: CPT

## 2024-01-09 PROCEDURE — 83695 ASSAY OF LIPOPROTEIN(A): CPT

## 2024-01-10 LAB — LIPOPROTEIN (A): 28.9 NMOL/L

## 2024-01-15 ENCOUNTER — OFFICE VISIT (OUTPATIENT)
Dept: SURGERY | Facility: CLINIC | Age: 49
End: 2024-01-15
Payer: MEDICARE

## 2024-01-15 VITALS
OXYGEN SATURATION: 98 % | HEART RATE: 74 BPM | WEIGHT: 227 LBS | DIASTOLIC BLOOD PRESSURE: 70 MMHG | HEIGHT: 65.5 IN | BODY MASS INDEX: 37.37 KG/M2 | SYSTOLIC BLOOD PRESSURE: 120 MMHG

## 2024-01-15 DIAGNOSIS — E66.9 OBESITY (BMI 30-39.9): ICD-10-CM

## 2024-01-15 DIAGNOSIS — D50.9 IRON DEFICIENCY ANEMIA, UNSPECIFIED IRON DEFICIENCY ANEMIA TYPE: ICD-10-CM

## 2024-01-15 DIAGNOSIS — Z51.81 ENCOUNTER FOR THERAPEUTIC DRUG MONITORING: ICD-10-CM

## 2024-01-15 DIAGNOSIS — R73.03 PREDIABETES: ICD-10-CM

## 2024-01-15 DIAGNOSIS — E78.5 DYSLIPIDEMIA: Primary | ICD-10-CM

## 2024-01-15 DIAGNOSIS — E55.9 VITAMIN D DEFICIENCY: ICD-10-CM

## 2024-01-15 PROCEDURE — 99214 OFFICE O/P EST MOD 30 MIN: CPT | Performed by: NURSE PRACTITIONER

## 2024-01-15 RX ORDER — PHENTERMINE AND TOPIRAMATE 3.75; 23 MG/1; MG/1
1 CAPSULE, EXTENDED RELEASE ORAL DAILY
Qty: 30 CAPSULE | Refills: 3 | Status: SHIPPED | OUTPATIENT
Start: 2024-01-15

## 2024-01-15 RX ORDER — PHENTERMINE AND TOPIRAMATE 3.75; 23 MG/1; MG/1
1 CAPSULE, EXTENDED RELEASE ORAL DAILY
Qty: 30 CAPSULE | Refills: 3 | Status: SHIPPED | OUTPATIENT
Start: 2024-01-15 | End: 2024-01-15

## 2024-01-15 RX ORDER — ROSUVASTATIN CALCIUM 5 MG/1
5 TABLET, COATED ORAL NIGHTLY
COMMUNITY

## 2024-01-16 NOTE — PROGRESS NOTES
Patient:  Suzie Ferris  :      1975  MRN:      [id number]    Chief Complaint:    Chief Complaint   Patient presents with    Follow - Up    Weight Management    Obesity       SUBJECTIVE     History of Present Illness:  Suzie is being seen today for a follow-up for non-surgical weight loss.    Wegovy not covered.     Difficulty tolerating Qsymia- palpitations.     Ongoing knee pain.     Past Medical History:   Past Medical History:   Diagnosis Date    Anemia     Anxiety     Colon polyp     None in . REpeat CLN in .    Herpes simplex type 1 infection     Hyperlipidemia     Menorrhagia     Morbid obesity with BMI of 40.0-44.9, adult ([facility])     Obesity       Comorbidities:  Dyslipidemia     OBJECTIVE     Vitals: /70 (BP Location: Right arm, Patient Position: Sitting, Cuff Size: adult)   Pulse 74   Ht 5' 5.5\" (1.664 m)   Wt 227 lb (103 kg)   SpO2 98%   BMI 37.20 kg/m²     Initial weight loss: +02   Total weight loss: -21   Start weight: 248    Wt Readings from Last 3 Encounters:   01/15/24 227 lb (103 kg)   10/21/23 223 lb (101.2 kg)   23 225 lb (102.1 kg)       Patient Medications:    Current Outpatient Medications   Medication Sig Dispense Refill    Phentermine-Topiramate (QSYMIA) 3.75-23 MG Oral Capsule SR 24 Hr Take 1 capsule by mouth daily. 30 capsule 3    rosuvastatin 5 MG Oral Tab Take 1 tablet (5 mg total) by mouth nightly.      traZODone 50 MG Oral Tab TAKE 1/2 TABLET BY MOUTH NIGHTLY AS NEEDED FOR SLEEP/AGITATION. MAY INCREASE TO A FULL TABLET IF TOLERATED      valACYclovir 500 MG Oral Tab       ibuprofen 600 MG Oral Tab Take 1 tablet (600 mg total) by mouth every 6 (six) hours as needed for Pain.      ALPRAZolam 0.5 MG Oral Tab Take 1 tablet (0.5 mg total) by mouth nightly as needed.      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 GENTLY BLOW NOSE PRIOR TO USE       Allergies:   Patient has no known allergies.     Social History: Reviewed    Surgical History:    Past Surgical History:   Procedure Laterality Date    COLONOSCOPY N/A 9/24/2019    Procedure: COLONOSCOPY;  Surgeon: POLLO Holland MD;  Location: [facility] ENDOSCOPY    D & C      x 2    SALPINGECTOMY Bilateral 03/2017    TUBAL LIGATION       Family History:    Family History   Problem Relation Age of Onset    Diabetes Mother     Hypertension Mother     Diabetes Sister     Diabetes Father     Other (Other) Father        Food Journal  Reviewed and Discussed:       Patient has a Food Journal?: no   Patient is reading nutrition labels?  yes  Average Caloric Intake:     Average CHO Intake:   Is patient exercising? yes less due to knee pain  Type of exercise? Walking    Eating Habits  Patient states the following:  Eats 3 meal(s) per day  Length of time it takes to consume a meal:    # of snacks per day: as stated below   Type of snacks: as stated below   Amount of soda consumption per day:  Rare   Amount of water (in ounces) per day: adequate- adds lemon/lime  Toughest challenge:  knee pain, medication side effects     Nutritional Goals  Eat 3-4 cups of fresh fruits or vegetables daily    Behavior Modifications Reviewed and Discussed  Eat breakfast, Eat 3 meals per day, Plan meals in advance, Read nutrition labels, Drink 64 oz of water per day, Maintain a daily food journal, No drinking 30 minutes before or after meals, Utlize portion control strategies to reduce calorie intake, Identify triggers for eating and manage cues and Eat slowly and take 20 to 30 minutes to complete each meal    Exercise Goals Reviewed and Discussed    Aim for 150 minutes moderate level exercise weekly with 2-3 days strength training    ROS:    Review of Systems   Constitutional: Negative.    Respiratory: Negative.     Cardiovascular: Negative.    Gastrointestinal: Negative.    Genitourinary: Negative.    Musculoskeletal: Negative.    Skin: Negative.     Neurological: Negative.        Physical Exam:   General appearance: alert, appears stated age, cooperative and obese  Head: Normocephalic, without obvious abnormality, atraumatic  Neck: no adenopathy, no carotid bruit, no JVD, supple, symmetrical, trachea midline and thyroid not enlarged, symmetric, no tenderness/mass/nodules  Lungs: clear to auscultation bilaterally  Heart: S1, S2 normal, no murmur, click, rub or gallop, regular rate and rhythm  Abdomen: soft, non-tender; bowel sounds normal; no masses,  no organomegaly and abdomen obese   Extremities: intact, no edema   Pulses: 2+ and symmetric  Skin: intact   Neurologic: Grossly normal    ASSESSMENT     Encounter Diagnosis(ses):   Encounter Diagnoses   Name Primary?    Encounter for therapeutic drug monitoring     Prediabetes     Iron deficiency anemia, unspecified iron deficiency anemia type     Vitamin D deficiency     Obesity (BMI 30-39.9)     Dyslipidemia Yes       PLAN       Iron deficiency anemia: Continue supplement.    DYSLIPIDEMIA: Improved on statin. Recommend dietary changes and lifestyle modifications as discussed below. Monitor.       Lab Results   Component Value Date/Time    CHOLEST 173 01/09/2024 07:45 AM    LDL 73 01/09/2024 07:45 AM    HDL 91 (H) 01/09/2024 07:45 AM    TRIG 44 01/09/2024 07:45 AM    VLDL 7 01/09/2024 07:45 AM     OBESITY:  PREDIABETES:    Goals for next month:  1. Keep a food log.  2. Drink 48-64 ounces of non-caloric beverages per day. No fruit juices or regular soda.  3. Increase activity-upper body exercises, walk 10 minutes per day.  4. Increase fruit and vegetable servings to 5-6 per day.      Reviewed labs:  1/25/23- CMP, CBC in range.  7/25/23- a1C 5.8.  9/14/23- fasting insulin 11.5. a1c 5.9.  O/s labs 7/24/23- A1c 5.8. Fasting insulin improved to 6.6.  Update fasting CMP at this time.     Denies personal or family hx medullary thyroid CA, endocrine neoplasia syndrome, pancreatitis hx, suicidal ideation. No renal  impairment, severe GI disease, diabetes, pancreatitis risks noted.    Wegovy not covered.     Unable to tolerate Qsymia 7.5-46 mg in the AM- palpitations. Reduce dose to 3.75-23 mg in the AM every other day. Increase dose to daily as tolerated.   Consider Contrave if unable to tolerate reduced dose of Qsymia.  Discussed risks, benefits, and side effects of medication.    Abnormal EKG 9/14/23- s/p additional EKG & cardiology evaluation- [place] Dr. Tapia.  Recommendation: Consider initiation of Qysmia; no contraindications; monitor for hypertension and palpitations     80 grams protein/day.  30-40 grams fiber/day.  3 PM handful nuts.     Continue IF.    Follow up 4 months.     ANDRIY Sanders

## 2024-01-19 ENCOUNTER — LAB ENCOUNTER (OUTPATIENT)
Dept: LAB | Facility: HOSPITAL | Age: 49
End: 2024-01-19
Attending: NURSE PRACTITIONER
Payer: MEDICARE

## 2024-01-19 DIAGNOSIS — R73.03 PREDIABETES: ICD-10-CM

## 2024-01-19 DIAGNOSIS — E66.9 OBESITY (BMI 30-39.9): ICD-10-CM

## 2024-01-19 DIAGNOSIS — E55.9 VITAMIN D DEFICIENCY: ICD-10-CM

## 2024-01-19 DIAGNOSIS — Z51.81 ENCOUNTER FOR THERAPEUTIC DRUG MONITORING: ICD-10-CM

## 2024-01-19 DIAGNOSIS — D50.9 IRON DEFICIENCY ANEMIA, UNSPECIFIED IRON DEFICIENCY ANEMIA TYPE: ICD-10-CM

## 2024-01-19 LAB
ALBUMIN SERPL-MCNC: 4.4 G/DL (ref 3.2–4.8)
ALBUMIN/GLOB SERPL: 1.5 {RATIO} (ref 1–2)
ALP LIVER SERPL-CCNC: 71 U/L
ALT SERPL-CCNC: 12 U/L
ANION GAP SERPL CALC-SCNC: 3 MMOL/L (ref 0–18)
AST SERPL-CCNC: 19 U/L (ref ?–34)
BILIRUB SERPL-MCNC: 0.9 MG/DL (ref 0.3–1.2)
BUN BLD-MCNC: 14 MG/DL (ref 9–23)
BUN/CREAT SERPL: 18.4 (ref 10–20)
CALCIUM BLD-MCNC: 9.9 MG/DL (ref 8.7–10.4)
CHLORIDE SERPL-SCNC: 109 MMOL/L (ref 98–112)
CO2 SERPL-SCNC: 29 MMOL/L (ref 21–32)
CREAT BLD-MCNC: 0.76 MG/DL
EGFRCR SERPLBLD CKD-EPI 2021: 97 ML/MIN/1.73M2 (ref 60–?)
FASTING STATUS PATIENT QL REPORTED: YES
GLOBULIN PLAS-MCNC: 3 G/DL (ref 2.8–4.4)
GLUCOSE BLD-MCNC: 97 MG/DL (ref 70–99)
OSMOLALITY SERPL CALC.SUM OF ELEC: 292 MOSM/KG (ref 275–295)
POTASSIUM SERPL-SCNC: 3.8 MMOL/L (ref 3.5–5.1)
PROT SERPL-MCNC: 7.4 G/DL (ref 5.7–8.2)
SODIUM SERPL-SCNC: 141 MMOL/L (ref 136–145)

## 2024-01-19 PROCEDURE — 80053 COMPREHEN METABOLIC PANEL: CPT

## 2024-01-19 PROCEDURE — 36415 COLL VENOUS BLD VENIPUNCTURE: CPT

## 2024-03-01 ENCOUNTER — HOSPITAL ENCOUNTER (EMERGENCY)
Facility: HOSPITAL | Age: 49
Discharge: HOME | End: 2024-03-01
Attending: EMERGENCY MEDICINE
Payer: MEDICARE

## 2024-03-01 VITALS
OXYGEN SATURATION: 99 % | BODY MASS INDEX: 36.48 KG/M2 | SYSTOLIC BLOOD PRESSURE: 124 MMHG | HEART RATE: 94 BPM | DIASTOLIC BLOOD PRESSURE: 78 MMHG | HEIGHT: 66 IN | WEIGHT: 227 LBS | TEMPERATURE: 97 F | RESPIRATION RATE: 18 BRPM

## 2024-03-01 DIAGNOSIS — S39.012A STRAIN OF LUMBAR REGION, INITIAL ENCOUNTER: Primary | ICD-10-CM

## 2024-03-01 PROCEDURE — 99283 EMERGENCY DEPT VISIT LOW MDM: CPT

## 2024-03-01 PROCEDURE — 96372 THER/PROPH/DIAG INJ SC/IM: CPT

## 2024-03-01 RX ORDER — LIDOCAINE 50 MG/G
1 PATCH TOPICAL EVERY 24 HOURS
Qty: 10 PATCH | Refills: 0 | Status: SHIPPED | OUTPATIENT
Start: 2024-03-01 | End: 2024-03-11

## 2024-03-01 RX ORDER — ACETAMINOPHEN 500 MG
1000 TABLET ORAL ONCE
Status: COMPLETED | OUTPATIENT
Start: 2024-03-01 | End: 2024-03-01

## 2024-03-01 RX ORDER — KETOROLAC TROMETHAMINE 30 MG/ML
30 INJECTION, SOLUTION INTRAMUSCULAR; INTRAVENOUS ONCE
Status: COMPLETED | OUTPATIENT
Start: 2024-03-01 | End: 2024-03-01

## 2024-03-01 RX ORDER — PREDNISONE 20 MG/1
40 TABLET ORAL DAILY
Qty: 10 TABLET | Refills: 0 | Status: SHIPPED | OUTPATIENT
Start: 2024-03-01 | End: 2024-03-06

## 2024-03-01 RX ORDER — CYCLOBENZAPRINE HCL 10 MG
10 TABLET ORAL 3 TIMES DAILY PRN
Qty: 15 TABLET | Refills: 0 | Status: SHIPPED | OUTPATIENT
Start: 2024-03-01 | End: 2024-03-08

## 2024-03-01 RX ORDER — KETOROLAC TROMETHAMINE 10 MG/1
10 TABLET, FILM COATED ORAL EVERY 6 HOURS PRN
Qty: 15 TABLET | Refills: 0 | Status: SHIPPED | OUTPATIENT
Start: 2024-03-01 | End: 2024-03-08

## 2024-03-01 NOTE — ED PROVIDER NOTES
Patient Seen in: [facility] Emergency Department    History   No chief complaint on file.    Stated Complaint: Back Pain    HPI    Chief complaint: Back pain    History of present illness:  The patient complains of back pain, that began yesterday.   Patient reports no specific injury.  Pain is described as sharp and radiating, pain is worse with movement bending and is improved with remaining still.  Currently rated 10/10.     A review of pertinent red flag issues reveals no history of fever, IV drug use, urinary retention, history of immunosuppressive therapy, history of cancer or weight loss. No saddle anesthesia, perianal numbness, stool/bladder incontinence.      Past Medical History:   Diagnosis Date    Anemia     Anxiety     Colon polyp 2019    None in 2022. REpeat CLN in 2032.    Herpes simplex type 1 infection     Hyperlipidemia     Menorrhagia     Morbid obesity with BMI of 40.0-44.9, adult (HCC)     Obesity        Past Surgical History:   Procedure Laterality Date    COLONOSCOPY N/A 9/24/2019    Procedure: COLONOSCOPY;  Surgeon: POLLO Holland MD;  Location: [facility] ENDOSCOPY    D & C      x 2    SALPINGECTOMY Bilateral 03/2017    TUBAL LIGATION              Family History   Problem Relation Age of Onset    Diabetes Mother     Hypertension Mother     Diabetes Sister     Diabetes Father     Other (Other) Father        Social History     Socioeconomic History    Marital status: Single   Tobacco Use    Smoking status: Never    Smokeless tobacco: Never   Vaping Use    Vaping Use: Never used   Substance and Sexual Activity    Alcohol use: Yes     Comment: rarely    Drug use: No    Sexual activity: Yes     Partners: Male       Review of Systems    Positive for stated complaint: Back Pain  Other systems are as noted in HPI.  Constitutional and vital signs reviewed.      All other systems reviewed and negative except as noted above.    PSFH elements reviewed from today and agreed except as otherwise stated in  HPI.    Physical Exam     ED Triage Vitals [03/01/24 0721]   /77   Pulse 100   Resp 18   Temp 97 °F (36.1 °C)   Temp src    SpO2 98 %   O2 Device None (Room air)       Current:/77   Pulse 100   Temp 97 °F (36.1 °C)   Resp 18   Ht 167.6 cm (5' 6\")   Wt 103 kg   SpO2 98%   BMI 36.64 kg/m²     PULSE OX nl      General: Patient appears in mild distress, slow to transition  Neck: Supple, full range of motion, no midline bony tenderness  Abdomen: Soft nontender nondistended, no mass or prominent aortic pulsation  Back: Tender to palpation in  b lumbar paraspinal region, no midline bony tenderness, no erythema, decreased range of motion secondary to pain and spasm  Straight leg tests: neg  Neuro: Patient is alert and oriented x3, able to ambulate with steady gait, 5 out of 5 strength bilateral lower extremities hips knees and ankle flexion and extension, dorsiflexion and plantarflexion of the foot preserved bilateral 5 out of 5 strength, sensation intact from sacral region throughout lower extremities down to the dorsal surface of the foot, 2+ DTRs of the knee and ankle  Extremities:Nontender full range of motion in bilateral lower extremities, no edema, 2+ distal pulses         ED Course   Labs Reviewed - No data to display    MDM         Medical Decision Making  Problems Addressed:  Strain of lumbar region, initial encounter: acute illness or injury    Amount and/or Complexity of Data Reviewed  Discussion of management or test interpretation with external provider(s): Tylenol, motrin recommended.      Risk  OTC drugs.  Prescription drug management.              Disposition and Plan     Clinical Impression:  1. Strain of lumbar region, initial encounter        Disposition:  Discharge    Follow-up:  Valentin Cantu MD  [address] [place] [id number]  [phone]    Follow up        Medications Prescribed:  Current Discharge Medication List        START taking these medications     Details   predniSONE 20 MG Oral Tab Take 2 tablets (40 mg total) by mouth daily for 5 days.  Qty: 10 tablet, Refills: 0      cyclobenzaprine 10 MG Oral Tab Take 1 tablet (10 mg total) by mouth 3 (three) times daily as needed for Muscle spasms.  Qty: 15 tablet, Refills: 0      lidocaine 5 % External Patch Place 1 patch onto the skin daily for 10 days.  Qty: 10 patch, Refills: 0      Ketorolac Tromethamine 10 MG Oral Tab Take 1 tablet (10 mg total) by mouth every 6 (six) hours as needed for Pain.  Qty: 15 tablet, Refills: 0             Present on Admission:  **None**

## 2024-03-01 NOTE — ED INITIAL ASSESSMENT (HPI)
Pt to the ed fo lower back pain that began last night  Reports pain > R, worsens when bending  No relief with ibuprofen or heat

## 2024-04-02 ENCOUNTER — OFFICE VISIT (OUTPATIENT)
Dept: PHYSICAL MEDICINE AND REHAB | Facility: CLINIC | Age: 49
End: 2024-04-02
Payer: MEDICARE

## 2024-04-02 ENCOUNTER — HOSPITAL ENCOUNTER (OUTPATIENT)
Dept: GENERAL RADIOLOGY | Facility: HOSPITAL | Age: 49
Discharge: HOME | End: 2024-04-02
Attending: PHYSICAL MEDICINE & REHABILITATION
Payer: MEDICARE

## 2024-04-02 DIAGNOSIS — E66.9 OBESITY (BMI 30-39.9): ICD-10-CM

## 2024-04-02 DIAGNOSIS — F32.4 MAJOR DEPRESSIVE DISORDER WITH SINGLE EPISODE, IN PARTIAL REMISSION (HCC): ICD-10-CM

## 2024-04-02 DIAGNOSIS — M47.816 LUMBAR SPONDYLOSIS: Primary | ICD-10-CM

## 2024-04-02 DIAGNOSIS — M47.816 LUMBAR SPONDYLOSIS: ICD-10-CM

## 2024-04-02 DIAGNOSIS — F41.0 PANIC ATTACKS: ICD-10-CM

## 2024-04-02 DIAGNOSIS — K21.9 GASTROESOPHAGEAL REFLUX DISEASE WITHOUT ESOPHAGITIS: ICD-10-CM

## 2024-04-02 PROCEDURE — 72100 X-RAY EXAM L-S SPINE 2/3 VWS: CPT | Performed by: PHYSICAL MEDICINE & REHABILITATION

## 2024-04-02 PROCEDURE — 99213 OFFICE O/P EST LOW 20 MIN: CPT | Performed by: PHYSICAL MEDICINE & REHABILITATION

## 2024-04-02 NOTE — PROGRESS NOTES
[facility]  Progress Note    CHIEF COMPLAINT:    Chief Complaint   Patient presents with    New Patient     She was in the ER on 3/1 and states pain in the lower back and pain was a10/10. Denies injury. She states that afte er the er visit she got trazodone and helped a lot. She felt the pain coming back last week and fell this morning which made the pain a lot worse again. Currently not on any meds nor PT.        History of Present Illness:  Suzie Ferris is a 48 year old female who is being seen in consultation at the request of Dr. Edwin Hayward.  She was seen in the emergency department in early March for a chief complaint of acute onset low back pain.  She was given prednisone, Flexeril and lidocaine patches.  These helped, but the pain is still intermittent and migratory across the low back.  No leg radiation, numbness or tingling.  She was also given trazodone which helps her sleep.  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    PAST MEDICAL HISTORY:  Past Medical History:   Diagnosis Date    Anemia     Anxiety     Colon polyp 2019    None in 2022. REpeat CLN in 2032.    Herpes simplex type 1 infection     Hyperlipidemia     Menorrhagia     Morbid obesity with BMI of 40.0-44.9, adult (HCC)     Obesity        SURGICAL HISTORY:  Past Surgical History:   Procedure Laterality Date    COLONOSCOPY N/A 9/24/2019    Procedure: COLONOSCOPY;  Surgeon: POLLO Holland MD;  Location: [facility] ENDOSCOPY    D & C      x 2    SALPINGECTOMY Bilateral 03/2017    TUBAL LIGATION         SOCIAL HISTORY:   Social History     Occupational History    Not on file   Tobacco Use    Smoking status: Never    Smokeless tobacco: Never   Vaping Use    Vaping Use: Never used   Substance and Sexual Activity    Alcohol use: Yes     Comment: rarely    Drug use: No    Sexual activity: Yes     Partners: Male       CURRENT MEDICATIONS:   Current Outpatient  Medications   Medication Sig Dispense Refill    Phentermine-Topiramate (QSYMIA) 3.75-23 MG Oral Capsule SR 24 Hr Take 1 capsule by mouth daily. 30 capsule 3    rosuvastatin 5 MG Oral Tab Take 1 tablet (5 mg total) by mouth nightly.      traZODone 50 MG Oral Tab TAKE 1/2 TABLET BY MOUTH NIGHTLY AS NEEDED FOR SLEEP/AGITATION. MAY INCREASE TO A FULL TABLET IF TOLERATED      valACYclovir 500 MG Oral Tab       ibuprofen 600 MG Oral Tab Take 1 tablet (600 mg total) by mouth every 6 (six) hours as needed for Pain.      ALPRAZolam 0.5 MG Oral Tab Take 1 tablet (0.5 mg total) by mouth nightly as needed.      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 GENTLY BLOW NOSE PRIOR TO USE         ALLERGIES:   No Known Allergies    REVIEW OF SYSTEMS:   No patient-reported data collected this visit.            PHYSICAL EXAM:   There were no vitals taken for this visit.    There is no height or weight on file to calculate BMI.      General: No immediate distress  Head: Normocephalic/ Atraumatic  Extremities: No lower extremity edema bilaterally. Peripheral pulses intact.   Spine:  full and painfree lumbar ROM in all directions, tender over the lumbosacral junction  Hips: full and painfree ROM   Neuro:   Cognition: alert & oriented x 3, attentive, able to follow 2 step commands, comprehention intact, spontaneous speech intact  Strength: Lower extremities have 5/5 strength  Sensation: Normal lower extremities  Reflexes: Normal lower extremities  SLR: neg    Data    Radiology Imaging:  None for this condition      ASSESSMENT AND PLAN:  1. Lumbar spondylosis  She may have facet arthropathy versus myofascial pain.  Given the severity of the pain I recommend a plain film x-ray, also physical therapy.  His medicines do not really help, I will not add any new ones to her profile.  I reassured her this appears to be a benign condition and I will ask her  return to see me in 4 weeks.  - XR LUMBAR SPINE (MIN 2 VIEWS) (CPT=72100); Future  - PHYSICAL THERAPY - INTERNAL    2. Gastroesophageal reflux disease without esophagitis  No NSAIDs, limits treatment options    3. Obesity (BMI 30-39.9)  She is already seeing weight loss clinic    4. Major depressive disorder with single episode, in partial remission (HCC)  Negative comorbidity for painful conditions    5. Panic attacks  Negative comorbidity for painful conditions        RTC: 4 weeks      The patient was in agreement with the assessment and plan.  All questions were answered.        Valentin Cantu MD  Physical Medicine and Rehabilitation/Sports Medicine  [facility]

## 2024-04-03 ENCOUNTER — TELEPHONE (OUTPATIENT)
Dept: PHYSICAL MEDICINE AND REHAB | Facility: CLINIC | Age: 49
End: 2024-04-03

## 2024-04-03 ENCOUNTER — OFFICE VISIT (OUTPATIENT)
Dept: PHYSICAL THERAPY | Facility: HOSPITAL | Age: 49
End: 2024-04-03
Attending: PHYSICAL MEDICINE & REHABILITATION
Payer: MEDICARE

## 2024-04-03 DIAGNOSIS — M47.816 LUMBAR SPONDYLOSIS: Primary | ICD-10-CM

## 2024-04-03 PROCEDURE — 97161 PT EVAL LOW COMPLEX 20 MIN: CPT

## 2024-04-03 PROCEDURE — 97110 THERAPEUTIC EXERCISES: CPT

## 2024-04-03 NOTE — TELEPHONE ENCOUNTER
MCM sent to patient with results notes below.    ----- Message from Valentin Cantu MD sent at 4/2/2024  8:18 PM CDT -----  I personally reviewed a plain film x-ray of the lumbar spine showing hyperlordosis, facet and sacroiliac arthropathy, and decreased disc space at L5-S1.    Please let the patient know that I reviewed her x-rays.  It shows mild arthritis.  Continue physical therapy for now.

## 2024-04-03 NOTE — PROGRESS NOTES
SPINE EVALUATION:     Diagnosis:   Lumbar spondylosis (M47.816)       Referring Provider: Valentin Cantu  Date of Evaluation: 4/3/2024    Precautions:  None Next MD visit: none scheduled  Date of Surgery: n/a     PATIENT SUMMARY   Suzie Ferris is a 48 year old female who presents to therapy today with complaints of intermittent pain in B mid to lower lumbar region. She also reports occasional ant hip pain. She states when the pain is worse in the R lumbar region, it impacts her gait with the R LE. Aggravates: bending, lifting, prolonged walking; Relieves: ibuprofen 2 pills 2-3x/day as needed.  Pt describes pain level current 5/10, at best 0/10, at worst 8/10.   History of current condition: several months ago she had onset of back pain, went to the ER and was given muscle relaxants which helped and she felt 100% better and was back to working out.  About a month ago she had back pain and the next morning when awakened with severe pain in her back. She went back to the ER and was given pain meds which worked temporarily. She has had xrays.   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     Suzie describes prior level of function walking 5 miles 3-4x/wk which she was noticing as being aggravating, Elliptical 30 min 3x/wk, free weights for UE. Pt goals include decrease pain, increase strength in LE's, increase tolerance for work activities, bending, lifting, walking for exercise.  Past medical history was reviewed with Suzie. Significant findings include R knee OA and meniscal tear  has a past medical history of Anemia, Anxiety, Colon polyp (2019), Herpes simplex type 1 infection, Hyperlipidemia, Menorrhagia, Morbid obesity with BMI of 40.0-44.9, adult  (HCC), and Obesity.    Pt denies diplopia, dysarthria, dysphasia, dizziness, drop attacks, bowel/bladder changes, saddle anesthesia, and LUBNA LE N/T.  Xray:   There is lower lumbar predominant facet arthropathy.  The lower lumbar facet arthropathy is progressed when compared to 12/05/2017.   SACROILIAC JOINTS: There is sclerosis of the bilateral sacroiliac joints   ASSESSMENT  Suzie presents to physical therapy evaluation with primary c/o intermittent pain in the B mid to lower lumbar region = 0-8/10 with no identified pattern. The results of the objective tests and measures show: .  Functional deficits include but are not limited to decreased tolerance for bending, lifting, work activities, household chores, prolonged walking.  Pt and PT discussed evaluation findings, pathology, POC and HEP.  Pt voiced understanding and performs HEP correctly without reported pain. Skilled Physical Therapy is medically necessary to address the above impairments and reach functional goals.     OBJECTIVE:   Observation/Posture: crests level, increased lumbar lordosis    Lumbar AROM: moderate decrease for extension, decreased segmental mobility in lumbar spine for flexion    Strength: (* denotes performed with pain)  LE   Hip flexion (L2): R 5/5; L 5/5  Knee Flexion: R 5/5; L 5/5   Knee extension (L3): R 5/5; L 5/5   DF (L4): R 5/5; L 5/5  Great Toe Ext (L5): R 5/5, L 5/5  PF (S1): R 5/5; L 5/5     Flexibility: mild decrease for B hamstrings; moderate decrease for B piriformis    Special tests:   B Slump negative  B SLR negative    Gait: pt ambulates on level ground with normal mechanics.    Today’s Treatment and Response:   Pt education was provided on exam findings, treatment diagnosis, treatment plan, expectations, and prognosis.   Patient was instructed in and issued a HEP for: posterior pelvic tilt, SKC, DKC, piriformis stretch    Charges: PT Eval Low Complexity, There Ex 1      Total Timed Treatment: 10 min     Total Treatment  Time: 45 min     PLAN OF CARE:    Goals: (to be met in 12 visits)   Physical Therapy Goals:  Patient will be independent in a HEP for lumbar stabilization, flexibility, and neuromobilization as indicated to reduce the risk of further irritation or injury  Patient will demonstrate correct back protection techniques for sitting, bending and lifting to reduce the risk of further irritation or injury  Symptoms will decrease by 75% to increase tolerance for bending, lifting, walking, household chores  Patient will report increased functional activity tolerance for work and household activities  Strength of lumbar stabilizers will increase to increase tolerance for lifting  Pt will be able to walk longer distances     Frequency / Duration: Patient will be seen for 1 x/week or a total of 12 visits over a 90 day period. Treatment will include: Gait training, Manual Therapy, Neuromuscular Re-education, Self-Care Home Management, Therapeutic Activities, Therapeutic Exercise, and Home Exercise Program instruction    Education or treatment limitation: None  Rehab Potential:good    Oswestry Disability Index Score  Score: 34 % (4/3/2024 10:13 AM)      Patient/Family/Caregiver was advised of these findings, precautions, and treatment options and has agreed to actively participate in planning and for this course of care.    Thank you for your referral. Please co-sign or sign and return this letter via fax as soon as possible to [phone]. If you have any questions, please contact me at Dept: [phone]    Sincerely,  Electronically signed by therapist: Lela Zaldivar PT, S    Physician's certification required: Yes  I certify the need for these services furnished under this plan of treatment and while under my care.    X___________________________________________________ Date____________________    Certification From: 4/3/2024  To:7/2/2024

## 2024-04-03 NOTE — PATIENT INSTRUCTIONS
Healthy Diet and Regular Exercise  The Foundation of Good Health    Tips for making healthy food choices    Enjoy your food, but eat less.  Fully enjoy your food when eating.   Don’t eat while distracted and slow down.   Avoid over sized portions.   Don’t eat while when you’re bored.     EAT THESE FOODS MORE OFTEN: EAT THESE FOODS LESS OFTEN:   Make half your plate fruits and vegetables Highly refined, white starches including white bread, rice and pasta   Eat plenty of protein, keep the fat content low Sugars:  sodas and sports drinks, candies and desserts   Eat plenty of low-fat dairy products High fat meats and dairy   Choose whole grain products Foods high in sodium   Water is best for hydration Fast food.   Eat at home when possible     Tips for increasing your physical activity - Adults who are physically active are less likely to develop some chronic diseases than adults who are inactive.     HOW TO GET STARTED: HOW TO STAY MOTIVATED:   Start activities slowly and build up over time Do what you like   Get your heart pumping - brisk walking, biking, swimming Even 10 minute increments are effective and add up over the week   2 ½ hours per week - spread out over time Use a joy to keep you motivated   Don’t forget strength training with weights and resistance Set goals and track your progress   You don’t need to join a gym.   Home exercises work great. Put more priority on exercise in your life

## 2024-04-10 ENCOUNTER — OFFICE VISIT (OUTPATIENT)
Dept: PHYSICAL THERAPY | Facility: HOSPITAL | Age: 49
End: 2024-04-10
Attending: PHYSICAL MEDICINE & REHABILITATION
Payer: MEDICARE

## 2024-04-10 PROCEDURE — 97530 THERAPEUTIC ACTIVITIES: CPT

## 2024-04-10 PROCEDURE — 97110 THERAPEUTIC EXERCISES: CPT

## 2024-04-10 NOTE — PROGRESS NOTES
Diagnosis:   Lumbar spondylosis (M47.816)        Referring Provider: Valentin Cantu  Date of Evaluation: 4/3/2024    Precautions:  None Next MD visit: none scheduled  Date of Surgery: n/a     Insurance Primary/Secondary: MEDICARE / N/A       # Auth Visits: 12   Total Timed Treatment: 45 min  Date POC Expires: 7/2   Total Treatment time: 45 min       Charges: There Ex 2, There Act 1       Treatment Number: 2    Subjective: Pt states she did a lot of housework, and Monday she was in a lot of pain in the L lumbar region and buttock. She took ibuprofen and felt better by Tuesday. Since then she did the bike for 20', Elliptical for 5 min, and walked for 40 minutes and tolerated it well. She stats she generally feels very stiff after she works out and first thing in the morning.  Pain: 2/10  B lumbar    Objective/Goals: Rx: flow chart     4/10/2024   Visit: 2       HEP Post pelvic tilt  SKC  DKC  Piriformis stretch  LTR  Clam  TA      Therapeutic Exercises Post pelvic tilt x 3  SKC x 1  DKC x 2  Piriformis stretch x 1  LTR x 3  Clam x 10  TA retraining  35'      Manual       Neuro ReEducation          Therapeutic Activity Inst in back protection techniques for bending and lifting with application to household chores and lifting children at work  10'      Modalities         HEP to date: post pelvic tilt, SKC, DKC, LTR, piriformis stretch, clam TA    Education:     Assessment: Able to perform all above without provocation      Plan: progress abdominal stab, add abd, bridge, hip flexor stretch.  From initial eval for reference:  complaints of intermittent pain in B mid to lower lumbar region. She also reports occasional ant hip pain. She states when the pain is worse in the R lumbar region, it impacts her gait with the R LE. Aggravates: bending, lifting, prolonged walking; Relieves: ibuprofen 2 pills 2-3x/day as needed.  Pt describes pain level current 5/10, at best 0/10, at worst 8/10.

## 2024-04-17 ENCOUNTER — OFFICE VISIT (OUTPATIENT)
Dept: PHYSICAL THERAPY | Facility: HOSPITAL | Age: 49
End: 2024-04-17
Attending: PHYSICAL MEDICINE & REHABILITATION
Payer: MEDICARE

## 2024-04-17 PROCEDURE — 97110 THERAPEUTIC EXERCISES: CPT

## 2024-04-17 PROCEDURE — 97112 NEUROMUSCULAR REEDUCATION: CPT

## 2024-04-24 ENCOUNTER — OFFICE VISIT (OUTPATIENT)
Dept: PHYSICAL THERAPY | Facility: HOSPITAL | Age: 49
End: 2024-04-24
Attending: PHYSICAL MEDICINE & REHABILITATION
Payer: MEDICARE

## 2024-04-24 PROCEDURE — 97112 NEUROMUSCULAR REEDUCATION: CPT

## 2024-04-24 PROCEDURE — 97110 THERAPEUTIC EXERCISES: CPT

## 2024-04-24 NOTE — PROGRESS NOTES
Progress Summary  Pt has attended 4 visits in Physical Therapy from 4/3 - 4/24/2024..     Diagnosis:   Lumbar spondylosis (M47.816)        Referring Provider: Valentin Cantu  Date of Evaluation: 4/3/2024    Precautions:  None Next MD visit: none scheduled  Date of Surgery: n/a       Subjective: Pt states she continues to note intermittent R > L lumbar pain, as well as stiffness in her lumbar and thoracic spine and (B)LE's with sit to stand and initial ambulation after sitting, moreso following healthclub activities. However, overall she notes some improvement with both pain and stiffness, and she states she feels the exercises and utilizing back protection techniques with bending and lifting are helping.    Current Pain: 4/10  B lumbar stiffness    Objective/Goals: Pt has been seen 1x/wk for HEP instruction, inst in back protection techniques, lumbar stabilization, lumbar mobility, stretching.    Assessment: Pt is tolerating all activities in therapy well, and her symptoms are responding gradually.    Plan: Continue 1x/wk with progressive stabilization, mobility and flexibility.    Today's Rx:  Pt arrived 15' min late for appt.   4/10/2024   Visit: 2  4/17/2024   3  4/24/2024   4     HEP Post pelvic tilt  SKC  DKC  Piriformis stretch  LTR  Clam  TA Standing hip flexor stretch  Bridge  Abd sidelying  Standing rectus stretch  Pt advised to stretch prior to leaving health club  90/90 hand on knee      Therapeutic Exercises Post pelvic tilt x 3  SKC x 1  DKC x 2  Piriformis stretch x 1  LTR x 3  Clam x 10  TA retraining  35'  Standing hip flexor stretch x 3  Bridge x 10  Abd 10 x 2  NuStep seat and arms 12, L5 x 6'   30'  X 1   Rectus stretch x 1  Bridge x 10  Abd x 10  NuStep x 8'  15'     Manual       Neuro ReEducation     Heel slide  Bent knee fall out  10'  X   X  90/90 with hand on knee  15'    Therapeutic Activity Inst in back protection techniques for bending and lifting with application to household chores  and lifting 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

## 2024-04-30 ENCOUNTER — OFFICE VISIT (OUTPATIENT)
Dept: PHYSICAL MEDICINE AND REHAB | Facility: CLINIC | Age: 49
End: 2024-04-30
Payer: MEDICARE

## 2024-04-30 ENCOUNTER — OFFICE VISIT (OUTPATIENT)
Dept: PODIATRY CLINIC | Facility: CLINIC | Age: 49
End: 2024-04-30

## 2024-04-30 VITALS — HEART RATE: 77 BPM | DIASTOLIC BLOOD PRESSURE: 69 MMHG | SYSTOLIC BLOOD PRESSURE: 108 MMHG

## 2024-04-30 DIAGNOSIS — G57.62 PLANTAR NEUROMA OF LEFT FOOT: Primary | ICD-10-CM

## 2024-04-30 DIAGNOSIS — S83.92XA SPRAIN OF LEFT KNEE, UNSPECIFIED LIGAMENT, INITIAL ENCOUNTER: Primary | ICD-10-CM

## 2024-04-30 PROCEDURE — 99214 OFFICE O/P EST MOD 30 MIN: CPT | Performed by: STUDENT IN AN ORGANIZED HEALTH CARE EDUCATION/TRAINING PROGRAM

## 2024-04-30 PROCEDURE — 99214 OFFICE O/P EST MOD 30 MIN: CPT | Performed by: PHYSICAL MEDICINE & REHABILITATION

## 2024-04-30 RX ORDER — TRIAMCINOLONE ACETONIDE 40 MG/ML
40 INJECTION, SUSPENSION INTRA-ARTICULAR; INTRAMUSCULAR ONCE
Status: COMPLETED | OUTPATIENT
Start: 2024-04-30 | End: 2024-04-30

## 2024-04-30 RX ORDER — TRAMADOL HYDROCHLORIDE 50 MG/1
50 TABLET ORAL EVERY 12 HOURS PRN
Qty: 60 TABLET | Refills: 0 | Status: SHIPPED | OUTPATIENT
Start: 2024-04-30 | End: 2024-06-29

## 2024-04-30 RX ORDER — DEXAMETHASONE SODIUM PHOSPHATE 4 MG/ML
4 VIAL (ML) INJECTION ONCE
Status: COMPLETED | OUTPATIENT
Start: 2024-04-30 | End: 2024-04-30

## 2024-04-30 RX ORDER — PREDNISONE 10 MG/1
TABLET ORAL
Qty: 28 TABLET | Refills: 0 | Status: SHIPPED | OUTPATIENT
Start: 2024-04-30

## 2024-04-30 RX ORDER — MELOXICAM 15 MG/1
15 TABLET ORAL DAILY
Qty: 30 EACH | Refills: 0 | Status: SHIPPED | OUTPATIENT
Start: 2024-04-30

## 2024-04-30 RX ADMIN — DEXAMETHASONE SODIUM PHOSPHATE 4 MG: 4 MG/ML VIAL (ML) INJECTION at 15:55:00

## 2024-04-30 RX ADMIN — TRIAMCINOLONE ACETONIDE 40 MG: 40 INJECTION, SUSPENSION INTRA-ARTICULAR; INTRAMUSCULAR at 15:55:00

## 2024-04-30 NOTE — PROGRESS NOTES
[facility]  Progress Note      Suzie Ferris is a 48 year old female.   Chief Complaint   Patient presents with    Consult     Left foot pain- patient states pain started years ago, comes and goes. Patient denies any injury.patient saw Dr Chang on 7/24/23 and had cortisone injection and it helped. Patient states pain came back 2 weeks ago.              HPI:     Patient is a pleasant 48-year-old female presents to clinic today for evaluation of left third interspace neuroma pain.  Patient admits that she had a cortisone injection by Dr. Tinajero on July 2023 which provided her with relief until 2 weeks ago.  Admits to pain with pressure and when walking.    Allergies: Patient has no known allergies.    Current Outpatient Medications   Medication Sig Dispense Refill    predniSONE 10 MG Oral Tab Take 7 tablets today,take 6 tablets tomorrow, then decrease number of tablets by one tablet each of the remaining days 28 tablet 0    Meloxicam 15 MG Oral Tab Take 1 tablet (15 mg total) by mouth daily. 30 each 0    traMADol 50 MG Oral Tab Take 1 tablet (50 mg total) by mouth every 12 (twelve) hours as needed for Pain. 60 tablet 0    Phentermine-Topiramate (QSYMIA) 3.75-23 MG Oral Capsule SR 24 Hr Take 1 capsule by mouth daily. 30 capsule 3    rosuvastatin 5 MG Oral Tab Take 1 tablet (5 mg total) by mouth nightly.      traZODone 50 MG Oral Tab TAKE 1/2 TABLET BY MOUTH NIGHTLY AS NEEDED FOR SLEEP/AGITATION. MAY INCREASE TO A FULL TABLET IF TOLERATED      valACYclovir 500 MG Oral Tab       ibuprofen 600 MG Oral Tab Take 1 tablet (600 mg total) by mouth every 6 (six) hours as needed for Pain.      ALPRAZolam 0.5 MG Oral Tab Take 1 tablet (0.5 mg total) by mouth nightly as needed.      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 GENTLY BLOW NOSE PRIOR TO USE        Past Medical History:    Anemia    Anxiety    Colon polyp     None in 2022. REpeat CLN in 2032.    Herpes simplex type 1 infection    Hyperlipidemia    Menorrhagia    Morbid obesity with BMI of 40.0-44.9, adult (HCC)    Obesity      Past Surgical History:   Procedure Laterality Date    Colonoscopy N/A 9/24/2019    Procedure: COLONOSCOPY;  Surgeon: POLLO Holland MD;  Location: [facility] ENDOSCOPY    D & c      x 2    Salpingectomy Bilateral 03/2017    Tubal ligation        Family History   Problem Relation Age of Onset    Diabetes Mother     Hypertension Mother     Diabetes Sister     Diabetes Father     Other (Other) Father       Social History     Socioeconomic History    Marital status: Single   Tobacco Use    Smoking status: Never    Smokeless tobacco: Never   Vaping Use    Vaping status: Never Used   Substance and Sexual Activity    Alcohol use: Yes     Comment: rarely    Drug use: No    Sexual activity: Yes     Partners: Male           REVIEW OF SYSTEMS:     Denies nause, fever, chills  No calf pain  Denies chest pain or SOB      EXAM:   /69   Pulse 77   GENERAL: well developed, well nourished, in no apparent distress  EXTREMITIES:   1. Integument: Normal skin temperature and turgor  2. Vascular: Dorsalis pedis two out of four bilateral and posterior tibial pulses two out of   four bilateral, capillary refill normal.   3. Musculoskeletal: All muscle groups are graded 5 out of 5 in the foot and ankle.  Palpable left third interdigital Celia's click.  Pain with palpation to left third interspace.   4. Neurological: Normal sharp dull sensation; reflexes normal.             ASSESSMENT AND PLAN:   Diagnoses and all orders for this visit:    Plantar neuroma of left foot  -     dexamethasone (Decadron) 4 MG/ML injection 4 mg  -     triamcinolone acetonide (Kenalog-40) 40 MG/ML injection 40 mg        Plan:     Discussed both the pathology and etiology of a neuroma condition.   Discussed with patient regarding the biomechanical factors that cause or aggravate the neuroma  condition.   Discussed various treatment options with patient which consists of continued observation vs surgical intervention or neuroma injection   Advised on purchasing metatarsal pad to offload forefoot in the shoe.  Recommend steroid injection this date.    Prepped area with alcohol. The third interspace of left  foot  was injected through the dorsal aspect using  1cc Lidocaine  Plain, 1cc of Dexamethasone and 1cc of Kenalog 40 . Pt tolerated the procedure well hemostasis achieved and injection site covered with bandaid. . There were no complications. Patient educated on steroid flare up along with acute signs of infection and advised to seek immediate medical attention if symptoms arise    RTC in 3 months       The patient indicates understanding of these issues and agrees to the plan.        Kayy Resendez DPM

## 2024-04-30 NOTE — PROGRESS NOTES
Per verbal order from Dr Kayy Resendez, draw up 1ml of 1% lidocaine, 1ml of Dexamethasone and 1ml of Kenalog 40 for injection to  left foot.

## 2024-04-30 NOTE — PROGRESS NOTES
[facility]  Progress Note    CHIEF COMPLAINT:    Chief Complaint   Patient presents with    Follow - Up     LOV: 4/2/20224 Lower back pain she has been going to Pt and states that it has been helping. Yes[place] she [place] the [place]. She went to the ER and they took the Xrays. Left knee is swollen. She fell on the left side. She left the Err because it was crowded        History of Present Illness:  Suzie Ferris is a 48 year old female who presents today for follow up for symptoms of left leg pain.  I saw her previously for chronic low back pain and performed an EMG for right carpal tunnel.  Yesterday she fell in the mall, injured her knee, went to the [facility] ER afterward x-rays were taken but she left because she was not seen for hours.  She has pain in the left knee, she feels it swollen, the whole leg hurts.    PAST MEDICAL HISTORY:  Past Medical History:    Anemia    Anxiety    Colon polyp    None in 2022. REpeat CLN in 2032.    Herpes simplex type 1 infection    Hyperlipidemia    Menorrhagia    Morbid obesity with BMI of 40.0-44.9, adult (HCC)    Obesity       SURGICAL HISTORY:  Past Surgical History:   Procedure Laterality Date    Colonoscopy N/A 9/24/2019    Procedure: COLONOSCOPY;  Surgeon: POLLO Holland MD;  Location: [facility] ENDOSCOPY    D & c      x 2    Salpingectomy Bilateral 03/2017    Tubal ligation         SOCIAL HISTORY:   Social History     Occupational History    Not on file   Tobacco Use    Smoking status: Never    Smokeless tobacco: Never   Vaping Use    Vaping status: Never Used   Substance and Sexual Activity    Alcohol use: Yes     Comment: rarely    Drug use: No    Sexual activity: Yes     Partners: Male       CURRENT MEDICATIONS:   Current Outpatient Medications   Medication Sig Dispense Refill    predniSONE 10 MG Oral Tab Take 7 tablets today,take 6 tablets tomorrow, then decrease number of tablets by one tablet each of the  remaining days 28 tablet 0    Meloxicam 15 MG Oral Tab Take 1 tablet (15 mg total) by mouth daily. 30 each 0    traMADol 50 MG Oral Tab Take 1 tablet (50 mg total) by mouth every 12 (twelve) hours as needed for Pain. 60 tablet 0    Phentermine-Topiramate (QSYMIA) 3.75-23 MG Oral Capsule SR 24 Hr Take 1 capsule by mouth daily. 30 capsule 3    rosuvastatin 5 MG Oral Tab Take 1 tablet (5 mg total) by mouth nightly.      traZODone 50 MG Oral Tab TAKE 1/2 TABLET BY MOUTH NIGHTLY AS NEEDED FOR SLEEP/AGITATION. MAY INCREASE TO A FULL TABLET IF TOLERATED      valACYclovir 500 MG Oral Tab       ibuprofen 600 MG Oral Tab Take 1 tablet (600 mg total) by mouth every 6 (six) hours as needed for Pain.      ALPRAZolam 0.5 MG Oral Tab Take 1 tablet (0.5 mg total) by mouth nightly as needed.      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 GENTLY BLOW NOSE PRIOR TO USE         ALLERGIES:   No Known Allergies        PHYSICAL EXAM:   There were no vitals taken for this visit.    There is no height or weight on file to calculate BMI.      General: No immediate distress  Extremities: No lower extremity edema bilaterally, full ankle range of motion bilaterally, left Levick diffusely tender to light touch palpation  Spine: full and painfree lumbar ROM in all directions  Hips: full and painfree ROM   Knees: Limited knee flexion with pain at end range bilaterally, no effusion  Neuro:   Cognition: alert & oriented x 3, attentive, able to follow 2 step commands, comprehention intact, spontaneous speech intact  Strength: Lower extremities have 5/5 strength  Sensation: Normal lower extremities  Reflexes: Normal lower extremities  SLR: neg bilaterally        Data    Radiology Imagin.  Via care everywhere left knee x-ray from 2024 shows mild arthritis no fractures.  2.  Via Care Everywhere x-rays of the right hip, left tib-fib and left ankle did  not show fractures.    ASSESSMENT AND PLAN:  1. Sprain of left knee, unspecified ligament, initial encounter  She seems to have sprained her knee on top of chronic underlying osteoarthritis.  I recommending a 7-day prednisone taper followed by Mobic.  I will give her tramadol for intermittent pain.  Return in 3 weeks for reassessment.  - traMADol 50 MG Oral Tab; Take 1 tablet (50 mg total) by mouth every 12 (twelve) hours as needed for Pain.  Dispense: 60 tablet; Refill: 0          The patient was in agreement with the assessment and plan.  All questions were answered.        Valentin Cantu MD  Physical Medicine and Rehabilitation/Sports Medicine  [facility]

## 2024-05-02 ENCOUNTER — MED REC SCAN ONLY (OUTPATIENT)
Dept: PHYSICAL MEDICINE AND REHAB | Facility: CLINIC | Age: 49
End: 2024-05-02

## 2024-05-06 ENCOUNTER — TELEPHONE (OUTPATIENT)
Dept: PHYSICAL THERAPY | Facility: HOSPITAL | Age: 49
End: 2024-05-06

## 2024-05-06 ENCOUNTER — APPOINTMENT (OUTPATIENT)
Dept: PHYSICAL THERAPY | Facility: HOSPITAL | Age: 49
End: 2024-05-06
Attending: PHYSICAL MEDICINE & REHABILITATION
Payer: MEDICARE

## 2024-05-06 NOTE — TELEPHONE ENCOUNTER
Phoned pt due to failure to show for her physical therapy appt. Pt states she was in too much pain due to a recent fall and meant to call us. Confirmed next visit

## 2024-05-13 ENCOUNTER — TELEMEDICINE (OUTPATIENT)
Dept: SURGERY | Facility: CLINIC | Age: 49
End: 2024-05-13
Payer: MEDICARE

## 2024-05-13 VITALS — BODY MASS INDEX: 37 KG/M2 | WEIGHT: 227 LBS

## 2024-05-13 DIAGNOSIS — E66.9 OBESITY (BMI 30-39.9): ICD-10-CM

## 2024-05-13 DIAGNOSIS — R73.03 PREDIABETES: Primary | ICD-10-CM

## 2024-05-13 DIAGNOSIS — E78.5 DYSLIPIDEMIA: ICD-10-CM

## 2024-05-13 NOTE — PROGRESS NOTES
Virtual Video & Audio Check-In    Suzie Ferris verbally consents to a Virtual Video & Audio Check-In visit on 24.  Patient has been referred to the [facility] website at [email or web address] to review the yearly Consent to Treat document.    Patient understands and accepts financial responsibility for any deductible, co-insurance and/or co-pays associated with this service.    Duration of the service: 9 minutes.    Summary of topics discussed: Obesity/weight management, Lifestyle and behavior modifications, Medication management.    Patient:  Suzie Ferris  :      1975  MRN:      [id number]    Chief Complaint:    Chief Complaint   Patient presents with    Follow - Up    Obesity    Weight Management       SUBJECTIVE     History of Present Illness:  Suzie is being seen today for a follow-up for non-surgical weight loss.    Decreased dose of Qsymia not effective.     Fell since last visit- ongoing pain. Limited exercise.       Past Medical History:   Past Medical History:    Anemia    Anxiety    Colon polyp    None in . REpeat CLN in .    Herpes simplex type 1 infection    Hyperlipidemia    Menorrhagia    Morbid obesity with BMI of 40.0-44.9, adult (HCC)    Obesity      Comorbidities:  Dyslipidemia     OBJECTIVE     Vitals: Wt 227 lb (103 kg)   BMI 36.64 kg/m²     Initial weight loss: -00   Total weight loss: -21   Start weight: 248    Wt Readings from Last 3 Encounters:   24 227 lb (103 kg)   24 227 lb (103 kg)   01/15/24 227 lb (103 kg)       Patient Medications:    Current Outpatient Medications   Medication Sig Dispense Refill    predniSONE 10 MG Oral Tab Take 7 tablets today,take 6 tablets tomorrow, then decrease number of tablets by one tablet each of the remaining days 28 tablet 0    Meloxicam 15 MG Oral Tab Take 1 tablet (15 mg total) by mouth daily. 30 each 0    traMADol 50 MG Oral Tab Take 1 tablet (50 mg total) by mouth every 12 (twelve) hours as needed for Pain. 60  tablet 0    rosuvastatin 5 MG Oral Tab Take 1 tablet (5 mg total) by mouth nightly.      traZODone 50 MG Oral Tab TAKE 1/2 TABLET BY MOUTH NIGHTLY AS NEEDED FOR SLEEP/AGITATION. MAY INCREASE TO A FULL TABLET IF TOLERATED      valACYclovir 500 MG Oral Tab       ibuprofen 600 MG Oral Tab Take 1 tablet (600 mg total) by mouth every 6 (six) hours as needed for Pain.      ALPRAZolam 0.5 MG Oral Tab Take 1 tablet (0.5 mg total) by mouth nightly as needed.      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 GENTLY BLOW NOSE PRIOR TO USE       Allergies:  Patient has no known allergies.     Social History: Reviewed    Surgical History:    Past Surgical History:   Procedure Laterality Date    Colonoscopy N/A 9/24/2019    Procedure: COLONOSCOPY;  Surgeon: POLLO Holland MD;  Location: [facility] ENDOSCOPY    D & c      x 2    Salpingectomy Bilateral 03/2017    Tubal ligation       Family History:    Family History   Problem Relation Age of Onset    Diabetes Mother     Hypertension Mother     Diabetes Sister     Diabetes Father     Other (Other) Father        Food Journal  Reviewed and Discussed:       Patient has a Food Journal?: no   Patient is reading nutrition labels?  yes  Average Caloric Intake:     Average CHO Intake:   Is patient exercising? yes less due to knee pain, fall   Type of exercise? Walking    Eating Habits  Patient states the following:  Eats 3 meal(s) per day  Length of time it takes to consume a meal:    # of snacks per day: as stated below   Type of snacks: as stated below   Amount of soda consumption per day:  Rare   Amount of water (in ounces) per day: adequate- adds lemon/lime  Toughest challenge:  knee pain, recent fall     Nutritional Goals  Eat 3-4 cups of fresh fruits or vegetables daily    Behavior Modifications Reviewed and Discussed  Eat breakfast, Eat 3 meals per day, Plan meals in advance, Read nutrition labels,  Drink 64 oz of water per day, Maintain a daily food journal, No drinking 30 minutes before or after meals, Utlize portion control strategies to reduce calorie intake, Identify triggers for eating and manage cues and Eat slowly and take 20 to 30 minutes to complete each meal    Exercise Goals Reviewed and Discussed    Aim for 150 minutes moderate level exercise weekly with 2-3 days strength training as tolerated     ROS:    Review of Systems   Constitutional: Negative.    Respiratory: Negative.     Cardiovascular: Negative.    Gastrointestinal: Negative.    Genitourinary: Negative.    Musculoskeletal: Negative.    Skin: Negative.    Neurological: Negative.        Physical Exam:   General appearance: alert, appears stated age, cooperative and obese  Head: Normocephalic, without obvious abnormality, atraumatic  Neck: no adenopathy, no carotid bruit, no JVD, supple, symmetrical, trachea midline and thyroid not enlarged, symmetric, no tenderness/mass/nodules  Lungs: clear to auscultation bilaterally  Heart: S1, S2 normal, no murmur, click, rub or gallop, regular rate and rhythm  Abdomen: soft, non-tender; bowel sounds normal; no masses,  no organomegaly and abdomen obese   Extremities: intact, no edema   Pulses: 2+ and symmetric  Skin: intact   Neurologic: Grossly normal    ASSESSMENT     Encounter Diagnosis(ses):   Encounter Diagnoses   Name Primary?    Prediabetes Yes    Obesity (BMI 30-39.9)     Dyslipidemia          PLAN       Iron deficiency anemia: Continue supplement.    DYSLIPIDEMIA: Improved on statin. Recommend dietary changes and lifestyle modifications as discussed below. Monitor.       Lab Results   Component Value Date/Time    CHOLEST 173 01/09/2024 07:45 AM    LDL 73 01/09/2024 07:45 AM    HDL 91 (H) 01/09/2024 07:45 AM    TRIG 44 01/09/2024 07:45 AM    VLDL 7 01/09/2024 07:45 AM     OBESITY:  PREDIABETES:    Goals for next month:  1. Keep a food log.  2. Drink 48-64 ounces of non-caloric beverages per  day. No fruit juices or regular soda.  3. Increase activity-upper body exercises, walk 10 minutes per day.  4. Increase fruit and vegetable servings to 5-6 per day.      Reviewed other labs:  1/25/23- CMP, CBC in range.  7/25/23- a1C 5.8.  9/14/23- fasting insulin 11.5. a1c 5.9.  O/s labs 7/24/23- a1c 5.8. Fasting insulin improved to 6.6.  1/19/24- CMP in range.  3/8/24- glucose elevated. CBC, ferritin in range.     Denies personal or family hx medullary thyroid CA, endocrine neoplasia syndrome, pancreatitis hx, suicidal ideation. No renal impairment, severe GI disease, diabetes, pancreatitis risks noted.    Wegovy not covered.   Consider compounded semaglutide with Dr. Draper.     Unable to tolerate Qsymia 7.5-46 mg in the AM- palpitations.   Reduced dose 3.75-23 mg in the AM ineffective.   Consider Contrave.  Discussed risks, benefits, and side effects of medication.    Abnormal EKG 9/14/23- s/p additional EKG & cardiology evaluation- [place] Dr. Tapia.  Recommendation: Consider initiation of Qysmia; no contraindications; monitor for hypertension and palpitations   1/19/24- EKG done.     80 grams protein/day.  30-40 grams fiber/day.  3 PM handful nuts.     Continue IF.    Follow up with Dr. Draper.     ANDRIY Sanders

## 2024-05-15 ENCOUNTER — OFFICE VISIT (OUTPATIENT)
Dept: PHYSICAL THERAPY | Facility: HOSPITAL | Age: 49
End: 2024-05-15
Attending: PHYSICAL MEDICINE & REHABILITATION
Payer: MEDICARE

## 2024-05-15 ENCOUNTER — TELEMEDICINE (OUTPATIENT)
Dept: PHYSICAL MEDICINE AND REHAB | Facility: CLINIC | Age: 49
End: 2024-05-15

## 2024-05-15 DIAGNOSIS — S83.92XA SPRAIN OF LEFT KNEE, UNSPECIFIED LIGAMENT, INITIAL ENCOUNTER: Primary | ICD-10-CM

## 2024-05-15 DIAGNOSIS — M47.816 LUMBAR SPONDYLOSIS: ICD-10-CM

## 2024-05-15 PROCEDURE — 97530 THERAPEUTIC ACTIVITIES: CPT

## 2024-05-15 PROCEDURE — 99214 OFFICE O/P EST MOD 30 MIN: CPT | Performed by: PHYSICAL MEDICINE & REHABILITATION

## 2024-05-15 PROCEDURE — 97110 THERAPEUTIC EXERCISES: CPT

## 2024-05-15 RX ORDER — CYCLOBENZAPRINE HCL 10 MG
10 TABLET ORAL NIGHTLY
Qty: 30 TABLET | Refills: 0 | Status: SHIPPED | OUTPATIENT
Start: 2024-05-15 | End: 2024-06-14

## 2024-05-15 NOTE — PROGRESS NOTES
Progress Summary  Pt has attended 5 visits in Physical Therapy from 4/3 - 5/15/2024. She has only been seen 1x since her last appt with you due to cxl due to pain following her fall.    Diagnosis:   Lumbar spondylosis (M47.816)        Referring Provider: Valentin Cantu  Date of Evaluation: 4/3/2024    Precautions:  None Next MD visit: none scheduled  Date of Surgery: n/a     Subjective: Pt states she fell on 4/29. She was walking into the mall and slipped and fell, landing on her L knee and R side. She states she was starting to feel better in therapy, but now has increased pain in her lumbar region = 7-10/10. Pain is worse than prior to starting therapy, with pain more generalized in her entire lumbar region and more constant.  She also c/o intermittent L ankle pain with WB, and she c/o generalized (B) LE aching to the ankles = 7-10/10 aggravated by walking and standing. She just started the prednisone dose pack; she has been taking gabapentin but not regularly, which she thinks is helping. Pt tearful upon arriving to therapy today. Pt states she has not done her HEP since she fell.  Current Pain: 10/10  B lumbar stiffness  Objective/Goals: Pt seen 1x since she fell due to cxl.     Assessment: Pt had originally progressed well and we had decreased frequency in preparation for possible discharge if she continued to do well. Presently, symptoms remain exacerbated since she fell. She was able to tolerate resuming gentle stretching today without provocation.    Plan: Resume 1x/wk with progressive stabilization, mobility and flexibility and general conditioning.    Today's Rx:  Pt arrived 15' min late for appt.   4/10/2024   Visit: 2  4/17/2024   3  4/24/2024   4   5/15/2024   5   HEP Post pelvic tilt  SKC  DKC  Piriformis stretch  LTR  Clam  TA Standing hip flexor stretch  Bridge  Abd sidelying  Standing rectus stretch  Pt advised to stretch prior to leaving health club  90/90 hand on knee    Seated rectus stretch    Therapeutic Exercises Post pelvic tilt x 3  SKC x 1  DKC x 2  Piriformis stretch x 1  LTR x 3  Clam x 10  TA retraining  35'  Standing hip flexor stretch x 3  Bridge x 10  Abd 10 x 2  NuStep seat and arms 12, L5 x 6'   30'  X 1   Rectus stretch x 1  Bridge x 10  Abd x 10  NuStep x 8'  15'  Seated rectus stretch x 2  Standing hip flexor stretch x 3  LTR x 3  SKC x 3  Piriformis stretch x 2  DKC  30'   Manual       Neuro ReEducation     Heel slide  Bent knee fall out  10'  X   X  90/90 with hand on knee  15'    Therapeutic Activity Inst in back protection techniques for bending and lifting with application to household chores and lifting children at work  10'  Review for lifting children at work  Inst in when to incorporated which ex's for relief from pain and stiffness  5'   Inst in gradual progressive walking program  Inst in TA for activities that are provoking  10'   Modalities         HEP to date: post pelvic tilt, SKC, DKC, LTR, piriformis stretch, clam, abd, bridge, TA, heel slide, 90/90, bent knee fall out, standing hip flexor stretch, rectus stretch    Education:         Insurance Primary/Secondary: MEDICARE / N/A       # Auth Visits: 12   Total Timed Treatment: 40 min  Date POC Expires: 7/2   Total Treatment time: 40 min       Charges: There Ex 2, There Act 1       Treatment Number: 5

## 2024-05-15 NOTE — PROGRESS NOTES
[facility]    Telemedicine Visit - Follow-up Evaluation    Suzie Ferris verbally consents to a Telemedicine Visit on 05/15/24. This visit is conducted using Telemedicine with live, interactive audio and video.    Patient understands and accepts financial responsibility for any deductible, co-insurance and/or co-pays associated with this service. The patient understands they can stop the telemedicine visit at any time.    CHIEF COMPLAINT:  Left leg pain    History of Present Illness:  The patient is a 49 year old. female who presents for follow up.  Since her last visit, she is no better, she states her whole body hurts.  Last visit on April 30 I prescribed her 7 days of prednisone followed by Mobic.  I also gave her tramadol for knee sprain.  She did not take any medicine until this past Monday, thinking that if she waited it would all go away.  She only had 1 session of physical therapy.  She takes tramadol at night to help her sleep.  She is disappointed that she is not better.  She complains of pain in her knee, ankle and low back, the same as previously.        PAST MEDICAL HISTORY:     Past Medical History:    Anemia    Anxiety    Colon polyp    None in 2022. REpeat CLN in 2032.    Herpes simplex type 1 infection    Hyperlipidemia    Menorrhagia    Morbid obesity with BMI of 40.0-44.9, adult (HCC)    Obesity         PAST SURGICAL HISTORY:     Past Surgical History:   Procedure Laterality Date    Colonoscopy N/A 9/24/2019    Procedure: COLONOSCOPY;  Surgeon: POLLO Holland MD;  Location: [facility] ENDOSCOPY    D & c      x 2    Salpingectomy Bilateral 03/2017    Tubal ligation           CURRENT MEDICATIONS:     Current Outpatient Medications   Medication Sig Dispense Refill    cyclobenzaprine 10 MG Oral Tab Take 1 tablet (10 mg total) by mouth nightly. 30 tablet 0    predniSONE 10 MG Oral Tab Take 7 tablets today,take 6 tablets tomorrow, then decrease number of  tablets by one tablet each of the remaining days 28 tablet 0    Meloxicam 15 MG Oral Tab Take 1 tablet (15 mg total) by mouth daily. 30 each 0    traMADol 50 MG Oral Tab Take 1 tablet (50 mg total) by mouth every 12 (twelve) hours as needed for Pain. 60 tablet 0    rosuvastatin 5 MG Oral Tab Take 1 tablet (5 mg total) by mouth nightly.      traZODone 50 MG Oral Tab TAKE 1/2 TABLET BY MOUTH NIGHTLY AS NEEDED FOR SLEEP/AGITATION. MAY INCREASE TO A FULL TABLET IF TOLERATED      valACYclovir 500 MG Oral Tab       ibuprofen 600 MG Oral Tab Take 1 tablet (600 mg total) by mouth every 6 (six) hours as needed for Pain.      ALPRAZolam 0.5 MG Oral Tab Take 1 tablet (0.5 mg total) by mouth nightly as needed.      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 GENTLY BLOW NOSE PRIOR TO USE           ALLERGIES:   No Known Allergies      FAMILY HISTORY:     Family History   Problem Relation Age of Onset    Diabetes Mother     Hypertension Mother     Diabetes Sister     Diabetes Father     Other (Other) Father           SOCIAL HISTORY:     Social History     Socioeconomic History    Marital status: Single   Tobacco Use    Smoking status: Never    Smokeless tobacco: Never   Vaping Use    Vaping status: Never Used   Substance and Sexual Activity    Alcohol use: Yes     Comment: rarely    Drug use: No    Sexual activity: Yes     Partners: Male     Social Determinants of Health      Received from [facility], [facility]    Social Connections    Received from [facility], [facility]    Housing Stability            PHYSICAL EXAM:   Constitutional: Healthy appearing, well-developed, no acute distress  Eyes: Conjunctivae are clear, extra-occular movements intact  Ears/Nose/Mouth/Throat:  External inspection identifies normal appearance without obvious deformity  Respiratory:  Non-labored respirations  Neurological: alert & oriented x 3, attentive, able to follow commands, comprehention intact, receptive and expressive speech intact, cranial nerves III-XII intact  Psychiatric: Mood and affect appropriate, no agitation        IMAGIN.  Via care everywhere left knee x-ray from 2024 shows mild arthritis no fractures.  2.  Via Care Everywhere x-rays of the right hip, left tib-fib and left ankle did not show fractures.      ASSESSMENT AND PLAN:   1. Sprain of left knee, unspecified ligament, initial encounter  Unfortunately she is no better.  She really has not started treatment until a few days ago.  I recommended she give the prednisone and physical therapy 2 weeks to work then come back for follow-up in person.  In the meantime I also added Flexeril at night.    2. Lumbar spondylosis  Stable        Follow-up: 2 weeks    We discussed that a telemedicine visit is in place of an office visit; however, this limits the ability to perform a thorough physical examination which may affect objective findings related to a specific condition and can affect treatment.      The patient verbalized understanding with this plan and was in agreement.  There are no barriers to learning.  All questions were answered.      Valentin Cantu MD  Physical Medicine and Rehabilitation/Sports Medicine  [facility]

## 2024-05-20 ENCOUNTER — OFFICE VISIT (OUTPATIENT)
Dept: PHYSICAL THERAPY | Facility: HOSPITAL | Age: 49
End: 2024-05-20
Attending: PHYSICAL MEDICINE & REHABILITATION
Payer: MEDICARE

## 2024-05-20 PROCEDURE — 97112 NEUROMUSCULAR REEDUCATION: CPT

## 2024-05-20 PROCEDURE — 97110 THERAPEUTIC EXERCISES: CPT

## 2024-05-20 PROCEDURE — 97530 THERAPEUTIC ACTIVITIES: CPT

## 2024-05-20 NOTE — PROGRESS NOTES
Diagnosis:   Lumbar spondylosis (M47.816)        Referring Provider: Valentin Cantu  Date of Evaluation: 4/3/2024    Precautions:  None Next MD visit: none scheduled  Date of Surgery: n/a     Subjective: Pt states she is taking the steroid dose pack with slight improvement, and she started the muscle relaxant before bed which is helping her sleep. She had her telehealth visit and is to cont PT and return to Dr. Cantu in 2 wks. Over the weekend she had pain in (B) lumbar and (B)lateral thighs, (R) buttock and posterior thigh today, intermittent in L ankle and knee. She did try walking approx 800' with slight increase in pain following.  Pt tearful upon arrival to PT.  Current Pain: 6/10    Objective/Goals:   Rx: flow chart  SLR negative (B)    Assessment: Pt able to perform gentle stretching without provocation, but she had limited tolerance for lying supine on the mat table.    Plan: Cont 1x/wk with progressive stabilization, mobility and flexibility and general conditioning.    Today's Rx:  Pt arrived 15' min late for appt.   4/10/2024   Visit: 2  4/17/2024   3  4/24/2024   4   5/15/2024   5  5/20/2024   6   HEP Post pelvic tilt  SKC  DKC  Piriformis stretch  LTR  Clam  TA Standing hip flexor stretch  Bridge  Abd sidelying  Standing rectus stretch  Pt advised to stretch prior to leaving health club  90/90 hand on knee    Seated rectus stretch To resume TA, clam   Therapeutic Exercises Post pelvic tilt x 3  SKC x 1  DKC x 2  Piriformis stretch x 1  LTR x 3  Clam x 10  TA retraining  35'  Standing hip flexor stretch x 3  Bridge x 10  Abd 10 x 2  NuStep seat and arms 12, L5 x 6'   30'  X 1   Rectus stretch x 1  Bridge x 10  Abd x 10  NuStep x 8'  15'  Seated rectus stretch x 2  Standing hip flexor stretch x 3  LTR x 3  SKC x 3  Piriformis stretch x 2  DKC  30'  LTR x 3   SKC x 3   Piriformis stretch x 3  NuStep seat 12, arms 12 for half, L4 x 3 min  20'   Manual     The Stick to B lateral and ant thighs - pt  performed   Neuro ReEducation     Heel slide  Bent knee fall out  10'  X   X  90/90 with hand on knee  15'  TA retraining  Heel slide with heel on table x 2  TNE: educated regarding importance of continuing to move painful areas to reduce increased nerve sensitivity.  10'   Therapeutic Activity Inst in back protection techniques for bending and lifting with application to household chores and lifting children at work  10'  Review for lifting children at work  Inst in when to incorporated which ex's for relief from pain and stiffness  5'   Inst in gradual progressive walking program  Inst in TA for activities that are provoking  10'  Gait training to decrease guarding and restore normal arm swing and shoulder/thoracic spine position/motion. Discussed gradually resuming normal pace of gait.  10'   Modalities     HP to back     HEP to date: post pelvic tilt, SKC, DKC, LTR, piriformis stretch, clam, abd, bridge, TA, heel slide, 90/90, bent knee fall out, standing hip flexor stretch, rectus stretch    Education:   appropriate use and potential benefits of use of moist heat and CP.        Insurance Primary/Secondary: MEDICARE / N/A       # Auth Visits: 12   Total Timed Treatment: 40 min  Date POC Expires: 7/2   Total Treatment time: 40 min       Charges: There Ex 1, There Act 1, NM Re-ed 1       Treatment Number: 6

## 2024-05-29 ENCOUNTER — OFFICE VISIT (OUTPATIENT)
Dept: SURGERY | Facility: CLINIC | Age: 49
End: 2024-05-29

## 2024-05-29 VITALS
WEIGHT: 225 LBS | SYSTOLIC BLOOD PRESSURE: 130 MMHG | HEART RATE: 79 BPM | OXYGEN SATURATION: 99 % | DIASTOLIC BLOOD PRESSURE: 80 MMHG | HEIGHT: 65.5 IN | BODY MASS INDEX: 37.04 KG/M2

## 2024-05-29 DIAGNOSIS — E78.5 DYSLIPIDEMIA: ICD-10-CM

## 2024-05-29 DIAGNOSIS — Z51.81 ENCOUNTER FOR THERAPEUTIC DRUG MONITORING: ICD-10-CM

## 2024-05-29 DIAGNOSIS — R73.03 PREDIABETES: Primary | ICD-10-CM

## 2024-05-29 DIAGNOSIS — E66.9 OBESITY (BMI 30-39.9): ICD-10-CM

## 2024-05-29 PROCEDURE — 99214 OFFICE O/P EST MOD 30 MIN: CPT | Performed by: INTERNAL MEDICINE

## 2024-05-29 NOTE — PATIENT INSTRUCTIONS
[facility]  [address], [address]  [place] [address]    Phone  Phone: [phone]    Fax  Fax: [phone]    Hours  Pharmacy: Mon - Fri 7:30AM - 7:30PM    Call Center: Mon - Fri 7:00AM - 7:00PM

## 2024-05-29 NOTE — PROGRESS NOTES
Patient:  Suzie Ferris  :      1975  MRN:      [id number]    Chief Complaint:    Chief Complaint   Patient presents with    Follow - Up    Weight Management       SUBJECTIVE     History of Present Illness:  Suzie is being seen today for a follow-up for non-surgical weight loss.      Difficulty tolerating Qsymia- palpitations.     Ongoing knee pain.     Past Medical History:   Past Medical History:    Anemia    Anxiety    Colon polyp    None in . REpeat CLN in .    Herpes simplex type 1 infection    Hyperlipidemia    Menorrhagia    Morbid obesity with BMI of 40.0-44.9, adult (HCC)    Obesity      Comorbidities:  Dyslipidemia     OBJECTIVE     Vitals: /80   Pulse 79   Ht 5' 5.5\" (1.664 m)   Wt 225 lb (102.1 kg)   SpO2 99%   BMI 36.87 kg/m²     Initial weight loss: -02   Total weight loss: -23   Start weight: 248    Wt Readings from Last 3 Encounters:   24 225 lb (102.1 kg)   24 227 lb (103 kg)   24 227 lb (103 kg)       Patient Medications:    Current Outpatient Medications   Medication Sig Dispense Refill    cyclobenzaprine 10 MG Oral Tab Take 1 tablet (10 mg total) by mouth nightly. 30 tablet 0    predniSONE 10 MG Oral Tab Take 7 tablets today,take 6 tablets tomorrow, then decrease number of tablets by one tablet each of the remaining days 28 tablet 0    Meloxicam 15 MG Oral Tab Take 1 tablet (15 mg total) by mouth daily. 30 each 0    traMADol 50 MG Oral Tab Take 1 tablet (50 mg total) by mouth every 12 (twelve) hours as needed for Pain. 60 tablet 0    rosuvastatin 5 MG Oral Tab Take 1 tablet (5 mg total) by mouth nightly.      traZODone 50 MG Oral Tab TAKE 1/2 TABLET BY MOUTH NIGHTLY AS NEEDED FOR SLEEP/AGITATION. MAY INCREASE TO A FULL TABLET IF TOLERATED      valACYclovir 500 MG Oral Tab       ibuprofen 600 MG Oral Tab Take 1 tablet (600 mg total) by mouth every 6 (six) hours as needed for Pain.      ALPRAZolam 0.5 MG Oral Tab Take 1 tablet (0.5 mg total) by  mouth nightly as needed.      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 GENTLY BLOW NOSE PRIOR TO USE       Allergies:  Patient has no known allergies.     Social History: Reviewed    Surgical History:    Past Surgical History:   Procedure Laterality Date    Colonoscopy N/A 9/24/2019    Procedure: COLONOSCOPY;  Surgeon: POLLO Holland MD;  Location: [facility] ENDOSCOPY    D & c      x 2    Salpingectomy Bilateral 03/2017    Tubal ligation       Family History:    Family History   Problem Relation Age of Onset    Diabetes Mother     Hypertension Mother     Diabetes Sister     Diabetes Father     Other (Other) Father        Food Journal  Reviewed and Discussed:       Patient has a Food Journal?: no   Patient is reading nutrition labels?  yes  Average Caloric Intake:     Average CHO Intake:   Is patient exercising? yes less due to knee pain  Type of exercise? Walking    Eating Habits  Patient states the following:  Eats 3 meal(s) per day  Length of time it takes to consume a meal:    # of snacks per day: as stated below   Type of snacks: as stated below   Amount of soda consumption per day:  Rare   Amount of water (in ounces) per day: adequate- adds lemon/lime  Toughest challenge:  knee pain, medication side effects     Nutritional Goals  Eat 3-4 cups of fresh fruits or vegetables daily    Behavior Modifications Reviewed and Discussed  Eat breakfast, Eat 3 meals per day, Plan meals in advance, Read nutrition labels, Drink 64 oz of water per day, Maintain a daily food journal, No drinking 30 minutes before or after meals, Utlize portion control strategies to reduce calorie intake, Identify triggers for eating and manage cues and Eat slowly and take 20 to 30 minutes to complete each meal    Exercise Goals Reviewed and Discussed    Aim for 150 minutes moderate level exercise weekly with 2-3 days strength training    ROS:    Review of  Systems   Constitutional: Negative.    Respiratory: Negative.     Cardiovascular: Negative.    Gastrointestinal: Negative.    Genitourinary: Negative.    Musculoskeletal: Negative.    Skin: Negative.    Neurological: Negative.        Physical Exam:   General appearance: alert, appears stated age, cooperative and obese  Head: Normocephalic, without obvious abnormality, atraumatic  Neck: no adenopathy, no carotid bruit, no JVD, supple, symmetrical, trachea midline and thyroid not enlarged, symmetric, no tenderness/mass/nodules  Lungs: clear to auscultation bilaterally  Heart: S1, S2 normal, no murmur, click, rub or gallop, regular rate and rhythm  Abdomen: soft, non-tender; bowel sounds normal; no masses,  no organomegaly and abdomen obese   Extremities: intact, no edema   Pulses: 2+ and symmetric  Skin: intact   Neurologic: Grossly normal    ASSESSMENT     Encounter Diagnosis(ses):   Encounter Diagnoses   Name Primary?    Prediabetes Yes    Dyslipidemia     Encounter for therapeutic drug monitoring     Obesity (BMI 30-39.9)        PLAN       Iron deficiency anemia: Continue supplement.    DYSLIPIDEMIA: Improved on statin. Recommend dietary changes and lifestyle modifications as discussed below. Monitor.       Lab Results   Component Value Date/Time    CHOLEST 173 01/09/2024 07:45 AM    LDL 73 01/09/2024 07:45 AM    HDL 91 (H) 01/09/2024 07:45 AM    TRIG 44 01/09/2024 07:45 AM    VLDL 7 01/09/2024 07:45 AM     OBESITY:  PREDIABETES:    Goals for next month:  1. Keep a food log.  2. Drink 48-64 ounces of non-caloric beverages per day. No fruit juices or regular soda.  3. Increase activity-upper body exercises, walk 10 minutes per day.  4. Increase fruit and vegetable servings to 5-6 per day.      Reviewed labs:  1/25/23- CMP, CBC in range.  7/25/23- a1C 5.8.  9/14/23- fasting insulin 11.5. a1c 5.9.  O/s labs 7/24/23- A1c 5.8. Fasting insulin improved to 6.6.  Update fasting CMP at this time.     Denies personal or  family hx medullary thyroid CA, endocrine neoplasia syndrome, pancreatitis hx, suicidal ideation. No renal impairment, severe GI disease, diabetes, pancreatitis risks noted.    Wegovy not covered.     Will start Semiglutide    Compound semaglutide is a custom-made medication that mimics the GLP-1 hormone. It is used to treat type 2 diabetes and lower the risk of heart or blood vessel disease. It works by increasing insulin release, lowering glucagon release, delaying gastric emptying and reducing appetite. Compound semaglutide is prescribed when an FDA-approved medication, dose, or dosage form is unavailable (ie. Nationwide shortage or no obesity coverage for GLP-1 meds). Patients are aware of the difference between these medications.  Cost of these medications is  dollars monthly based on dose.      Unable to tolerate Qsymia 7.5-46 mg in the AM- palpitations. Reduce dose to 3.75-23 mg in the AM every other day. Increase dose to daily as tolerated.   Consider Contrave if unable to tolerate reduced dose of Qsymia.  Discussed risks, benefits, and side effects of medication.    Abnormal EKG 9/14/23- s/p additional EKG & cardiology evaluation- [facility] Dr. Tapia.  Recommendation: Consider initiation of Qysmia; no contraindications; monitor for hypertension and palpitations     80 grams protein/day.  30-40 grams fiber/day.  3 PM handful nuts.     Continue IF.    Damaso Draper MD

## 2024-05-31 ENCOUNTER — OFFICE VISIT (OUTPATIENT)
Dept: PHYSICAL THERAPY | Facility: HOSPITAL | Age: 49
End: 2024-05-31
Attending: PHYSICAL MEDICINE & REHABILITATION
Payer: MEDICARE

## 2024-05-31 PROCEDURE — 97110 THERAPEUTIC EXERCISES: CPT

## 2024-05-31 PROCEDURE — 97140 MANUAL THERAPY 1/> REGIONS: CPT

## 2024-05-31 PROCEDURE — 97112 NEUROMUSCULAR REEDUCATION: CPT

## 2024-05-31 NOTE — PROGRESS NOTES
Progress Summary  Pt has attended 7 visits in Physical Therapy from 4/3 - 5/31/2024.  She has been seen 2x since her Telehealth visit with you.  .   Diagnosis:   Lumbar spondylosis (M47.816)        Referring Provider: Valentin Cantu  Date of Evaluation: 4/3/2024    Precautions:  None Next MD visit: none scheduled  Date of Surgery: n/a     Subjective: Prior to falling, pt was reporting a significant decrease in pain and increase in functional activity tolerance, and she was planning to decrease frequency of PT in preparation for discharge with HEP.    However, she continues to report significant pain since she fell, including:  (B) lower lumbar pain = 4-10/10, aggravated by bending during housework  (B) dull, aching in anterior/lateral thighs without pattern, which has been improving  (L) knee to ankle intermittent pain = 0-8/10 without pattern, occurring approximately 2x/wk. She reports one instance of L knee buckling during amb without falling.  Pt states she feels the prednisone provided slight improvement.  She continues to take the muscle relaxant before bed and Tramadol during the day, and she feels this is helping. in pain.   Current Pain: 6/10 lower lumbar 7/10  Objective/Goals:   Pt has been seen for stretching and stabilization as tolerated.  (B) SLR negative  (L) SLR provokes L lumbar pain at 50 degrees, (L) Knee to chest provokes lumbar pain at 80 degrees  Slump test negative   Palpation: increased resting tone and TTP in (B) lumbar paraspinals, most notable in lower lumbar    Assessment: Pt    Plan: Cont 1x/wk with progressive stabilization, mobility and flexibility and general conditioning.    Today's Rx:  Pt arrived 15' min late for appt.   4/10/2024   Visit: 2  4/17/2024   3  4/24/2024   4   5/15/2024   5  5/20/2024   6  5/31/2024   7   HEP Post pelvic tilt  SKC  DKC  Piriformis stretch  LTR  Clam  TA Standing hip flexor stretch  Bridge  Abd sidelying  Standing rectus stretch  Pt advised to stretch  prior to leaving health club  90/90 hand on knee    Seated rectus stretch To resume TA, clam  To resume abdominal stab supine as bernardino with sup march   Therapeutic Exercises Post pelvic tilt x 3  SKC x 1  DKC x 2  Piriformis stretch x 1  LTR x 3  Clam x 10  TA retraining  35'  Standing hip flexor stretch x 3  Bridge x 10  Abd 10 x 2  NuStep seat and arms 12, L5 x 6'   30'  X 1   Rectus stretch x 1  Bridge x 10  Abd x 10  NuStep x 8'  15'  Seated rectus stretch x 2  Standing hip flexor stretch x 3  LTR x 3  SKC x 3  Piriformis stretch x 2  DKC  30'  LTR x 3   SKC x 3   Piriformis stretch x 3  NuStep seat 12, arms 12 for half, L4 x 3 min  20'  X   X    X  DKC x 2  Post pelvic tilt  NuStep x 5'   20'   Manual     The Stick to B lateral and ant thighs - pt performed STM/MFR (B) lumbar paraspinals and QL as tolerated  10'   Neuro ReEducation     Heel slide  Bent knee fall out  10'  X   X  90/90 with hand on knee  15'  TA retraining  Heel slide with heel on table x 2  TNE: educated regarding importance of continuing to move painful areas to reduce increased nerve sensitivity.  10'  X   Supine mini march  10'     Therapeutic Activity Inst in back protection techniques for bending and lifting with application to household chores and lifting children at work  10'  Review for lifting children at work  Inst in when to incorporated which ex's for relief from pain and stiffness  5'   Inst in gradual progressive walking program  Inst in TA for activities that are provoking  10'  Gait training to decrease guarding and restore normal arm swing and shoulder/thoracic spine position/motion. Discussed gradually resuming normal pace of gait.  10'    Modalities     HP to back      HEP to date: post pelvic tilt, SKC, DKC, LTR, piriformis stretch, clam, abd, bridge, TA, heel slide, 90/90, bent knee fall out, standing hip flexor stretch, rectus stretch    Education:   appropriate use and potential benefits of use of moist heat and  CP.        Insurance Primary/Secondary: MEDICARE / N/A       # Auth Visits: 12   Total Timed Treatment: 40 min  Date POC Expires: 7/2   Total Treatment time: 45 min       Charges: There Ex 1, Man Ther 1, NM Re-ed 1       Treatment Number: 7

## 2024-06-05 ENCOUNTER — TELEPHONE (OUTPATIENT)
Dept: PHYSICAL THERAPY | Facility: HOSPITAL | Age: 49
End: 2024-06-05

## 2024-06-07 ENCOUNTER — OFFICE VISIT (OUTPATIENT)
Dept: PHYSICAL THERAPY | Facility: HOSPITAL | Age: 49
End: 2024-06-07
Attending: PHYSICAL MEDICINE & REHABILITATION
Payer: MEDICARE

## 2024-06-07 PROCEDURE — 97110 THERAPEUTIC EXERCISES: CPT

## 2024-06-07 PROCEDURE — 97112 NEUROMUSCULAR REEDUCATION: CPT

## 2024-06-07 PROCEDURE — 97140 MANUAL THERAPY 1/> REGIONS: CPT

## 2024-06-07 NOTE — PROGRESS NOTES
Diagnosis:   Lumbar spondylosis (M47.816)        Referring Provider: Valentin Cantu  Date of Evaluation: 4/3/2024    Precautions:  None Next MD visit: 7/3  Date of Surgery: n/a     Subjective:  Pt reports pain in the mid lumbar paraspinal region which is worse with sitting. Pt states symptoms fluctuate daily but overall she feels she is going in the right direction. She reports significant relief following the last rx, stating she did not need to take the muscle relaxant at bedtime.   Current Pain: 5/10 lower lumbar   Objective/Goals:  Rx: flow chart    Assessment: No stretch felt with SKC or DKC. Able to bernardino LTR and piriformis stretch well. Difficulty with stabilization    Plan: Cont 1x/wk with progressive stabilization, mobility and flexibility and general conditioning.    Today's Rx:    4/10/2024   Visit: 2  4/17/2024   3  4/24/2024   4   5/15/2024   5  5/20/2024   6  5/31/2024   7 - [place]  6/7/2024   8   HEP Post pelvic tilt  SKC  DKC  Piriformis stretch  LTR  Clam  TA Standing hip flexor stretch  Bridge  Abd sidelying  Standing rectus stretch  Pt advised to stretch prior to leaving health club  90/90 hand on knee    Seated rectus stretch To resume TA, clam  To resume abdominal stab supine as bernardino with sup march    Therapeutic Exercises Post pelvic tilt x 3  SKC x 1  DKC x 2  Piriformis stretch x 1  LTR x 3  Clam x 10  TA retraining  35'  Standing hip flexor stretch x 3  Bridge x 10  Abd 10 x 2  NuStep seat and arms 12, L5 x 6'   30'  X 1   Rectus stretch x 1  Bridge x 10  Abd x 10  NuStep x 8'  15'  Seated rectus stretch x 2  Standing hip flexor stretch x 3  LTR x 3  SKC x 3  Piriformis stretch x 2  DKC  30'  LTR x 3   SKC x 3   Piriformis stretch x 3  NuStep seat 12, arms 12 for half, L4 x 3 min  20'  X   X    X  DKC x 2  Post pelvic tilt  NuStep x 5'   20'  SKC x 1   DKC x 1   Piriformis stretch x 1   LTR x 3  NuStep seat and arms 12, L4 x 5 min   15'   Manual     The Stick to B lateral and ant thighs - pt  performed STM/MFR (B) lumbar paraspinals and QL as tolerated  10'  X  15'   Neuro ReEducation     Heel slide  Bent knee fall out  10'  X   X  90/90 with hand on knee  15'  TA retraining  Heel slide with heel on table x 2  TNE: educated regarding importance of continuing to move painful areas to reduce increased nerve sensitivity.  10'  X   Supine mini march  10'    X   X   Heel slide   10'   Therapeutic Activity Inst in back protection techniques for bending and lifting with application to household chores and lifting children at work  10'  Review for lifting children at work  Inst in when to incorporated which ex's for relief from pain and stiffness  5'   Inst in gradual progressive walking program  Inst in TA for activities that are provoking  10'  Gait training to decrease guarding and restore normal arm swing and shoulder/thoracic spine position/motion. Discussed gradually resuming normal pace of gait.  10'     Modalities     HP to back       HEP to date: post pelvic tilt, SKC, DKC, LTR, piriformis stretch, clam, abd, bridge, TA, heel slide, 90/90, bent knee fall out, standing hip flexor stretch, rectus stretch    Education:   appropriate use and potential benefits of use of moist heat and CP.    Insurance Primary/Secondary: MEDICARE / N/A       # Auth Visits: 12   Total Timed Treatment: 40 min  Date POC Expires: 7/2   Total Treatment time: 45 min       Charges: There Ex 1, Man Ther 1, NM Re-ed 1       Treatment Number: 8

## 2024-06-11 ENCOUNTER — OFFICE VISIT (OUTPATIENT)
Dept: PHYSICAL THERAPY | Facility: HOSPITAL | Age: 49
End: 2024-06-11
Attending: PHYSICAL MEDICINE & REHABILITATION
Payer: MEDICARE

## 2024-06-11 PROCEDURE — 97112 NEUROMUSCULAR REEDUCATION: CPT

## 2024-06-11 PROCEDURE — 97140 MANUAL THERAPY 1/> REGIONS: CPT

## 2024-06-11 PROCEDURE — 97110 THERAPEUTIC EXERCISES: CPT

## 2024-06-11 NOTE — PROGRESS NOTES
Diagnosis:   Lumbar spondylosis (M47.816)        Referring Provider: Valentin Cantu  Date of Evaluation: 4/3/2024    Precautions:  None Next MD visit: 7/3  Date of Surgery: n/a     Subjective:  Pt states she felt better the rest of the day and night after the last visit with pain down to a 1/10. Pt states the pain continues to fluctuate. Sit to stand and initial amb following sitting she has pain and stiffness and improves with walking. She has started using the microwavable moist hot pack.  Pt states she has not been doing the HEP regularly.   Current Pain: 5/10 lower lumbar   Objective/Goals:  Rx: flow chart    Assessment: No stretch felt with SKC or DKC. Able to bernardino LTR and piriformis stretch well. Difficulty with stabilization    Plan: Cont 1x/wk with progressive stabilization, mobility and flexibility and general conditioning.    Today's Rx:      5/15/2024   5  5/20/2024   6  5/31/2024   7 - [place]  6/7/2024   8  6/11/2024   9   HEP  Seated rectus stretch To resume TA, clam  To resume abdominal stab supine as bernardino with sup march     Therapeutic Exercises  Seated rectus stretch x 2  Standing hip flexor stretch x 3  LTR x 3  SKC x 3  Piriformis stretch x 2  DKC  30'  LTR x 3   SKC x 3   Piriformis stretch x 3  NuStep seat 12, arms 12 for half, L4 x 3 min  20'  X   X    X  DKC x 2  Post pelvic tilt  NuStep x 5'   20'  SKC x 1   DKC x 1   Piriformis stretch x 1   LTR x 3  NuStep seat and arms 12, L4 x 5 min   15'  X 3   X   X 2   X  X 6.5 min     15'   Manual  The Stick to B lateral and ant thighs - pt performed STM/MFR (B) lumbar paraspinals and QL as tolerated  10'  X  15'  X   15'   Neuro ReEducation  TA retraining  Heel slide with heel on table x 2  TNE: educated regarding importance of continuing to move painful areas to reduce increased nerve sensitivity.  10'  X   Supine mini march  10'    X   X   Heel slide   10'  X     X   10'     Therapeutic Activity Inst in gradual progressive walking program  Inst in TA  for activities that are provoking  10'  Gait training to decrease guarding and restore normal arm swing and shoulder/thoracic spine position/motion. Discussed gradually resuming normal pace of gait.  10'      Modalities  HP to back        HEP to date: post pelvic tilt, SKC, DKC, LTR, piriformis stretch, clam, abd, bridge, TA, heel slide, 90/90, bent knee fall out, standing hip flexor stretch, rectus stretch    Education:   appropriate use and potential benefits of use of moist heat and CP.    Insurance Primary/Secondary: MEDICARE / N/A       # Auth Visits: 12   Total Timed Treatment: 40 min  Date POC Expires: 7/2   Total Treatment time: 45 min       Charges: There Ex 1, Man Ther 1, NM Re-ed 1       Treatment Number: 9

## 2024-06-21 ENCOUNTER — OFFICE VISIT (OUTPATIENT)
Dept: PHYSICAL THERAPY | Facility: HOSPITAL | Age: 49
End: 2024-06-21
Attending: PHYSICAL MEDICINE & REHABILITATION

## 2024-06-21 PROCEDURE — 97112 NEUROMUSCULAR REEDUCATION: CPT

## 2024-06-21 PROCEDURE — 97110 THERAPEUTIC EXERCISES: CPT

## 2024-06-21 PROCEDURE — 97140 MANUAL THERAPY 1/> REGIONS: CPT

## 2024-06-21 NOTE — PROGRESS NOTES
Diagnosis:   Lumbar spondylosis (M47.816)        Referring Provider: Valentin Cantu  Date of Evaluation: 4/3/2024    Precautions:  None Next MD visit: 7/3  Date of Surgery: n/a     Subjective:  Pt states overall she has been a little better this past week with pain down to 1-3/10, with pain and stiffness noted primarily with prolonged sitting. She walked 40 min this morning and had increased pain starting at about 30 minutes. She had difficulty getting through the last 10 minutes to get back home due to B low back fatigue and pain. She notes the home exercises decrease her pain.   Current Pain: 7/10 lower lumbar   Objective/Goals:  Rx: flow chart    Assessment: Increased resting tone in R mid lumbar paraspinals with TTP.    Plan: Progress to 90/90 for stab. Cont 1x/wk with progressive stabilization, mobility and flexibility and general conditioning.    Today's Rx:     5/20/2024   6  5/31/2024   7 - [place]  6/7/2024   8  6/11/2024   9  6/21/2024   10   HEP To resume TA, clam  To resume abdominal stab supine as bernardino with sup march      Therapeutic Exercises  LTR x 3   SKC x 3   Piriformis stretch x 3  NuStep seat 12, arms 12 for half, L4 x 3 min  20'  X   X    X  DKC x 2  Post pelvic tilt  NuStep x 5'   20'  SKC x 1   DKC x 1   Piriformis stretch x 1   LTR x 3  NuStep seat and arms 12, L4 x 5 min   15'  X 3   X   X 2   X  X 6.5 min     15'  X   X   X   X   Held today   15'   Manual The Stick to B lateral and ant thighs - pt performed STM/MFR (B) lumbar paraspinals and QL as tolerated  10'  X  15'  X   15'  X  10'   Neuro ReEducation TA retraining  Heel slide with heel on table x 2  TNE: educated regarding importance of continuing to move painful areas to reduce increased nerve sensitivity.  10'  X   Supine mini march  10'    X   X   Heel slide   10'  X     X   10'    X   X   X   15'   Therapeutic Activity  Gait training to decrease guarding and restore normal arm swing and shoulder/thoracic spine position/motion.  Discussed gradually resuming normal pace of gait.  10'    Inst in use of stretching ex's to decrease pain and following increased activity to prevent increased pain.  Inst in more gradual increase in walking program and to stay closer to her home   Inst in trial of recumbent bike at the [employer]  5'   Modalities HP to back         HEP to date: post pelvic tilt, SKC, DKC, LTR, piriformis stretch, clam, abd, bridge, TA, heel slide, 90/90, bent knee fall out, standing hip flexor stretch, rectus stretch    Education:   appropriate use and potential benefits of use of moist heat and CP.    Insurance Primary/Secondary: MEDICARE / N/A       # Auth Visits: 12   Total Timed Treatment: 45 min  Date POC Expires: 7/2   Total Treatment time: 45 min       Charges: There Ex 1, Man Ther 1, NM Re-ed 1       Treatment Number: 10

## 2024-06-24 ENCOUNTER — OFFICE VISIT (OUTPATIENT)
Dept: PHYSICAL THERAPY | Facility: HOSPITAL | Age: 49
End: 2024-06-24
Attending: PHYSICAL MEDICINE & REHABILITATION

## 2024-06-24 PROCEDURE — 97112 NEUROMUSCULAR REEDUCATION: CPT

## 2024-06-24 NOTE — PROGRESS NOTES
Progress Summary  Pt has attended 11 visits in Physical Therapy from 4/3 - 6/24/2024    Diagnosis:   Lumbar spondylosis (M47.816)        Referring Provider: Valentin Cantu  Date of Evaluation: 4/3/2024    Precautions:  None Next MD visit: 7/3  Date of Surgery: n/a       Subjective:  Pt states that since her fall on 4/29, she notes an overall improvement of 30-40%. (B) lower lumbar pain = 0-7/10 and is aggravated by walking 10 minutes, sitting > a few hours, and supine to sit after prolonged lying down. She is still not doing her normal work activities in . L knee pain 0-6/10 and is present approximately 3x/wk without pattern. She also c/o buckling of the L knee approx 2x/wk. She notes the home exercises decrease her pain.   Current Pain: 4/10 lower lumbar   Objective/Goals:  Pt has continued to be seen 1x/wk for stretching, stabilization, general conditioning, and HEP progression.  Increased resting tone and TTP in R mid to lumbar paraspinals.    Assessment: Pt is progressing gradually but remains exacerbated since her fall.     Plan: Await results of your re-evaluation.  Goals: (to be met in 12 visits)   Physical Therapy Goals:  Patient will be independent in a HEP for lumbar stabilization, flexibility, and neuromobilization as indicated to reduce the risk of further irritation or injury - met  Patient will demonstrate correct back protection techniques for sitting, bending and lifting to reduce the risk of further irritation or injury - met  Symptoms will decrease by 75% to increase tolerance for bending, lifting, walking, household chores - in progress  Patient will report increased functional activity tolerance for work and household activities - in progress  Strength of lumbar stabilizers will increase to increase tolerance for lifting - in progress  Pt will be able to walk longer distances - in progress    Today's Rx:     5/31/2024   7 - MD  6/7/2024   8  6/11/2024   9  6/21/2024   10  6/24/2024   11    HEP  To resume abdominal stab supine as bernardino with sup march 90/90   Therapeutic Exercises  X   X    X  DKC x 2  Post pelvic tilt  NuStep x 5'   20'  SKC x 1   DKC x 1   Piriformis stretch x 1   LTR x 3  NuStep seat and arms 12, L4 x 5 min   15'  X 3   X   X 2   X  X 6.5 min     15'  X   X   X   X   Held today   15'     Manual STM/MFR (B) lumbar paraspinals and QL as tolerated  10'  X  15'  X   15'  X  10'  X  5'   Neuro ReEducation  X   Supine mini march  10'    X   X   Heel slide   10'  X     X   10'    X   X   X   15'  TA  Heel slide   90/90 without and with hand on knee  25'   Therapeutic Activity    Inst in use of stretching ex's to decrease pain and following increased activity to prevent increased pain.  Inst in more gradual increase in walking program and to stay closer to her home   Inst in [facility] of recumbent bike [facility] the health club  5'    Modalities          HEP to date: post pelvic tilt, SKC, DKC, LTR, piriformis stretch, clam, abd, bridge, TA, heel slide, 90/90, bent knee fall out, standing hip flexor stretch, rectus stretch    Education:   appropriate use and potential benefits of use of moist heat and CP.    Insurance Primary/Secondary: MEDICARE / N/A       # Auth Visits: 12   Total Timed Treatment: 30 min  Date POC Expires: 7/2   Total Treatment time: 30 min       Charges: NM Re-ed  2       Treatment Number: 11

## 2024-07-03 ENCOUNTER — OFFICE VISIT (OUTPATIENT)
Dept: PHYSICAL MEDICINE AND REHAB | Facility: CLINIC | Age: 49
End: 2024-07-03
Payer: MEDICARE

## 2024-07-03 DIAGNOSIS — M47.816 LUMBAR SPONDYLOSIS: ICD-10-CM

## 2024-07-03 DIAGNOSIS — E66.9 OBESITY (BMI 30-39.9): ICD-10-CM

## 2024-07-03 DIAGNOSIS — F41.0 PANIC ATTACKS: ICD-10-CM

## 2024-07-03 DIAGNOSIS — S83.92XA SPRAIN OF LEFT KNEE, UNSPECIFIED LIGAMENT, INITIAL ENCOUNTER: Primary | ICD-10-CM

## 2024-07-03 DIAGNOSIS — F32.4 MAJOR DEPRESSIVE DISORDER WITH SINGLE EPISODE, IN PARTIAL REMISSION (HCC): ICD-10-CM

## 2024-07-03 DIAGNOSIS — K21.9 GASTROESOPHAGEAL REFLUX DISEASE WITHOUT ESOPHAGITIS: ICD-10-CM

## 2024-07-03 PROCEDURE — 99214 OFFICE O/P EST MOD 30 MIN: CPT | Performed by: PHYSICAL MEDICINE & REHABILITATION

## 2024-07-03 RX ORDER — CYCLOBENZAPRINE HCL 10 MG
10 TABLET ORAL NIGHTLY
Qty: 30 TABLET | Refills: 0 | Status: SHIPPED | OUTPATIENT
Start: 2024-07-03 | End: 2024-08-02

## 2024-07-03 NOTE — PROGRESS NOTES
[facility]  Progress Note    CHIEF COMPLAINT:    Chief Complaint   Patient presents with    Follow - Up     LOV 4/30/24. Patient f/u on left knee and low back pain. Pain on knee 2/10. Pain on the back is a 5/10. Denies N/T. Denies weakness. Takes Ibuprofen for pain with relief. Patient completed PT with relief.        History of Present Illness:  Suzie Ferris is a 49 year old female who presents today for follow up for symptoms of Left knee and low back pain.  She is improving after seeing Giovanna Zaldivar.  Her leg is much better.  Her back is still sore but this is chronic.  She is finishing therapy.  Flexeril is helping.  She wants an exacerbation after walking 40 minutes.        PAST MEDICAL HISTORY:  Past Medical History:    Anemia    Anxiety    Colon polyp    None in 2022. REpeat CLN in 2032.    Herpes simplex type 1 infection    Hyperlipidemia    Menorrhagia    Morbid obesity with BMI of 40.0-44.9, adult (HCC)    Obesity       SURGICAL HISTORY:  Past Surgical History:   Procedure Laterality Date    Colonoscopy N/A 9/24/2019    Procedure: COLONOSCOPY;  Surgeon: POLLO Holland MD;  Location: [facility] ENDOSCOPY    D & c      x 2    Salpingectomy Bilateral 03/2017    Tubal ligation         SOCIAL HISTORY:   Social History     Occupational History    Not on file   Tobacco Use    Smoking status: Never    Smokeless tobacco: Never   Vaping Use    Vaping status: Never Used   Substance and Sexual Activity    Alcohol use: Yes     Comment: rarely    Drug use: No    Sexual activity: Yes     Partners: Male       CURRENT MEDICATIONS:   Current Outpatient Medications   Medication Sig Dispense Refill    cyclobenzaprine 10 MG Oral Tab Take 1 tablet (10 mg total) by mouth nightly. 30 tablet 0    CUSTOM MEDICATION Semaglutide/ cyanocobalamin    0.25 mg-   concentration: 1 /0.5 mg/ ML  Amount:  1 Ml vial  Instructions: inject 25 units/ 0.25 ML q weekly 1 each 3    predniSONE 10 MG Oral  Tab Take 7 tablets today,take 6 tablets tomorrow, then decrease number of tablets by one tablet each of the remaining days 28 tablet 0    Meloxicam 15 MG Oral Tab Take 1 tablet (15 mg total) by mouth daily. 30 each 0    rosuvastatin 5 MG Oral Tab Take 1 tablet (5 mg total) by mouth nightly.      traZODone 50 MG Oral Tab TAKE 1/2 TABLET BY MOUTH NIGHTLY AS NEEDED FOR SLEEP/AGITATION. MAY INCREASE TO A FULL TABLET IF TOLERATED      valACYclovir 500 MG Oral Tab       ibuprofen 600 MG Oral Tab Take 1 tablet (600 mg total) by mouth every 6 (six) hours as needed for Pain.      ALPRAZolam 0.5 MG Oral Tab Take 1 tablet (0.5 mg total) by mouth nightly as needed.      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 GENTLY BLOW NOSE PRIOR TO USE         ALLERGIES:   No Known Allergies        PHYSICAL EXAM:   There were no vitals taken for this visit.    There is no height or weight on file to calculate BMI.      General: No immediate distress  Extremities: No lower extremity edema bilaterally   Spine: full lumbar ROM in all directions, stretching discomfort with flexion, tender over the lumbosacral junction to light palpation  Knees: Limited knee flexion with pain at end range bilaterally at 110 degrees, no effusion  Neuro:   Cognition: alert & oriented x 3, attentive, able to follow 2 step commands, comprehention intact, spontaneous speech intact  Strength: Lower extremities have 5/5 strength  Sensation: Normal lower extremities  Reflexes: Normal lower extremities  SLR: neg bilaterally      Data    Radiology Imaging:  Lower extremity x-rays show arthritis      ASSESSMENT AND PLAN:  1. Sprain of left knee, unspecified ligament, initial encounter  She has degenerative knee arthritis bilaterally.  Exacerbation is over.  Plan is a few more weeks of PT, continue Flexeril for a little bit.  We discussed gradually increasing walking to 30 minutes, no  more than that.   Return as needed.    2. Lumbar spondylosis  Vastly improved    3. Gastroesophageal reflux disease without esophagitis  No NSAIDs, limits treatment options    4. Obesity (BMI 30-39.9)  Negative comorbidity for back and lower extremity pain    5. Major depressive disorder with single episode, in partial remission (HCC)  Negative comorbidity for painful conditions    6. Panic attacks  Negative comorbidity for painful conditions        RTC as needed      The patient was in agreement with the assessment and plan.  All questions were answered.        Valentin Cantu MD  Physical Medicine and Rehabilitation/Sports Medicine  [facility]

## 2024-07-19 ENCOUNTER — OFFICE VISIT (OUTPATIENT)
Dept: PHYSICAL THERAPY | Facility: HOSPITAL | Age: 49
End: 2024-07-19
Attending: PHYSICAL MEDICINE & REHABILITATION
Payer: MEDICARE

## 2024-07-19 PROCEDURE — 97112 NEUROMUSCULAR REEDUCATION: CPT

## 2024-07-19 PROCEDURE — 97530 THERAPEUTIC ACTIVITIES: CPT

## 2024-07-19 PROCEDURE — 97110 THERAPEUTIC EXERCISES: CPT

## 2024-07-19 NOTE — PROGRESS NOTES
Diagnosis:   Lumbar spondylosis (M47.816)        Referring Provider: Valentin Cantu  Date of Evaluation: 4/3/2024    Precautions:  None Next MD visit: none set  Date of Surgery: n/a       Subjective:  Pt states the aching in her low back and R knee fluctuate, and the last 3 days they have been worse.  She has generally been taking the muscle relaxants much less frequently. She has been walking 20 minutes/day, but she has not done any ex for the past 3 days.  Pt saw Dr. Cantu  Current Pain: 6/10 lower lumbar; 4/10 knee  Objective/Goals:   Rx: flow chart  Increased resting tone and TTP in R mid to lumbar paraspinals.    Assessment: Pt continues to report fluctuating symptoms. She has improved overall since she fell, but she has not returned to there prior level of function. TTP persists R mid-lumbar paraspinals    Plan: Continue PT 1x/wk for up to 6 more sessions as needed to achieve goals.  Goals: (to be met in 12 visits)   Physical Therapy Goals:  Patient will be independent in a HEP for lumbar stabilization, flexibility, and neuromobilization as indicated to reduce the risk of further irritation or injury - met  Patient will demonstrate correct back protection techniques for sitting, bending and lifting to reduce the risk of further irritation or injury - met  Symptoms will decrease by 75% to increase tolerance for bending, lifting, walking, household chores - in progress  Patient will report increased functional activity tolerance for work and household activities - in progress  Strength of lumbar stabilizers will increase to increase tolerance for lifting - in progress  Pt will be able to walk longer distances - in progress    Today's Rx:     6/7/2024   8  6/11/2024   9  6/21/2024   10  6/24/2024   11  7/19/2024   12   HEP     90/90  Hold due to too difficulty  SLR with stab as bernardino   Therapeutic Exercises  SKC x 1   DKC x 1   Piriformis stretch x 1   LTR x 3  NuStep seat and arms 12, L4 x 5 min   15'  X 3    X   X 2   X  X 6.5 min     15'  X   X   X   X   Held today   15'    X 3   X   X 2    NuStep 7 min  15'   Manual  X  15'  X   15'  X  10'  X  5'  X   5'   Neuro ReEducation  X   X   Heel slide   10'  X     X   10'    X   X   X   15'  TA  Heel slide   90/90 without and with hand on knee  25'  X   X 5   SLR x 3  90/90 x 2  15'   Therapeutic Activity   Inst in use of stretching ex's to decrease pain and following increased activity to prevent increased pain.  Inst in more gradual increase in walking program and to stay closer to her home   Inst in [facility] of recumbent bike [facility] the health club  5'   Advised of potential benefits of doing her stretches and using CP and or HP when she is having a day with increased pain.   Inst in gradual return to eliptical and bike at the healthub   10'   Modalities          HEP to date: post pelvic tilt, SKC, DKC, LTR, piriformis stretch, clam, abd, bridge, TA, heel slide, 90/90, bent knee fall out, standing hip flexor stretch, rectus stretch    Education:   appropriate use and potential benefits of use of moist heat and CP.    Insurance Primary/Secondary: MEDICARE / N/A       # Auth Visits: 12 + 6   Total Timed Treatment: 45 min  Date POC Expires: 9/22  Total Treatment time: 45 min       Charges: NM Re-ed  1, There Ex 1, There Act 1       Treatment Number: 12

## 2024-07-26 ENCOUNTER — OFFICE VISIT (OUTPATIENT)
Dept: PHYSICAL THERAPY | Facility: HOSPITAL | Age: 49
End: 2024-07-26
Attending: PHYSICAL MEDICINE & REHABILITATION
Payer: MEDICARE

## 2024-07-26 PROCEDURE — 97110 THERAPEUTIC EXERCISES: CPT

## 2024-07-26 PROCEDURE — 97530 THERAPEUTIC ACTIVITIES: CPT

## 2024-07-26 PROCEDURE — 97112 NEUROMUSCULAR REEDUCATION: CPT

## 2024-07-26 NOTE — PROGRESS NOTES
Diagnosis:   Lumbar spondylosis (M47.816)        Referring Provider: Valentin Cantu  Date of Evaluation: 4/3/2024    Precautions:  None Next MD visit: none set  Date of Surgery: n/a       Subjective:  Pt states she has been walking 20-25 min almost every day on a track and tolerates it well at the time, but she does notice some aching afterwards. She reports R knee stiffness and tightness during and after walking this distance. She is no longer having lumbar stiffness with getting out of the car or first thing in the morning. She has been using moist heat during the day and a heating pad on a timer in bed at night. She continues to take a muscle relaxant at bedtime. She is avoiding doing a flight of stairs into due to R ant knee pain, but she is able to do he 4 steps in/out of her home reciprocally without pain.  Current Pain: 0/10 lower lumbar; 4/10 knee  Objective/Goals:   Rx: flow chart  Increased resting tone and TTP in R mid to lower lumbar paraspinals has significantly decreased and is now minimal.  B patellae lateral glide and tilt.  No significant crepitus.    Assessment: Pt is reporting significant improvement in her lumbar symptoms at this time. Presently her chief c/o is of R ant knee pain.    Plan: Continue PT 1x/wk for up to 5 more sessions as needed to achieve goals.  Monitor response to quad strengthening. Add quad stretching.    Goals: (to be met in 12 visits)   Physical Therapy Goals:  Patient will be independent in a HEP for lumbar stabilization, flexibility, and neuromobilization as indicated to reduce the risk of further irritation or injury - met  Patient will demonstrate correct back protection techniques for sitting, bending and lifting to reduce the risk of further irritation or injury - met  Symptoms will decrease by 75% to increase tolerance for bending, lifting, walking, household chores - in progress  Patient will report increased functional activity tolerance for work and household  activities - in progress  Strength of lumbar stabilizers will increase to increase tolerance for lifting - in progress  Pt will be able to walk longer distances - in progress    Today's Rx:     6/11/2024   9  6/21/2024   10  6/24/2024   11  7/19/2024   12  7/26/2024   13   HEP    90/90  Hold due to too difficulty  SLR with stab as bernardino  SAQ with 2-5#  Mini squat   Therapeutic Exercises  X 3   X   X 2   X  X 6.5 min     15'  X   X   X   X   Held today   15'    X 3   X   X 2    NuStep 7 min  15' SAQ with 3# x 10; 5# 10x2  HEP progression  10'     Manual  X   15'  X  10'  X  5'  X   5'  R lumbar   5'   Neuro ReEducation  X     X   10'    X   X   X   15'  TA  Heel slide   90/90 without and with hand on knee  25'  X   X 5   SLR x 3  90/90 x 2  15' SLR with stab  Heel slide   15'   Therapeutic Activity  Inst in use of stretching ex's to decrease pain and following increased activity to prevent increased pain.  Inst in more gradual increase in walking program and to stay closer to her home   Inst in trial of recumbent bike at the health club  5'   Advised of potential benefits of doing her stretches and using CP and or HP when she is having a day with increased pain.   Inst in gradual return to eliptical and bike at the [facility]   10' Mini squat to facilitate increased bernardino for stair amb.  Inst to add bike with gradual progression outlined.  Inst in stand to sit with control and to decrease stress on ant knees   15'   Modalities          HEP to date: post pelvic tilt, SKC, DKC, LTR, piriformis stretch, clam, abd, bridge, TA, heel slide, 90/90, bent knee fall out, standing hip flexor stretch, rectus stretch    Education:   appropriate use and potential benefits of use of moist heat and CP.    Insurance Primary/Secondary: MEDICARE / N/A       # Auth Visits: 12 + 6   Total Timed Treatment: 45 min  Date POC Expires: 9/22  Total Treatment time: 45 min       Charges: NM Re-ed  1, There Ex 1, There Act 1       Treatment Number:  13

## 2024-07-30 ENCOUNTER — TELEPHONE (OUTPATIENT)
Dept: PHYSICAL THERAPY | Facility: HOSPITAL | Age: 49
End: 2024-07-30

## 2024-07-30 ENCOUNTER — TELEPHONE (OUTPATIENT)
Dept: PHYSICAL THERAPY | Age: 49
End: 2024-07-30

## 2024-07-31 ENCOUNTER — OFFICE VISIT (OUTPATIENT)
Dept: PHYSICAL THERAPY | Facility: HOSPITAL | Age: 49
End: 2024-07-31
Attending: PHYSICAL MEDICINE & REHABILITATION
Payer: MEDICARE

## 2024-07-31 PROCEDURE — 97140 MANUAL THERAPY 1/> REGIONS: CPT

## 2024-07-31 PROCEDURE — 97110 THERAPEUTIC EXERCISES: CPT

## 2024-07-31 NOTE — PROGRESS NOTES
Diagnosis:   Lumbar spondylosis (M47.816)        Referring Provider: Valentin Cantu  Date of Evaluation: 4/3/2024    Precautions:  None Next MD visit: none set  Date of Surgery: n/a       Subjective:  Pt states she was doing well with pain only 1-2/10 and only occasionally until today when she began having pain in the L side of her back a few hours after getting up. She notes no precipitating incident. She has still been walking 20-25 min almost every day on a track and she added the bike x 10 min and tolerates it well. She thinks her R knee feels slightly better.  Current Pain: 8/10 lower lumbar; 0-2/10 knee  Objective/Goals:   Rx: flow chart  Increased resting tone and TTP in L > R mid to lower lumbar paraspinals   B patellae lateral glide and tilt.  No significant crepitus.    Assessment: Pt is experiencing an exacerbation of her lumbar pain presently. Overall, functional activity tolerance has increased.  Pt reported lumbar pain decreased from 8/10 to 2/10 following rx.    Plan: Continue PT 1x/wk for up to 4 more sessions as needed to achieve goals.     Goals: (to be met in 12 visits)   Physical Therapy Goals:  Patient will be independent in a HEP for lumbar stabilization, flexibility, and neuromobilization as indicated to reduce the risk of further irritation or injury - met  Patient will demonstrate correct back protection techniques for sitting, bending and lifting to reduce the risk of further irritation or injury - met  Symptoms will decrease by 75% to increase tolerance for bending, lifting, walking, household chores - in progress  Patient will report increased functional activity tolerance for work and household activities - in progress  Strength of lumbar stabilizers will increase to increase tolerance for lifting - in progress  Pt will be able to walk longer distances - in progress    Today's Rx:     6/21/2024   10  6/24/2024   11  7/19/2024   12  7/26/2024   13  7/31/2024   14   HEP   90/90  Hold  due to too difficulty  SLR with stab as bernardino  SAQ with 2-5#  Mini squat    Therapeutic Exercises  X   X   X   X   Held today   15'    X 3   X   X 2    NuStep 7 min  15' SAQ with 3# x 10; 5# 10x2  HEP progression  10'    5# 10x3  SKC x 3   Piriformis stretch x 3   LTR x 3  Rectus stretch seated  NuStep seat and arms 13, L5 x 8 min  35'       Manual  X  10'  X  5'  X   5'  R lumbar   5'  L lumbar in R sidelying   10'   Neuro ReEducation  X   X   X   15'  TA  Heel slide   90/90 without and with hand on knee  25'  X   X 5   SLR x 3  90/90 x 2  15' SLR with stab  Heel slide   15'    Therapeutic Activity Inst in use of stretching ex's to decrease pain and following increased activity to prevent increased pain.  Inst in more gradual increase in walking program and to stay closer to her home   Inst in trial of recumbent bike [facility] the health club  5'   Advised of potential benefits of doing her stretches and using CP and or HP when she is having a day with increased pain.   Inst in gradual return to eliptical and bike at the [facility]   10' Mini squat to facilitate increased bernardino for stair amb.  Inst to add bike with gradual progression outlined.  Inst in stand to sit with control and to decrease stress on ant knees   15'  Attempted mini squat - provoked lumbar pain so discontinued   Modalities          HEP to date: post pelvic tilt, SKC, DKC, LTR, piriformis stretch, clam, abd, bridge, TA, heel slide, 90/90, bent knee fall out, standing hip flexor stretch, rectus stretch, SAQ    Education:   appropriate use and potential benefits of use of moist heat and CP.    Insurance Primary/Secondary: MEDICARE / N/A       # Auth Visits: 12 + 6   Total Timed Treatment: 45 min  Date POC Expires: 9/22  Total Treatment time: 45 min       Charges: Man Ther 1, There Ex 2,        Treatment Number: 14

## 2024-08-06 ENCOUNTER — OFFICE VISIT (OUTPATIENT)
Dept: PHYSICAL THERAPY | Facility: HOSPITAL | Age: 49
End: 2024-08-06
Attending: PHYSICAL MEDICINE & REHABILITATION
Payer: MEDICARE

## 2024-08-06 PROCEDURE — 97112 NEUROMUSCULAR REEDUCATION: CPT

## 2024-08-06 PROCEDURE — 97140 MANUAL THERAPY 1/> REGIONS: CPT

## 2024-08-06 PROCEDURE — 97530 THERAPEUTIC ACTIVITIES: CPT

## 2024-08-06 PROCEDURE — 97110 THERAPEUTIC EXERCISES: CPT

## 2024-08-06 NOTE — PROGRESS NOTES
Diagnosis:   Lumbar spondylosis (M47.816)        Referring Provider: Valentin Cantu  Date of Evaluation: 4/3/2024    Precautions:  None Next MD visit: none set  Date of Surgery: n/a       Subjective:  Pt states she was able to walk 36 min without increased back or knee pain or tightness. She was also able to do 3 sets of 10 jumping jacks without provocation. She has been feeling good the past 2 days but had pain in her knee and low back over the weekend.  Current Pain: 1/10 lower lumbar; 4/10 knee  Objective/Goals:   Rx: flow chart     Assessment: Overall progress with increased functional activity and exercise tolerance.  Continues to have days with increased pain without known cause.    Plan: Continue PT 1x/wk for up to 3 more sessions as needed to achieve goals.     Goals: (to be met in 12 visits)   Physical Therapy Goals:  Patient will be independent in a HEP for lumbar stabilization, flexibility, and neuromobilization as indicated to reduce the risk of further irritation or injury - met  Patient will demonstrate correct back protection techniques for sitting, bending and lifting to reduce the risk of further irritation or injury - met  Symptoms will decrease by 75% to increase tolerance for bending, lifting, walking, household chores - in progress  Patient will report increased functional activity tolerance for work and household activities - in progress  Strength of lumbar stabilizers will increase to increase tolerance for lifting - in progress  Pt will be able to walk longer distances - in progress    Today's Rx:     6/24/2024   11  7/19/2024   12  7/26/2024   13  7/31/2024   14  8/6/2024   15   HEP  90/90  Hold due to too difficulty  SLR with stab as bernardino  SAQ with 2-5#  Mini squat     Therapeutic Exercises    X 3   X   X 2    NuStep 7 min  15' SAQ with 3# x 10; 5# 10x2  HEP progression  10'    5# 10x3  SKC x 3   Piriformis stretch x 3   LTR x 3  Rectus stretch seated  NuStep seat and arms 13, L5 x 8  min  35'      X  SLR 5 x 2   NuStep x 8 '  10'   Manual  X  5'  X   5'  R lumbar   5'  L lumbar in R sidelying   10'  X B in L sidelying  10'        Neuro ReEducation  TA  Heel slide   90/90 without and with hand on knee  25'  X   X 5   SLR x 3  90/90 x 2  15' SLR with stab  Heel slide   15'      X   Bent knee fall out   Sharman L1 with hand on first knee up  15'   Therapeutic Activity   Advised of potential benefits of doing her stretches and using CP and or HP when she is having a day with increased pain.   Inst in gradual return to eliptical and bike at the [facility]   10' Mini squat to facilitate increased bernardino for stair amb.  Inst to add bike with gradual progression outlined.  Inst in stand to sit with control and to decrease stress on ant knees   15'  Attempted mini squat - provoked lumbar pain so discontinued  Mini-squats for stair amb x 10. Review of LE mechanics and back protection  10'   Modalities          HEP to date: post pelvic tilt, SKC, DKC, LTR, piriformis stretch, clam, abd, bridge, TA, heel slide, 90/90, bent knee fall out, standing hip flexor stretch, rectus stretch, SAQ    Education:   appropriate use and potential benefits of use of moist heat and CP.    Insurance Primary/Secondary: MEDICARE / N/A       # Auth Visits: 12 + 6   Total Timed Treatment: 45 min  Date POC Expires: 9/22  Total Treatment time: 45 min       Charges: Man Ther 1, There Ex 1, There Act 1, NM Re-ed 1       Treatment Number: 15

## 2024-08-13 ENCOUNTER — OFFICE VISIT (OUTPATIENT)
Dept: PHYSICAL THERAPY | Facility: HOSPITAL | Age: 49
End: 2024-08-13
Attending: PHYSICAL MEDICINE & REHABILITATION
Payer: MEDICARE

## 2024-08-13 PROCEDURE — 97112 NEUROMUSCULAR REEDUCATION: CPT

## 2024-08-13 PROCEDURE — 97110 THERAPEUTIC EXERCISES: CPT

## 2024-08-13 PROCEDURE — 97530 THERAPEUTIC ACTIVITIES: CPT

## 2024-08-13 NOTE — PROGRESS NOTES
Diagnosis:   Lumbar spondylosis (M47.816)        Referring Provider: Valentin Cantu  Date of Evaluation: 4/3/2024    Precautions:  None Next MD visit: none set  Date of Surgery: n/a       Subjective:  Pt states she is having occasional shooting pain in the L lumbar region to buttock without pattern. She walked 38 minutes yesterday without back pain or stiffness. No symptoms on the R.  No pain today. No c/o R knee pain, just intermittent tightness.  She did 40 jumping jacks without pain afterwards but some mild pain during. Pt states she has started doing some lifting for work activities and is tolerating it fine.  Current Pain: 0/10 lower lumbar; 0/10 knee  Objective/Goals:   Rx: flow chart     Assessment: Pt continues to make functional gains with increased tolerance for ambulation and exercise.    Plan: Continue PT 1x/wk for up to 2 more sessions as needed to achieve goals. Try wall planks.    Goals: (to be met in 12 visits)   Physical Therapy Goals:  Patient will be independent in a HEP for lumbar stabilization, flexibility, and neuromobilization as indicated to reduce the risk of further irritation or injury - met  Patient will demonstrate correct back protection techniques for sitting, bending and lifting to reduce the risk of further irritation or injury - met  Symptoms will decrease by 75% to increase tolerance for bending, lifting, walking, household chores - in progress  Patient will report increased functional activity tolerance for work and household activities - in progress  Strength of lumbar stabilizers will increase to increase tolerance for lifting - in progress  Pt will be able to walk longer distances - in progress    Today's Rx:     6/24/2024   11  7/19/2024   12  7/26/2024   13  7/31/2024   14  8/6/2024   15  8/13/2024   16   HEP  90/90  Hold due to too difficulty  SLR with stab as bernardino  SAQ with 2-5#  Mini squat      Therapeutic Exercises    X 3   X   X 2    NuStep 7 min  15' SAQ with 3# x  10; 5# 10x2  HEP progression  10'    5# 10x3  SKC x 3   Piriformis stretch x 3   LTR x 3  Rectus stretch seated  NuStep seat and arms 13, L5 x 8 min  35'      X  SLR 5 x 2   NuStep x 8 '  10'  X 7#  X   X 10'  Abd x 10  Clam x 10  15'      Manual  X  5'  X   5'  R lumbar   5'  L lumbar in R sidelying   10'  X B in L sidelying  10'         Neuro ReEducation  TA  Heel slide   90/90 without and with hand on knee  25'  X   X 5   SLR x 3  90/90 x 2  15' SLR with stab  Heel slide   15'      X   Bent knee fall out   Sharman L1 with hand on first knee up  15'     Heel slide review     X   15'   Therapeutic Activity   Advised of potential benefits of doing her stretches and using CP and or HP when she is having a day with increased pain.   Inst in gradual return to eliptical and bike at the [facility]   10' Mini squat to facilitate increased bernardino for stair amb.  Inst to add bike with gradual progression outlined.  Inst in stand to sit with control and to decrease stress on ant knees   15'  Attempted mini squat - provoked lumbar pain so discontinued  Mini-squats for stair amb x 10. Review of LE mechanics and back protection  10'  10x2  Review of LE mechanics for jumping jacks and all closed chain ex's she chooses to perform   15'   Modalities           HEP to date: post pelvic tilt, SKC, DKC, LTR, piriformis stretch, clam, abd, bridge, TA, heel slide, 90/90, bent knee fall out, standing hip flexor stretch, rectus stretch, SAQ    Education:     Insurance Primary/Secondary: MEDICARE / N/A       # Auth Visits: 12 + 6   Total Timed Treatment: 45 min  Date POC Expires: 9/22  Total Treatment time: 45 min       Charges: There Ex 1, There Act 1, NM Re-ed 1       Treatment Number: 16

## 2024-08-23 ENCOUNTER — TELEPHONE (OUTPATIENT)
Dept: PHYSICAL THERAPY | Facility: HOSPITAL | Age: 49
End: 2024-08-23

## 2024-08-23 ENCOUNTER — APPOINTMENT (OUTPATIENT)
Dept: PHYSICAL THERAPY | Facility: HOSPITAL | Age: 49
End: 2024-08-23
Attending: PHYSICAL MEDICINE & REHABILITATION
Payer: MEDICARE

## 2024-08-30 ENCOUNTER — OFFICE VISIT (OUTPATIENT)
Dept: PHYSICAL THERAPY | Facility: HOSPITAL | Age: 49
End: 2024-08-30
Attending: PHYSICAL MEDICINE & REHABILITATION
Payer: MEDICARE

## 2024-08-30 PROCEDURE — 97140 MANUAL THERAPY 1/> REGIONS: CPT

## 2024-08-30 PROCEDURE — 97110 THERAPEUTIC EXERCISES: CPT

## 2024-08-30 PROCEDURE — 97112 NEUROMUSCULAR REEDUCATION: CPT

## 2024-08-30 NOTE — PROGRESS NOTES
Discharge Summary  Pt has attended 17 visits in Physical Therapy from 4/3 - 8/30/2024       Diagnosis:   Lumbar spondylosis (M47.816)        Referring Provider: Valentin Cantu  Date of Evaluation: 4/3/2024    Precautions:  None Next MD visit: none set  Date of Surgery: n/a       Subjective:  Pt reports significant improvement in her pain and states she has no remaining functional limitations except for heavy lifting. She has been painfree in her back for the past week, and she is no longer having knee pain. She has increased her walking for exercise to 90 minutes 3x/wk. She states prior to the past week she was experiencing only occasional shooting pains in the L lumbar region to buttock on days when she was more sedentary, but these were transient and only a few occurred on the days they were present. She notes occasional stiffness in her low back upon awakening.   Current Pain: 0/10 lower lumbar; 0/10 knee  Objective/Goals:   Rx: flow chart     Assessment: Pt has returned to her prior level of function except for heavy lifting.     GEMA improved from 34% to 6%.    Plan: Pt has been d/c'ed with a HEP due to meeting goals.    Goals: (to be met in 12 visits)   Physical Therapy Goals:  Patient will be independent in a HEP for lumbar stabilization, flexibility, and neuromobilization as indicated to reduce the risk of further irritation or injury - met  Patient will demonstrate correct back protection techniques for sitting, bending and lifting to reduce the risk of further irritation or injury - met  Symptoms will decrease by 75% to increase tolerance for bending, lifting, walking, household chores - met  Patient will report increased functional activity tolerance for work and household activities - met  Strength of lumbar stabilizers will increase to increase tolerance for lifting - partially met  Pt will be able to walk longer distances - met    Today's Rx:     7/26/2024   13  7/31/2024   14  8/6/2024   15   8/13/2024   16  8/30/2024   17   HEP  SAQ with 2-5#  Mini squat    Planks on knees   Prone plan on wall   Therapeutic Exercises SAQ with 3# x 10; 5# 10x2  HEP progression  10'    5# 10x3  SKC x 3   Piriformis stretch x 3   LTR x 3  Rectus stretch seated  NuStep seat and arms 13, L5 x 8 min  35'      X  SLR 5 x 2   NuStep x 8 '  10'  X 7#  X   X 10'  Abd x 10  Clam x 10  15'     HEP review and prioritization of lumbar stab and piriformis stretch.  15'     Manual  R lumbar   5'  L lumbar in R sidelying   10'  X B in L sidelying  10'        X   15'   Neuro ReEducation SLR with stab  Heel slide   15'      X   Bent knee fall out   Sharman L1 with hand on first knee up  15'     Heel slide review     X   15' Prone plank on wall and inst in prone planks on knees  10'     Therapeutic Activity Mini squat to facilitate increased bernardino for stair amb.  Inst to add bike with gradual progression outlined.  Inst in stand to sit with control and to decrease stress on ant knees   15'  Attempted mini squat - provoked lumbar pain so discontinued  Mini-squats for stair amb x 10. Review of LE mechanics and back protection  10'  10x2  Review of LE mechanics for jumping jacks and all closed chain ex's she chooses to perform   15'    Modalities          HEP to date: post pelvic tilt, SKC, DKC, LTR, piriformis stretch, clam, abd, bridge, TA, heel slide, 90/90, bent knee fall out, standing hip flexor stretch, rectus stretch, SAQ    Education:     Insurance Primary/Secondary: MEDICARE / N/A       # Auth Visits: 12 + 6   Total Timed Treatment: 40 min  Date POC Expires: 9/22  Total Treatment time: 40 min       Charges: There Ex 1, There Act 1, NM Re-ed 1       Treatment Number: 17

## 2024-09-23 DIAGNOSIS — E66.9 OBESITY (BMI 30-39.9): Primary | ICD-10-CM

## 2024-09-24 DIAGNOSIS — E66.9 OBESITY (BMI 30-39.9): ICD-10-CM

## 2024-09-27 DIAGNOSIS — E66.9 OBESITY (BMI 30-39.9): Primary | ICD-10-CM

## 2024-11-06 ENCOUNTER — TELEPHONE (OUTPATIENT)
Dept: OBGYN CLINIC | Facility: CLINIC | Age: 49
End: 2024-11-06

## 2024-11-11 ENCOUNTER — OFFICE VISIT (OUTPATIENT)
Dept: SURGERY | Facility: CLINIC | Age: 49
End: 2024-11-11
Payer: MEDICARE

## 2024-11-11 VITALS
DIASTOLIC BLOOD PRESSURE: 60 MMHG | SYSTOLIC BLOOD PRESSURE: 100 MMHG | OXYGEN SATURATION: 99 % | HEART RATE: 68 BPM | HEIGHT: 65.5 IN | WEIGHT: 208.31 LBS | BODY MASS INDEX: 34.29 KG/M2

## 2024-11-11 DIAGNOSIS — Z51.81 ENCOUNTER FOR THERAPEUTIC DRUG MONITORING: ICD-10-CM

## 2024-11-11 DIAGNOSIS — E78.5 DYSLIPIDEMIA: Primary | ICD-10-CM

## 2024-11-11 DIAGNOSIS — R73.03 PREDIABETES: ICD-10-CM

## 2024-11-11 DIAGNOSIS — E66.9 OBESITY (BMI 30-39.9): ICD-10-CM

## 2024-11-11 PROCEDURE — 99214 OFFICE O/P EST MOD 30 MIN: CPT | Performed by: INTERNAL MEDICINE

## 2024-11-11 NOTE — PROGRESS NOTES
Patient:  Suzie Ferris  :      1975  MRN:      [id number]    Chief Complaint:    Chief Complaint   Patient presents with    Follow - Up       SUBJECTIVE     History of Present Illness:  Suzie is being seen today for a follow-up for non-surgical weight loss.      Difficulty tolerating Qsymia- palpitations.     Ongoing knee pain.     Past Medical History:   Past Medical History:    Anemia    Anxiety    Colon polyp    None in . REpeat CLN in .    Herpes simplex type 1 infection    Hyperlipidemia    Menorrhagia    Morbid obesity with BMI of 40.0-44.9, adult (HCC)    Obesity      Comorbidities:  Dyslipidemia     OBJECTIVE     Vitals: /60   Pulse 68   Ht 5' 5.5\" (1.664 m)   Wt 208 lb 4.8 oz (94.5 kg)   SpO2 99%   BMI 34.14 kg/m²     Initial weight loss: -17   Total weight loss: -40   Start weight: 248    Wt Readings from Last 3 Encounters:   24 208 lb 4.8 oz (94.5 kg)   24 225 lb (102.1 kg)   24 227 lb (103 kg)       Patient Medications:    Current Outpatient Medications   Medication Sig Dispense Refill    CUSTOM MEDICATION Semaglutide/ cyanocobalamin    1 mg -   Concentration: 5 mg/ 0.5 mL   Amount: 1 ML vial   Instructions: Injection 0.2 mL/ 20 units sq weekly 1 each 5    Meloxicam 15 MG Oral Tab Take 1 tablet (15 mg total) by mouth daily. 30 each 0    rosuvastatin 5 MG Oral Tab Take 1 tablet (5 mg total) by mouth nightly.      traZODone 50 MG Oral Tab TAKE 1/2 TABLET BY MOUTH NIGHTLY AS NEEDED FOR SLEEP/AGITATION. MAY INCREASE TO A FULL TABLET IF TOLERATED      valACYclovir 500 MG Oral Tab       ibuprofen 600 MG Oral Tab Take 1 tablet (600 mg total) by mouth every 6 (six) hours as needed for Pain.      ALPRAZolam 0.5 MG Oral Tab Take 1 tablet (0.5 mg total) by mouth nightly as needed.      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 GENTLY BLOW NOSE PRIOR TO USE      predniSONE 10  MG Oral Tab Take 7 tablets today,take 6 tablets tomorrow, then decrease number of tablets by one tablet each of the remaining days (Patient not taking: Reported on 11/11/2024) 28 tablet 0     Allergies:  Patient has no known allergies.     Social History: Reviewed    Surgical History:    Past Surgical History:   Procedure Laterality Date    Colonoscopy N/A 9/24/2019    Procedure: COLONOSCOPY;  Surgeon: POLLO Holland MD;  Location: [facility] ENDOSCOPY    D & c      x 2    Salpingectomy Bilateral 03/2017    Tubal ligation       Family History:    Family History   Problem Relation Age of Onset    Diabetes Mother     Hypertension Mother     Diabetes Sister     Diabetes Father     Other (Other) Father        Food Journal  Reviewed and Discussed:       Patient has a Food Journal?: no   Patient is reading nutrition labels?  yes  Average Caloric Intake:     Average CHO Intake:   Is patient exercising? yes less due to knee pain  Type of exercise? Walking    Eating Habits  Patient states the following:  Eats 3 meal(s) per day  Length of time it takes to consume a meal:    # of snacks per day: as stated below   Type of snacks: as stated below   Amount of soda consumption per day:  Rare   Amount of water (in ounces) per day: adequate- adds lemon/lime  Toughest challenge:  knee pain, medication side effects     Nutritional Goals  Eat 3-4 cups of fresh fruits or vegetables daily    Behavior Modifications Reviewed and Discussed  Eat breakfast, Eat 3 meals per day, Plan meals in advance, Read nutrition labels, Drink 64 oz of water per day, Maintain a daily food journal, No drinking 30 minutes before or after meals, Utlize portion control strategies to reduce calorie intake, Identify triggers for eating and manage cues and Eat slowly and take 20 to 30 minutes to complete each meal    Exercise Goals Reviewed and Discussed    Aim for 150 minutes moderate level exercise weekly with 2-3 days strength training    ROS:    Review of Systems    Constitutional: Negative.    Respiratory: Negative.     Cardiovascular: Negative.    Gastrointestinal: Negative.    Genitourinary: Negative.    Musculoskeletal: Negative.    Skin: Negative.    Neurological: Negative.        Physical Exam:   General appearance: alert, appears stated age, cooperative and obese  Head: Normocephalic, without obvious abnormality, atraumatic  Neck: no adenopathy, no carotid bruit, no JVD, supple, symmetrical, trachea midline and thyroid not enlarged, symmetric, no tenderness/mass/nodules  Lungs: clear to auscultation bilaterally  Heart: S1, S2 normal, no murmur, click, rub or gallop, regular rate and rhythm  Abdomen: soft, non-tender; bowel sounds normal; no masses,  no organomegaly and abdomen obese   Extremities: intact, no edema   Pulses: 2+ and symmetric  Skin: intact   Neurologic: Grossly normal    ASSESSMENT     Encounter Diagnosis(ses):   Encounter Diagnoses   Name Primary?    Dyslipidemia Yes    Prediabetes     Encounter for therapeutic drug monitoring     Obesity (BMI 30-39.9)          PLAN       Iron deficiency anemia: Continue supplement.    DYSLIPIDEMIA: Improved on statin. Recommend dietary changes and lifestyle modifications as discussed below. Monitor.       Lab Results   Component Value Date/Time    CHOLEST 173 01/09/2024 07:45 AM    LDL 73 01/09/2024 07:45 AM    HDL 91 (H) 01/09/2024 07:45 AM    TRIG 44 01/09/2024 07:45 AM    VLDL 7 01/09/2024 07:45 AM     OBESITY:  PREDIABETES:    Goals for next month:  1. Keep a food log.  2. Drink 48-64 ounces of non-caloric beverages per day. No fruit juices or regular soda.  3. Increase activity-upper body exercises, walk 10 minutes per day.  4. Increase fruit and vegetable servings to 5-6 per day.        Denies personal or family hx medullary thyroid CA, endocrine neoplasia syndrome, pancreatitis hx, suicidal ideation. No renal impairment, severe GI disease, diabetes, pancreatitis risks noted.    Wegovy not covered.      Semiglutide: tolerating well  Refill at current dose  May split twice a week      Discussed risks, benefits, and side effects of medication.    Careful with holidays      Damaso Draper MD

## 2024-11-21 ENCOUNTER — TELEPHONE (OUTPATIENT)
Dept: OBGYN CLINIC | Facility: CLINIC | Age: 49
End: 2024-11-21

## 2024-12-04 ENCOUNTER — OFFICE VISIT (OUTPATIENT)
Dept: OBGYN CLINIC | Facility: CLINIC | Age: 49
End: 2024-12-04
Payer: MEDICARE

## 2024-12-04 VITALS
DIASTOLIC BLOOD PRESSURE: 60 MMHG | BODY MASS INDEX: 34.57 KG/M2 | SYSTOLIC BLOOD PRESSURE: 110 MMHG | HEIGHT: 65.5 IN | WEIGHT: 210 LBS

## 2024-12-04 DIAGNOSIS — Z01.419 ENCOUNTER FOR ANNUAL ROUTINE GYNECOLOGICAL EXAMINATION: Primary | ICD-10-CM

## 2024-12-04 PROCEDURE — 88175 CYTOPATH C/V AUTO FLUID REDO: CPT | Performed by: OBSTETRICS & GYNECOLOGY

## 2024-12-04 PROCEDURE — 87624 HPV HI-RISK TYP POOLED RSLT: CPT | Performed by: OBSTETRICS & GYNECOLOGY

## 2024-12-04 PROCEDURE — G0101 CA SCREEN;PELVIC/BREAST EXAM: HCPCS | Performed by: OBSTETRICS & GYNECOLOGY

## 2024-12-04 NOTE — PROGRESS NOTES
[facility]  Obstetrics and Gynecology  Annual Follow Up    Subjective:     Suzie Ferris is a 49 year old  female who presents with c/o   Chief Complaint   Patient presents with    Annual     The patient reports no menses for over 1 year. Noted no bleeding at all. No self breast exam concerns. Noted hot flashes daily bothersome at about 4/10. Due for pap. Normal mammogram 10/2024.     Review of Systems:  General: no complaints  Eyes: no complaints  Respiratory: no complaints  Cardiovascular: no complaints  GI: no complaints  : See HPI  Hematological/lymphatic: no complaints  Breast: no complaints  Psychiatric: no complaints  Endocrine:no complaints  Neurological: no complaints  Immunological: no complaints  Musculoskeletal:no complaints    HISTORY:  Past Medical History:    Anemia    Anxiety    Colon polyp    None in . REpeat CLN in .    Herpes simplex type 1 infection    Hyperlipidemia    Menorrhagia    Morbid obesity with BMI of 40.0-44.9, adult (HCC)    Obesity      Past Surgical History:   Procedure Laterality Date    Colonoscopy N/A 2019    Procedure: COLONOSCOPY;  Surgeon: POLLO Holland MD;  Location: [facility] ENDOSCOPY    D & c      x 2    Salpingectomy Bilateral 2017    Tubal ligation        Family History   Problem Relation Age of Onset    Diabetes Mother     Hypertension Mother     Diabetes Sister     Diabetes Father     Other (Other) Father       Social History     Socioeconomic History    Marital status: Single   Tobacco Use    Smoking status: Never    Smokeless tobacco: Never   Vaping Use    Vaping status: Never Used   Substance and Sexual Activity    Alcohol use: Yes     Comment: rarely    Drug use: No    Sexual activity: Not Currently     Partners: Male   Other Topics Concern    Blood Transfusions Yes     Social Drivers of Health     Financial Resource Strain: Not on File (2024)    Received from [facility]    Financial Resource Strain      Financial Resource Strain: 0   Food Insecurity: No Food Insecurity (10/14/2024)    Received from [facility]    Food Insecurity     Currently or in the past 3 months, have you worried your food would run out before you had money to buy more?: No     In the past 12 months, have you run out of food or been unable to get more?: No   Transportation Needs: No Transportation Needs (10/14/2024)    Received from [facility]    Transportation Needs     Medical Transportation Needs?: No   Physical Activity: Not on File (2024)    Received from [facility]    Physical Activity     Physical Activity: 0   Stress: Not on File (2024)    Received from [facility]    Stress     Stress: 0   Social Connections: Not on File (2024)    Received from [facility]    Social Connections     Connectedness: 0   Housing Stability: Not on File (2024)    Received from [facility]    Housing Stability     Housin           Objective:     Vitals:    24 1247   BP: 110/60   Weight: 210 lb (95.3 kg)   Height: 65.5\"         Body mass index is 34.41 kg/m².    Exam with DANIELLE Roca present:   GENERAL: well developed, well nourished, in no apparent distress, alert and orientated X 3  PSYCH: mood and affect stable   SKIN: no rashes, no lesions  LUNGS: respiration unlabored  CARDIOVASCULAR: no peripheral edema or varicosities, skin warm and dry  BREASTS: bilaterally nontender, no palpable masses, no nipple discharge, no skin changes, no axillary adenopathy  ABDOMEN: Soft, non distended; non tender, no masses  GYNE/:   External Genitalia: normal, no lesions, good perineal support  Urethra: meatus normal   Bladder: well supported  Vagina: normal mucosa, no lesions, normal discharge   Cervix: normal os, no lesions or bleeding  Cul-de-sac: normal  R/V: normal perineum  EXTREMITIES:  Normal range of motion, strength 5/5 walking.     Labs:  21 last pap: NILM HPV Negative    Imaging:  Reviewed  mammogram 10/1/24 at [facility].        Assessment:     Suzie Ferris is a 49 year old  female who presents for Annual.     Patient Active Problem List   Diagnosis    Class 1 obesity due to excess calories in adult    Snoring    DJD (degenerative joint disease) of knee    Iron deficiency anemia    Vitamin D deficiency    Anxiety    Encounter for therapeutic drug monitoring    Cervix incompetence    Iron deficiency    Menorrhagia with regular cycle    Panic attacks    Major depressive disorder with single episode, in partial remission (HCC)    Gastroesophageal reflux disease without esophagitis    Chronic idiopathic constipation    Polyp of sigmoid colon    Abnormal uterine bleeding    COVID-19    Class 1 obesity due to excess calories with serious comorbidity and body mass index (BMI) of 34.0 to 34.9 in adult    Mixed hyperlipidemia    Prediabetes    Thickened endometrium    Abnormal perimenopausal bleeding    Endometrial polyp    Fibroids, submucosal    Obesity (BMI 30-39.9)    Dyslipidemia    Lumbar spondylosis         Plan:     1. Encounter for annual routine gynecological examination  - Normal exam.  - Due for pap. Collected.   - Mammogram up to date.   - Recommend starting with Mee root 500-1500 mg per day for hot flashes. Follow up if desires more intervention.   - ThinPrep PAP Smear; Future  - Hpv Dna  High Risk , Thin Prep Collect; Future  - ThinPrep PAP Smear  - Hpv Dna  High Risk , Thin Prep Collect     All of the findings and plan were discussed with the patient.  She notes understanding and agrees with the plan of care.  All questions were answered to the best of my ability at this time.    RTC in 1 year or sooner if needed    Dr. Lai Hernandez MD    [facility]     This note was created by [employer] voice recognition. Errors in content may be related to improper recognition by the system; efforts to review and correct have been done but errors may still exist. Please be advised the primary purpose of  this note is for me to communicate medical care. Standard sentence structure is not always used. Medical terminology and medical abbreviations may be used. There may be grammatical, typographical, and automated fill ins that may have errors missed in proofreading.

## 2024-12-05 LAB — HPV E6+E7 MRNA CVX QL NAA+PROBE: NEGATIVE

## 2024-12-10 ENCOUNTER — TELEPHONE (OUTPATIENT)
Dept: NEUROLOGY | Facility: CLINIC | Age: 49
End: 2024-12-10

## 2024-12-20 ENCOUNTER — OFFICE VISIT (OUTPATIENT)
Dept: PHYSICAL MEDICINE AND REHAB | Facility: CLINIC | Age: 49
End: 2024-12-20
Payer: MEDICARE

## 2024-12-20 VITALS — WEIGHT: 210 LBS | BODY MASS INDEX: 34.57 KG/M2 | HEIGHT: 65.5 IN

## 2024-12-20 DIAGNOSIS — F41.0 PANIC ATTACKS: ICD-10-CM

## 2024-12-20 DIAGNOSIS — M25.561 ACUTE PAIN OF RIGHT KNEE: ICD-10-CM

## 2024-12-20 DIAGNOSIS — K21.9 GASTROESOPHAGEAL REFLUX DISEASE WITHOUT ESOPHAGITIS: ICD-10-CM

## 2024-12-20 DIAGNOSIS — M47.816 LUMBAR SPONDYLOSIS: Primary | ICD-10-CM

## 2024-12-20 DIAGNOSIS — E66.9 OBESITY (BMI 30-39.9): ICD-10-CM

## 2024-12-20 DIAGNOSIS — M15.9 GENERALIZED OSTEOARTHROSIS, INVOLVING MULTIPLE SITES: ICD-10-CM

## 2024-12-20 DIAGNOSIS — F32.4 MAJOR DEPRESSIVE DISORDER WITH SINGLE EPISODE, IN PARTIAL REMISSION (HCC): ICD-10-CM

## 2024-12-20 PROCEDURE — 99214 OFFICE O/P EST MOD 30 MIN: CPT | Performed by: PHYSICAL MEDICINE & REHABILITATION

## 2024-12-20 RX ORDER — MELOXICAM 15 MG/1
15 TABLET ORAL DAILY
Qty: 30 TABLET | Refills: 0 | Status: SHIPPED | OUTPATIENT
Start: 2024-12-20

## 2024-12-20 NOTE — PROGRESS NOTES
[facility]  Progress Note    CHIEF COMPLAINT:    Chief Complaint   Patient presents with    Follow - Up     LOV 7/3/24 pt is here with complaints of low back and right leg pain. States the pain started a few weeks ago with no known injury occurring. No n/t. Not taking any pain meds or muscle relaxer's. NO current physical therapy. Reports to having pins and needles and is intermittent Pain 6/10       History of Present Illness:  Suzie Ferris is a 49 year old female who presents today for follow up for symptoms of Low back and right leg pain.  I previously saw her for left leg pain due to osteoarthritis and a fall.  New symptoms started a few weeks ago without injury.  No numbness or tingling.    PAST MEDICAL HISTORY:  Past Medical History:    Anemia    Anxiety    Colon polyp    None in 2022. REpeat CLN in 2032.    Herpes simplex type 1 infection    Hyperlipidemia    Menorrhagia    Morbid obesity with BMI of 40.0-44.9, adult (HCC)    Obesity       SURGICAL HISTORY:  Past Surgical History:   Procedure Laterality Date    Colonoscopy N/A 9/24/2019    Procedure: COLONOSCOPY;  Surgeon: POLLO Holland MD;  Location: [facility] ENDOSCOPY    D & c      x 2    Salpingectomy Bilateral 03/2017    Tubal ligation         SOCIAL HISTORY:   Social History     Occupational History    Not on file   Tobacco Use    Smoking status: Never    Smokeless tobacco: Never   Vaping Use    Vaping status: Never Used   Substance and Sexual Activity    Alcohol use: Yes     Comment: rarely    Drug use: No    Sexual activity: Not Currently     Partners: Male       CURRENT MEDICATIONS:   Current Outpatient Medications   Medication Sig Dispense Refill    Meloxicam 15 MG Oral Tab Take 1 tablet (15 mg total) by mouth daily. 30 tablet 0    rosuvastatin 5 MG Oral Tab Take 1 tablet (5 mg total) by mouth nightly.      ALPRAZolam 0.5 MG Oral Tab Take 1 tablet (0.5 mg total) by mouth nightly as needed.      NON  FORMULARY [employer] food blood builder      Fluticasone Propionate 50 MCG/ACT Nasal Suspension APPLY 1 SPRAY IN EACH NOSTRIL DAILY. GENTLY BLOW NOSE PRIOR TO USE         ALLERGIES:   Allergies[1]        PHYSICAL EXAM:   Ht 65.5\"   Wt 210 lb (95.3 kg)   BMI 34.41 kg/m²     Body mass index is 34.41 kg/m².      General: No immediate distress  Extremities: No lower extremity edema bilaterally   Hips: full and painfree ROM   Knees: pain with right knee range of motion.  Neuro:   Cognition: alert & oriented x 3, attentive, able to follow 2 step commands, comprehention intact, spontaneous speech intact  Strength: Lower extremities have 5/5 strength  Sensation: Normal lower extremities  Reflexes: Normal lower extremities  SLR: neg        Data    Radiology Imaging:  A plain film x-ray of the right knee from 2023 shows mild osteoarthritis.      ASSESSMENT AND PLAN:  1. Acute pain of right knee  She probably has exacerbation of right knee osteoarthritis.  I started meloxicam.  She will consider aquatic exercise on her own.  Return in 4 weeks if no better    2. Generalized osteoarthrosis, involving multiple sites  She seems to have multiple joint pain, consider Cymbalta long-term    3. Lumbar spondylosis  Seems stable    4. Gastroesophageal reflux disease without esophagitis  No NSAIDs, limits treatment options    5. Obesity (BMI 30-39.9)  Negative comorbidity for lumbar and lower extremity conditions    6. Major depressive disorder with single episode, in partial remission (HCC)  Negative comorbidity for painful conditions    7. Panic attacks  Negative morbidity for painful conditions          The patient was in agreement with the assessment and plan.  All questions were answered.        Valentin Cantu MD  Physical Medicine and Rehabilitation/Sports Medicine  [facility]           [1] No Known Allergies

## 2025-01-28 ENCOUNTER — OFFICE VISIT (OUTPATIENT)
Dept: PODIATRY CLINIC | Facility: CLINIC | Age: 50
End: 2025-01-28

## 2025-01-28 VITALS — SYSTOLIC BLOOD PRESSURE: 94 MMHG | DIASTOLIC BLOOD PRESSURE: 60 MMHG | HEART RATE: 75 BPM

## 2025-01-28 DIAGNOSIS — G57.62 PLANTAR NEUROMA OF LEFT FOOT: Primary | ICD-10-CM

## 2025-01-28 DIAGNOSIS — M79.672 LEFT FOOT PAIN: ICD-10-CM

## 2025-01-28 PROCEDURE — 99214 OFFICE O/P EST MOD 30 MIN: CPT | Performed by: STUDENT IN AN ORGANIZED HEALTH CARE EDUCATION/TRAINING PROGRAM

## 2025-01-28 RX ORDER — TRIAMCINOLONE ACETONIDE 40 MG/ML
40 INJECTION, SUSPENSION INTRA-ARTICULAR; INTRAMUSCULAR ONCE
Status: COMPLETED | OUTPATIENT
Start: 2025-01-28 | End: 2025-01-28

## 2025-01-28 RX ORDER — DEXAMETHASONE SODIUM PHOSPHATE 4 MG/ML
4 VIAL (ML) INJECTION ONCE
Status: COMPLETED | OUTPATIENT
Start: 2025-01-28 | End: 2025-01-28

## 2025-01-28 RX ADMIN — TRIAMCINOLONE ACETONIDE 40 MG: 40 INJECTION, SUSPENSION INTRA-ARTICULAR; INTRAMUSCULAR at 16:36:00

## 2025-01-28 RX ADMIN — DEXAMETHASONE SODIUM PHOSPHATE 4 MG: 4 MG/ML VIAL (ML) INJECTION at 16:33:00

## 2025-01-28 NOTE — PROGRESS NOTES
Verbal order per Dr Patel, draw up 1ml of 1% Lidiocaine, 1ml of Dexamethasone Sodium Phosphate and 1 ml of Kenalog 40, for a    left foot neuroma cortisone injection

## 2025-01-28 NOTE — PROGRESS NOTES
[facility]  Progress Note      Suzie Ferris is a 49 year old female.   Chief Complaint   Patient presents with    Foot Pain     F/u left foot neuroma pain 7/10. Got cortisone injection on 04/30/2024 pain was relieved for about 8 months        HPI:     Patient is a pleasant 49-year-old female presents to clinic for evaluation of left second interspace neuroma pain.  Relates that the last cortisone injection she received in April 2024 provided her with relief for about 8 months.  Admits that in December she began experiencing pain again.  Patient would like to discuss another cortisone injection at this time.    Allergies: Patient has no known allergies.    Current Outpatient Medications   Medication Sig Dispense Refill    Meloxicam 15 MG Oral Tab Take 1 tablet (15 mg total) by mouth daily. 30 tablet 0    rosuvastatin 5 MG Oral Tab Take 1 tablet (5 mg total) by mouth nightly.      ALPRAZolam 0.5 MG Oral Tab Take 1 tablet (0.5 mg total) by mouth nightly as needed.      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 GENTLY BLOW NOSE PRIOR TO USE        Past Medical History:    Anemia    Anxiety    Colon polyp    None in 2022. REpeat CLN in 2032.    Herpes simplex type 1 infection    Hyperlipidemia    Menorrhagia    Morbid obesity with BMI of 40.0-44.9, adult (HCC)    Obesity      Past Surgical History:   Procedure Laterality Date    Colonoscopy N/A 9/24/2019    Procedure: COLONOSCOPY;  Surgeon: POLLO Holland MD;  Location: [facility] ENDOSCOPY    D & c      x 2    Salpingectomy Bilateral 03/2017    Tubal ligation        Family History   Problem Relation Age of Onset    Diabetes Mother     Hypertension Mother     Diabetes Sister     Diabetes Father     Other (Other) Father       Social History     Socioeconomic History    Marital status: Single   Tobacco Use    Smoking status: Never    Smokeless tobacco: Never   Vaping Use     Vaping status: Never Used   Substance and Sexual Activity    Alcohol use: Yes     Comment: rarely    Drug use: No    Sexual activity: Not Currently     Partners: Male   Other Topics Concern    Blood Transfusions Yes           REVIEW OF SYSTEMS:     Denies nause, fever, chills  No calf pain  Denies chest pain or SOB      EXAM:   BP 94/60 (BP Location: Left arm, Patient Position: Sitting, Cuff Size: adult)   Pulse 75   GENERAL: well developed, well nourished, in no apparent distress  EXTREMITIES:   1. Integument: Normal skin temperature and turgor  2. Vascular: Dorsalis pedis two out of four bilateral and posterior tibial pulses two out of   four bilateral, capillary refill normal.   3. Musculoskeletal: All muscle groups are graded 5 out of 5 in the foot and ankle.  Palpable left second interdigital Celia's click.  Pain with palpation to left third interspace.   4. Neurological: Normal sharp dull sensation; reflexes normal.             ASSESSMENT AND PLAN:   Diagnoses and all orders for this visit:    Plantar neuroma of left foot  -     dexamethasone (Decadron) 4 MG/ML injection 4 mg  -     triamcinolone acetonide (Kenalog-40) 40 MG/ML injection 40 mg    Left foot pain  -     dexamethasone (Decadron) 4 MG/ML injection 4 mg  -     triamcinolone acetonide (Kenalog-40) 40 MG/ML injection 40 mg        Plan:       Discussed both the pathology and etiology of a neuroma condition.   Discussed with patient regarding the biomechanical factors that cause or aggravate the neuroma condition.   Discussed various treatment options with patient which consists of continued observation vs surgical intervention or neuroma injection   Advised on purchasing metatarsal pad to offload forefoot in the shoe.  Recommend steroid injection this date.    Prepped area with alcohol. The second interspace of left  foot  was injected through the dorsal aspect using  1cc Lidocaine  Plain, 1cc of Dexamethasone and 1cc of Kenalog 40 . Pt tolerated  the procedure well hemostasis achieved and injection site covered with bandaid. . There were no complications. Patient educated on steroid flare up along with acute signs of infection and advised to seek immediate medical attention if symptoms arise    RTC in 3 months     The patient indicates understanding of these issues and agrees to the plan.        Kayy Resendez DPM

## 2025-02-05 ENCOUNTER — OFFICE VISIT (OUTPATIENT)
Dept: PHYSICAL MEDICINE AND REHAB | Facility: CLINIC | Age: 50
End: 2025-02-05
Payer: MEDICARE

## 2025-02-05 ENCOUNTER — TELEPHONE (OUTPATIENT)
Dept: PHYSICAL MEDICINE AND REHAB | Facility: CLINIC | Age: 50
End: 2025-02-05

## 2025-02-05 VITALS — OXYGEN SATURATION: 99 % | BODY MASS INDEX: 35 KG/M2 | WEIGHT: 215 LBS | HEART RATE: 55 BPM

## 2025-02-05 DIAGNOSIS — E66.9 OBESITY (BMI 30-39.9): ICD-10-CM

## 2025-02-05 DIAGNOSIS — M15.9 GENERALIZED OSTEOARTHROSIS, INVOLVING MULTIPLE SITES: ICD-10-CM

## 2025-02-05 DIAGNOSIS — M17.11 PRIMARY OSTEOARTHRITIS OF RIGHT KNEE: Primary | ICD-10-CM

## 2025-02-05 DIAGNOSIS — F41.0 PANIC ATTACKS: ICD-10-CM

## 2025-02-05 DIAGNOSIS — K21.9 GASTROESOPHAGEAL REFLUX DISEASE WITHOUT ESOPHAGITIS: ICD-10-CM

## 2025-02-05 PROCEDURE — 99214 OFFICE O/P EST MOD 30 MIN: CPT | Performed by: PHYSICAL MEDICINE & REHABILITATION

## 2025-02-05 RX ORDER — MELOXICAM 15 MG/1
15 TABLET ORAL DAILY
Qty: 30 TABLET | Refills: 0 | Status: SHIPPED | OUTPATIENT
Start: 2025-02-05

## 2025-02-05 NOTE — PROGRESS NOTES
[facility]  Progress Note    CHIEF COMPLAINT:    Chief Complaint   Patient presents with    Follow - Up     LOV 12/20/24. F/U on low back and right leg pain. Pt states pain has worsened - throbbing, aching, tightness - radiates to top of R foot. R foot has numbness and cramping. R knee swelling as well, started ~ 2 weeks ago. No relief with mobic. Ibuprofen provides relief for about 3 hours. Pt denies any recent PT. CPL: 10/10.        History of Present Illness:  Suzie Ferris is a 49 year old female who presents today for follow up for symptoms of low back and right knee pain.  I last saw her in late December and recommended aquatic exercise.  She has not done this.  She has chronic right knee pain and has been treated with injections in the past.  No new injuries.    PAST MEDICAL HISTORY:  Past Medical History:    Anemia    Anxiety    Colon polyp    None in 2022. REpeat CLN in 2032.    Herpes simplex type 1 infection    Hyperlipidemia    Menorrhagia    Morbid obesity with BMI of 40.0-44.9, adult (HCC)    Obesity       SURGICAL HISTORY:  Past Surgical History:   Procedure Laterality Date    Colonoscopy N/A 9/24/2019    Procedure: COLONOSCOPY;  Surgeon: POLLO Holland MD;  Location: [facility] ENDOSCOPY    D & c      x 2    Salpingectomy Bilateral 03/2017    Tubal ligation         SOCIAL HISTORY:   Social History     Occupational History    Not on file   Tobacco Use    Smoking status: Never    Smokeless tobacco: Never   Vaping Use    Vaping status: Never Used   Substance and Sexual Activity    Alcohol use: Yes     Alcohol/week: 3.0 standard drinks of alcohol     Types: 3 Shots of liquor per week     Comment: rarely    Drug use: No    Sexual activity: Not Currently     Partners: Male       CURRENT MEDICATIONS:   Current Outpatient Medications   Medication Sig Dispense Refill    Meloxicam 15 MG Oral Tab Take 1 tablet (15 mg total) by mouth daily. 30 tablet 0     rosuvastatin 5 MG Oral Tab Take 1 tablet (5 mg total) by mouth nightly.      ALPRAZolam 0.5 MG Oral Tab Take 1 tablet (0.5 mg total) by mouth nightly as needed.      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 GENTLY BLOW NOSE PRIOR TO USE         ALLERGIES:   Allergies[1]        PHYSICAL EXAM:   Pulse 55   Wt 215 lb (97.5 kg)   SpO2 99%   BMI 35.23 kg/m²     Body mass index is 35.23 kg/m².      General: No immediate distress  Extremities: No lower extremity edema bilaterally   Knees: Right knee hyperextends without pain, flexion to 90 degrees with pain at end range, no obvious effusion  Neuro:   Cognition: alert & oriented x 3, attentive, able to follow 2 step commands, comprehention intact, spontaneous speech intact  Strength: Lower extremities have 5/5 strength  Sensation: Normal lower extremities        Data    Radiology Imaging:  A plain film x-ray of the right knee from 2023 shows mild osteoarthritis.       ASSESSMENT AND PLAN:  1. Acute pain of right knee  We discussed aquatic exercise again.  She will look into it.  Regarding the knee I recommended a knee injection.  She will eventually need a knee replacement and is losing weight so that is becoming more of a real possibility.  She has had viscosupplementation.  We set her up for a series of Synvisc injections and she will return for those.    2. Generalized osteoarthrosis, involving multiple sites  Still considering Cymbalta    3. Gastroesophageal reflux disease without esophagitis  No NSAIDs, limits treatment options    4. Obesity (BMI 30-39.9)  Negative comorbidity for knee osteoarthritis, but she is improving.    5. Panic attacks  Negative comorbidity for painful conditions        RTC for knee injections      The patient was in agreement with the assessment and plan.  All questions were answered.        Valentin Cantu MD  Physical Medicine and  Rehabilitation/Sports Medicine  [facility]           [1] No Known Allergies

## 2025-02-05 NOTE — TELEPHONE ENCOUNTER
Per CMS Guidelines -no authorization is required for Right knee injection x3 with synvisc   CPT codes: 39729 x's 3,  x's 3       Status: Authorization is not required based on medical necessity however is not a guarantee of payment and may be subject to review once claim is submitted-Covered Benefit.

## 2025-02-06 NOTE — TELEPHONE ENCOUNTER
Patient requesting to speak to someone to confirm the scheduling of her injections according to her when the injections dates are 2/10,17 & 24 not Malik. Please call back to clarify.

## 2025-02-06 NOTE — TELEPHONE ENCOUNTER
Marcello Meo needs refill of   Requested Prescriptions     Pending Prescriptions Disp Refills    levothyroxine (SYNTHROID) 125 MCG tablet 45 tablet 1     Sig: Take 1 tablet by mouth Daily Alternating every other day with 137 mcg       Last Filled on:      Last Visit Date:  Visit date not found    Next Visit Date:  Visit date not found    Spoke with patient to confirm procedures are in March due to not having any appointments back to back in Feb.    Patient verbalized understanding.

## 2025-03-10 ENCOUNTER — OFFICE VISIT (OUTPATIENT)
Dept: PHYSICAL MEDICINE AND REHAB | Facility: CLINIC | Age: 50
End: 2025-03-10
Payer: MEDICARE

## 2025-03-10 DIAGNOSIS — M17.11 PRIMARY OSTEOARTHRITIS OF RIGHT KNEE: Primary | ICD-10-CM

## 2025-03-10 NOTE — PROCEDURES
Knee injection with Synvisc #1/3  Location: right  After discussing benefits and possible side effects, we proceeded with a knee injection. The patient was consented.  The patient was placed in supine, and the lateral knee was palpated. The skin was sterilely prepped.  Via a lateral approach a 22-gauge needle was introduced into the knee joint. A total of 2 cc of Synvisc was injected into the knee.     The patient tolerated the procedure well without adverse effects.

## 2025-03-19 ENCOUNTER — OFFICE VISIT (OUTPATIENT)
Dept: PHYSICAL MEDICINE AND REHAB | Facility: CLINIC | Age: 50
End: 2025-03-19
Payer: MEDICARE

## 2025-03-19 DIAGNOSIS — M17.11 PRIMARY OSTEOARTHRITIS OF RIGHT KNEE: Primary | ICD-10-CM

## 2025-03-20 NOTE — PROCEDURES
Knee injection with Synvisc #2/3  Location: right  After discussing benefits and possible side effects, we proceeded with a knee injection. The patient was consented.  The patient was placed in supine, and the lateral knee was palpated. The skin was sterilely prepped.  Via a lateral approach a 22-gauge needle was introduced into the knee joint. A total of 2 cc of Synvisc was injected into the knee.     The patient tolerated the procedure well without adverse effects.

## 2025-03-24 ENCOUNTER — OFFICE VISIT (OUTPATIENT)
Dept: PHYSICAL MEDICINE AND REHAB | Facility: CLINIC | Age: 50
End: 2025-03-24
Payer: MEDICARE

## 2025-03-24 DIAGNOSIS — M15.9 GENERALIZED OSTEOARTHROSIS, INVOLVING MULTIPLE SITES: ICD-10-CM

## 2025-03-24 DIAGNOSIS — M17.11 PRIMARY OSTEOARTHRITIS OF RIGHT KNEE: Primary | ICD-10-CM

## 2025-03-24 NOTE — PROCEDURES
Knee injection with Synvisc #3/3  Location: right  After discussing benefits and possible side effects, we proceeded with a knee injection. The patient was consented.  The patient was placed in supine, and the lateral knee was palpated. The skin was sterilely prepped.  Via a lateral approach a 22-gauge needle was introduced into the knee joint. A total of 2 cc of Synvisc was injected into the knee.     The patient tolerated the procedure well without adverse effects.

## 2025-03-25 NOTE — PROGRESS NOTES
DX:  Patellofemoral syndrome, right (M22.2X1)  Authorized # of Visits:  6/8 (per POC)         Next MD visit: none scheduled  Fall Risk: standard         Precautions: n/a         Medication Changes since last visit?: No  Subjective: Pt states that she did H
yes

## 2025-05-06 ENCOUNTER — OFFICE VISIT (OUTPATIENT)
Dept: PODIATRY CLINIC | Facility: CLINIC | Age: 50
End: 2025-05-06
Payer: MEDICARE

## 2025-05-06 VITALS — DIASTOLIC BLOOD PRESSURE: 68 MMHG | SYSTOLIC BLOOD PRESSURE: 109 MMHG | HEART RATE: 85 BPM

## 2025-05-06 DIAGNOSIS — M79.672 LEFT FOOT PAIN: ICD-10-CM

## 2025-05-06 DIAGNOSIS — G57.62 PLANTAR NEUROMA OF LEFT FOOT: Primary | ICD-10-CM

## 2025-05-06 PROCEDURE — 99214 OFFICE O/P EST MOD 30 MIN: CPT | Performed by: STUDENT IN AN ORGANIZED HEALTH CARE EDUCATION/TRAINING PROGRAM

## 2025-05-06 RX ORDER — TRIAMCINOLONE ACETONIDE 40 MG/ML
40 INJECTION, SUSPENSION INTRA-ARTICULAR; INTRAMUSCULAR ONCE
Status: COMPLETED | OUTPATIENT
Start: 2025-05-06 | End: 2025-05-06

## 2025-05-06 RX ORDER — DEXAMETHASONE SODIUM PHOSPHATE 4 MG/ML
4 VIAL (ML) INJECTION ONCE
Status: COMPLETED | OUTPATIENT
Start: 2025-05-06 | End: 2025-05-06

## 2025-05-06 RX ADMIN — TRIAMCINOLONE ACETONIDE 40 MG: 40 INJECTION, SUSPENSION INTRA-ARTICULAR; INTRAMUSCULAR at 16:29:00

## 2025-05-06 RX ADMIN — DEXAMETHASONE SODIUM PHOSPHATE 4 MG: 4 MG/ML VIAL (ML) INJECTION at 16:28:00

## 2025-05-19 ENCOUNTER — OFFICE VISIT (OUTPATIENT)
Dept: SURGERY | Facility: CLINIC | Age: 50
End: 2025-05-19
Payer: MEDICARE

## 2025-05-19 VITALS
HEART RATE: 70 BPM | SYSTOLIC BLOOD PRESSURE: 108 MMHG | WEIGHT: 210 LBS | OXYGEN SATURATION: 99 % | DIASTOLIC BLOOD PRESSURE: 70 MMHG | HEIGHT: 65.5 IN | BODY MASS INDEX: 34.57 KG/M2

## 2025-05-19 DIAGNOSIS — E78.5 DYSLIPIDEMIA: Primary | ICD-10-CM

## 2025-05-19 DIAGNOSIS — E66.9 OBESITY (BMI 30-39.9): ICD-10-CM

## 2025-05-19 DIAGNOSIS — Z51.81 ENCOUNTER FOR THERAPEUTIC DRUG MONITORING: ICD-10-CM

## 2025-05-19 DIAGNOSIS — R73.03 PREDIABETES: ICD-10-CM

## 2025-05-19 PROCEDURE — 99214 OFFICE O/P EST MOD 30 MIN: CPT | Performed by: INTERNAL MEDICINE

## 2025-05-19 NOTE — PROGRESS NOTES
Patient:  Suzie Ferris  :      1975  MRN:      [id number]    Chief Complaint:    Chief Complaint   Patient presents with    Follow - Up    Weight Management       SUBJECTIVE     History of Present Illness:  Suzie is being seen today for a follow-up for non-surgical weight loss.      Difficulty tolerating Qsymia- palpitations.     Ongoing knee pain.     Past Medical History:   Past Medical History:    Anemia    Anxiety    Colon polyp    None in . REpeat CLN in .    Herpes simplex type 1 infection    Hyperlipidemia    Menorrhagia    Morbid obesity with BMI of 40.0-44.9, adult (HCC)    Obesity      Comorbidities:  Dyslipidemia     OBJECTIVE     Vitals: /70   Pulse 70   Ht 5' 5.5\" (1.664 m)   Wt 210 lb (95.3 kg)   SpO2 99%   BMI 34.41 kg/m²     Initial weight loss: +02   Total weight loss: -38   Start weight: 248    Wt Readings from Last 3 Encounters:   25 210 lb (95.3 kg)   25 215 lb (97.5 kg)   24 210 lb (95.3 kg)       Patient Medications:    Current Outpatient Medications   Medication Sig Dispense Refill    rosuvastatin 5 MG Oral Tab Take 1 tablet (5 mg total) by mouth nightly.      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 GENTLY BLOW NOSE PRIOR TO USE       Allergies:  Patient has no known allergies.     Social History: Reviewed    Surgical History:    Past Surgical History:   Procedure Laterality Date    Colonoscopy N/A 2019    Procedure: COLONOSCOPY;  Surgeon: POLLO Holland MD;  Location: [facility] ENDOSCOPY    D & c      x 2    Salpingectomy Bilateral 2017    Tubal ligation       Family History:    Family History   Problem Relation Age of Onset    Diabetes Mother     Hypertension Mother     Diabetes Sister     Diabetes Father     Other (Other) Father        Food Journal  Reviewed and Discussed:       Patient has a Food Journal?: no   Patient is reading nutrition  labels?  yes  Average Caloric Intake:     Average CHO Intake:   Is patient exercising? yes less due to knee pain  Type of exercise? Walking    Eating Habits  Patient states the following:  Eats 3 meal(s) per day  Length of time it takes to consume a meal:    # of snacks per day: as stated below   Type of snacks: as stated below   Amount of soda consumption per day:  Rare   Amount of water (in ounces) per day: adequate- adds lemon/lime  Toughest challenge:  knee pain, medication side effects     Nutritional Goals  Eat 3-4 cups of fresh fruits or vegetables daily    Behavior Modifications Reviewed and Discussed  Eat breakfast, Eat 3 meals per day, Plan meals in advance, Read nutrition labels, Drink 64 oz of water per day, Maintain a daily food journal, No drinking 30 minutes before or after meals, Utlize portion control strategies to reduce calorie intake, Identify triggers for eating and manage cues and Eat slowly and take 20 to 30 minutes to complete each meal    Exercise Goals Reviewed and Discussed    Aim for 150 minutes moderate level exercise weekly with 2-3 days strength training    ROS:    Review of Systems   Constitutional: Negative.    Respiratory: Negative.     Cardiovascular: Negative.    Gastrointestinal: Negative.    Genitourinary: Negative.    Musculoskeletal: Negative.    Skin: Negative.    Neurological: Negative.        Physical Exam:   General appearance: alert, appears stated age, cooperative and obese  Head: Normocephalic, without obvious abnormality, atraumatic  Neck: no adenopathy, no carotid bruit, no JVD, supple, symmetrical, trachea midline and thyroid not enlarged, symmetric, no tenderness/mass/nodules  Lungs: clear to auscultation bilaterally  Heart: S1, S2 normal, no murmur, click, rub or gallop, regular rate and rhythm  Abdomen: soft, non-tender; bowel sounds normal; no masses,  no organomegaly and abdomen obese   Extremities: intact, no edema   Pulses: 2+ and symmetric  Skin: intact    Neurologic: Grossly normal    ASSESSMENT     Encounter Diagnosis(ses):   Encounter Diagnoses   Name Primary?    Dyslipidemia Yes    Prediabetes     Encounter for therapeutic drug monitoring     Obesity (BMI 30-39.9)          PLAN       Iron deficiency anemia: Continue supplement.    DYSLIPIDEMIA: Improved on statin. Recommend dietary changes and lifestyle modifications as discussed below. Monitor.       Lab Results   Component Value Date/Time    CHOLEST 173 01/09/2024 07:45 AM    LDL 73 01/09/2024 07:45 AM    HDL 91 (H) 01/09/2024 07:45 AM    TRIG 44 01/09/2024 07:45 AM    VLDL 7 01/09/2024 07:45 AM     OBESITY:  PREDIABETES:    Goals for next month:  1. Keep a food log.  2. Drink 48-64 ounces of non-caloric beverages per day. No fruit juices or regular soda.  3. Increase activity-upper body exercises, walk 10 minutes per day.  4. Increase fruit and vegetable servings to 5-6 per day.        Denies personal or family hx medullary thyroid CA, endocrine neoplasia syndrome, pancreatitis hx, suicidal ideation. No renal impairment, severe GI disease, diabetes, pancreatitis risks noted.    Wegovy not covered.     Semiglutide: tolerating well  Refill at current dose  May split twice a week      Discussed risks, benefits, and side effects of medication.    Careful with holidays      Damaso Draper MD

## 2025-07-16 ENCOUNTER — OFFICE VISIT (OUTPATIENT)
Dept: OBGYN CLINIC | Facility: CLINIC | Age: 50
End: 2025-07-16
Payer: MEDICARE

## 2025-07-16 VITALS
HEIGHT: 65.5 IN | BODY MASS INDEX: 33.22 KG/M2 | WEIGHT: 201.81 LBS | DIASTOLIC BLOOD PRESSURE: 64 MMHG | SYSTOLIC BLOOD PRESSURE: 110 MMHG

## 2025-07-16 DIAGNOSIS — N89.8 VAGINAL DRYNESS: ICD-10-CM

## 2025-07-16 DIAGNOSIS — Z01.419 ENCOUNTER FOR ANNUAL ROUTINE GYNECOLOGICAL EXAMINATION: Primary | ICD-10-CM

## 2025-07-16 PROCEDURE — 87624 HPV HI-RISK TYP POOLED RSLT: CPT | Performed by: OBSTETRICS & GYNECOLOGY

## 2025-07-16 PROCEDURE — G0101 CA SCREEN;PELVIC/BREAST EXAM: HCPCS | Performed by: OBSTETRICS & GYNECOLOGY

## 2025-07-16 PROCEDURE — 88175 CYTOPATH C/V AUTO FLUID REDO: CPT | Performed by: OBSTETRICS & GYNECOLOGY

## 2025-07-16 RX ORDER — ESTRADIOL 0.1 MG/G
0.5 CREAM VAGINAL NIGHTLY
Qty: 42.5 G | Refills: 0 | Status: SHIPPED | OUTPATIENT
Start: 2025-07-16

## 2025-07-16 NOTE — PATIENT INSTRUCTIONS
Today we discussed how you occasionally get an abnormal vaginal odor. I recommended vaginal boric acid suppositories. They are over-the-counter and are 600 mg suppositories. When you get the odor you can use the pills in your vagina only a few nights a week.

## 2025-07-16 NOTE — PROGRESS NOTES
[facility]  Obstetrics and Gynecology  Annual Follow Up    Subjective:     Suzie Ferris is a 50 year old  female who presents with c/o   Chief Complaint   Patient presents with    Annual       The patient reports no bleeding since menopause. Menopause symptoms are not bad. Noted some irregular vaginal pains and dryness. Occasional abnormal smell. Recommend vaginal estrogen and as needed vaginal boric acid suppositories.     No self breast exam concerns.     Review of Systems:  General: no complaints  Eyes: no complaints  Respiratory: no complaints  Cardiovascular: no complaints  GI: no complaints  : See HPI  Hematological/lymphatic: no complaints  Breast: no complaints  Psychiatric: no complaints  Endocrine:no complaints  Neurological: no complaints  Immunological: no complaints  Musculoskeletal:no complaints    HISTORY:  Past Medical History[1]   Past Surgical History[2]   Family History[3]   Short Social Hx on File[4]        Objective:     Vitals:    25 1012   BP: 110/64   Weight: 201 lb 12.8 oz (91.5 kg)   Height: 65.5\"         Body mass index is 33.07 kg/m².    Exam with DANIELLE Roca present:   GENERAL: well developed, well nourished, in no apparent distress, alert and orientated X 3  PSYCH: mood and affect stable   SKIN: no rashes, no lesions  LUNGS: respiration unlabored  CARDIOVASCULAR: no peripheral edema or varicosities, skin warm and dry  BREASTS: bilaterally nontender, no palpable masses, no nipple discharge, no skin changes, no axillary adenopathy  ABDOMEN: Soft, non distended; non tender, no masses  GYNE/:   External Genitalia: normal, no lesions, good perineal support  Urethra: meatus normal   Bladder: well supported  Vagina: normal mucosa, no lesions, normal discharge   Cervix: normal os, no lesions or bleeding  Cul-de-sac: normal  R/V: normal perineum, no hemorrhoids  EXTREMITIES:  Normal range of motion, strength 5/5 walking.      Labs:  NILM HPV negative pap 2024.     Imaging:  10/1/24 Mammogram Normal at [facility].        Assessment:     Suzie Ferris is a 50 year old  female who presents for Annual.     Problem List[5]      Plan:     1. Encounter for annual routine gynecological examination  - Normal exam.   - Pap desired and collected on exam.   - as needed Vaginal boric acid.   - ThinPrep PAP Smear; Future  - Hpv Dna  High Risk , Thin Prep Collect; Future  - ThinPrep PAP Smear  - Hpv Dna  High Risk , Thin Prep Collect    2. Vaginal dryness  - Nightly for 2 weeks then 1-2 times per week as maintenance.   - estradiol 0.1 MG/GM Vaginal Cream; Place 0.5 g vaginally at bedtime.  Dispense: 42.5 g; Refill: 0     All of the findings and plan were discussed with the patient.  She notes understanding and agrees with the plan of care.  All questions were answered to the best of my ability at this time.    RTC in 1 year or sooner if needed    Dr. Lai Hernandez MD    [facility]     This note was created by [employer] voice recognition. Errors in content may be related to improper recognition by the system; efforts to review and correct have been done but errors may still exist. Please be advised the primary purpose of this note is for me to communicate medical care. Standard sentence structure is not always used. Medical terminology and medical abbreviations may be used. There may be grammatical, typographical, and automated fill ins that may have errors missed in proofreading.           [1]   Past Medical History:   Anemia    Anxiety    Colon polyp    None in . REpeat CLN in .    Herpes simplex type 1 infection    Hyperlipidemia    Menorrhagia    Morbid obesity with BMI of 40.0-44.9, adult (HCC)    Obesity   [2]   Past Surgical History:  Procedure Laterality Date    Colonoscopy N/A 2019    Procedure: COLONOSCOPY;  Surgeon: POLLO Holland MD;  Location: [facility] ENDOSCOPY    D & c      x 2          Salpingectomy Bilateral  2017    Tubal ligation     [3]   Family History  Problem Relation Age of Onset    Diabetes Mother     Hypertension Mother     Diabetes Sister     Diabetes Father     Other (Other) Father    [4]   Social History  Socioeconomic History    Marital status: Single   Tobacco Use    Smoking status: Never    Smokeless tobacco: Never   Vaping Use    Vaping status: Never Used   Substance and Sexual Activity    Alcohol use: Yes     Alcohol/week: 3.0 standard drinks of alcohol     Types: 3 Shots of liquor per week     Comment: rarely    Drug use: No    Sexual activity: Not Currently     Partners: Male   Other Topics Concern    Blood Transfusions Yes     Social Drivers of Health     Food Insecurity: No Food Insecurity (10/14/2024)    Received from [facility]    Food Insecurity     Currently or in the past 3 months, have you worried your food would run out before you had money to buy more?: No     In the past 12 months, have you run out of food or been unable to get more?: No   Transportation Needs: No Transportation Needs (10/14/2024)    Received from [facility]    Transportation Needs     Currently or in the past 3 months, has lack of transportation kept you from medical appointments, getting food or medicine, or providing care to a family member?: No   Stress: Not on File (2024)    Received from [facility]    Stress     Stress: 0   Housing Stability: Not on File (2024)    Received from [facility]    Housing Stability     Housin   [5]   Patient Active Problem List  Diagnosis    Class 1 obesity due to excess calories in adult    Snoring    DJD (degenerative joint disease) of knee    Iron deficiency anemia    Vitamin D deficiency    Anxiety    Encounter for therapeutic drug monitoring    Cervix incompetence    Iron deficiency    Menorrhagia with regular cycle    Panic attacks    Major depressive disorder with single episode, in partial remission    Gastroesophageal reflux disease  without esophagitis    Chronic idiopathic constipation    Polyp of sigmoid colon    Abnormal uterine bleeding    COVID-19    Class 1 obesity due to excess calories with serious comorbidity and body mass index (BMI) of 34.0 to 34.9 in adult    Mixed hyperlipidemia    Prediabetes    Thickened endometrium    Abnormal perimenopausal bleeding    Endometrial polyp    Fibroids, submucosal    Obesity (BMI 30-39.9)    Dyslipidemia    Lumbar spondylosis    Generalized osteoarthrosis, involving multiple sites

## 2025-07-17 LAB — HPV E6+E7 MRNA CVX QL NAA+PROBE: NEGATIVE

## 2025-07-19 ENCOUNTER — RESULTS FOLLOW-UP (OUTPATIENT)
Dept: OBGYN CLINIC | Facility: CLINIC | Age: 50
End: 2025-07-19

## 2025-07-21 ENCOUNTER — HOSPITAL ENCOUNTER (EMERGENCY)
Facility: HOSPITAL | Age: 50
Discharge: HOME | End: 2025-07-22
Attending: EMERGENCY MEDICINE
Payer: MEDICARE

## 2025-07-21 ENCOUNTER — APPOINTMENT (OUTPATIENT)
Dept: CT IMAGING | Facility: HOSPITAL | Age: 50
End: 2025-07-21
Attending: EMERGENCY MEDICINE
Payer: MEDICARE

## 2025-07-21 DIAGNOSIS — M54.2 NECK PAIN: Primary | ICD-10-CM

## 2025-07-21 DIAGNOSIS — R20.2 NUMBNESS AND TINGLING: ICD-10-CM

## 2025-07-21 DIAGNOSIS — R20.0 NUMBNESS AND TINGLING: ICD-10-CM

## 2025-07-21 LAB
ANION GAP SERPL CALC-SCNC: 6 MMOL/L (ref 0–18)
B-HCG UR QL: NEGATIVE
BASOPHILS # BLD AUTO: 0.04 X10(3) UL (ref 0–0.2)
BASOPHILS NFR BLD AUTO: 0.7 %
BUN BLD-MCNC: 13 MG/DL (ref 9–23)
BUN/CREAT SERPL: 15.1 (ref 10–20)
CALCIUM BLD-MCNC: 9.6 MG/DL (ref 8.7–10.4)
CHLORIDE SERPL-SCNC: 108 MMOL/L (ref 98–112)
CO2 SERPL-SCNC: 29 MMOL/L (ref 21–32)
CREAT BLD-MCNC: 0.86 MG/DL (ref 0.55–1.02)
DEPRECATED RDW RBC AUTO: 44.2 FL (ref 35.1–46.3)
EGFRCR SERPLBLD CKD-EPI 2021: 82 ML/MIN/1.73M2 (ref 60–?)
EOSINOPHIL # BLD AUTO: 0.17 X10(3) UL (ref 0–0.7)
EOSINOPHIL NFR BLD AUTO: 3.2 %
ERYTHROCYTE [DISTWIDTH] IN BLOOD BY AUTOMATED COUNT: 12.8 % (ref 11–15)
GLUCOSE BLD-MCNC: 85 MG/DL (ref 70–99)
HCT VFR BLD AUTO: 39.6 % (ref 35–48)
HGB BLD-MCNC: 13 G/DL (ref 12–16)
IMM GRANULOCYTES # BLD AUTO: 0.01 X10(3) UL (ref 0–1)
IMM GRANULOCYTES NFR BLD: 0.2 %
LYMPHOCYTES # BLD AUTO: 1.78 X10(3) UL (ref 1–4)
LYMPHOCYTES NFR BLD AUTO: 33.3 %
MCH RBC QN AUTO: 31 PG (ref 26–34)
MCHC RBC AUTO-ENTMCNC: 32.8 G/DL (ref 31–37)
MCV RBC AUTO: 94.3 FL (ref 80–100)
MONOCYTES # BLD AUTO: 0.43 X10(3) UL (ref 0.1–1)
MONOCYTES NFR BLD AUTO: 8.1 %
NEUTROPHILS # BLD AUTO: 2.91 X10 (3) UL (ref 1.5–7.7)
NEUTROPHILS # BLD AUTO: 2.91 X10(3) UL (ref 1.5–7.7)
NEUTROPHILS NFR BLD AUTO: 54.5 %
OSMOLALITY SERPL CALC.SUM OF ELEC: 295 MOSM/KG (ref 275–295)
PLATELET # BLD AUTO: 223 10(3)UL (ref 150–450)
POTASSIUM SERPL-SCNC: 4 MMOL/L (ref 3.5–5.1)
RBC # BLD AUTO: 4.2 X10(6)UL (ref 3.8–5.3)
SODIUM SERPL-SCNC: 143 MMOL/L (ref 136–145)
TROPONIN I SERPL HS-MCNC: <3 NG/L (ref ?–34)
WBC # BLD AUTO: 5.3 X10(3) UL (ref 4–11)

## 2025-07-21 PROCEDURE — 70496 CT ANGIOGRAPHY HEAD: CPT | Performed by: EMERGENCY MEDICINE

## 2025-07-21 PROCEDURE — 81025 URINE PREGNANCY TEST: CPT

## 2025-07-21 PROCEDURE — 84484 ASSAY OF TROPONIN QUANT: CPT | Performed by: EMERGENCY MEDICINE

## 2025-07-21 PROCEDURE — 93005 ELECTROCARDIOGRAM TRACING: CPT

## 2025-07-21 PROCEDURE — 99285 EMERGENCY DEPT VISIT HI MDM: CPT

## 2025-07-21 PROCEDURE — 80048 BASIC METABOLIC PNL TOTAL CA: CPT | Performed by: EMERGENCY MEDICINE

## 2025-07-21 PROCEDURE — 70498 CT ANGIOGRAPHY NECK: CPT | Performed by: EMERGENCY MEDICINE

## 2025-07-21 PROCEDURE — 96374 THER/PROPH/DIAG INJ IV PUSH: CPT

## 2025-07-21 PROCEDURE — 93010 ELECTROCARDIOGRAM REPORT: CPT

## 2025-07-21 PROCEDURE — 85025 COMPLETE CBC W/AUTO DIFF WBC: CPT | Performed by: EMERGENCY MEDICINE

## 2025-07-21 NOTE — ED INITIAL ASSESSMENT (HPI)
Patient complains of headache with R sided neck pain, states the neck started a week ago, states three days ago she began having tingling to her R arm and a headache, also states she has sores in mouth and left nostril

## 2025-07-22 VITALS
DIASTOLIC BLOOD PRESSURE: 77 MMHG | BODY MASS INDEX: 33 KG/M2 | OXYGEN SATURATION: 100 % | RESPIRATION RATE: 17 BRPM | HEART RATE: 55 BPM | SYSTOLIC BLOOD PRESSURE: 102 MMHG | WEIGHT: 201 LBS | TEMPERATURE: 98 F

## 2025-07-22 LAB
ATRIAL RATE: 65 BPM
P AXIS: 45 DEGREES
P-R INTERVAL: 168 MS
Q-T INTERVAL: 416 MS
QRS DURATION: 80 MS
QTC CALCULATION (BEZET): 432 MS
R AXIS: 41 DEGREES
T AXIS: 55 DEGREES
VENTRICULAR RATE: 65 BPM

## 2025-07-22 NOTE — DISCHARGE INSTRUCTIONS
Please return to the emergency room for any worsening symptoms including but not limited to: Fevers of 100.4 or above, inability to ambulate, worsening headache weakness, numbness, losing stool/urine on yourself, headaches, any changes in your neurological function, etc.  Please follow-up with your primary care provider in the next few days for reevaluation.    The Emergency Department is not intended to be a substitute for an effort to provide complete medical care. The imaging, if any, have often been interpreted on a preliminary basis pending final reading by the radiologist. If your blood pressure was greater than 140/90, please have this blood pressure rechecked by your primary care provider in the next several days.

## 2025-07-22 NOTE — ED PROVIDER NOTES
Patient Seen in: [facility]         EMERGENCY DEPARTMENT NOTE    Dictated. Voice Transcription software has been utilized for this dictation (the reader should be aware that typographical errors are possible with voice transcription software and to please contact the dictating physician if there are questions.)         History     Chief Complaint   Patient presents with    Tingling       There may be discrepancies from triage note.     HPI    History provided by patient.  50-year-old female who reports a history of anxiety, hyperlipidemia complaining of right-sided neck pain for 1 week.  States that she felt as though she slept wrong on her pillow and started having some pain at this site.  A few days ago she started noticing some right finger tingling.  She reports a history of having oral sores and reports noticing a few mouth sores in her mouth.  No other lesions noted to her body.  She reports having a mild headache.  No cough.  Reports immunocompetence.    No fevers, chills, nausea, vomiting, diarrhea, constipation, cough urinary complaints.  No chest pain, shortness of breath  No incontinence.  No changes in mentation, no changes in vision, no total/new extremity weakness, no total/new extremity paresthesia, no difficulty speaking.  No alleviating or exacerbating factors.  Denies orthopnea, pnd, change in exercise tolerance limited by chest pain/sob , lower extremity edema/asymmetry.     Denies fevers, weight loss, urinary/bowel incontinence, weakness, paresthesias, intravenous drug use, recent neck surgeries/manipulation.       History reviewed. Past Medical History[1]    History reviewed. Past Surgical History[2]      Medications :  Prescriptions Prior to Admission[3]     Family History[4]    Smoking Status: Social Hx on file[5]    Review of Systems   Constitutional: Negative.    HENT: Negative.     Eyes: Negative.    Respiratory: Negative.     Cardiovascular: Negative.    Gastrointestinal: Negative.     Genitourinary: Negative.    Musculoskeletal:  Positive for neck pain.   Skin: Negative.    Neurological:  Positive for headaches.   Endo/Heme/Allergies: Negative.    Psychiatric/Behavioral: Negative.     All other systems reviewed and are negative.    Pertinent positives as listed.  All other organ systems are reviewed and are negative.    Constitutional and vital signs reviewed.      Social History and Family History elements reviewed from today, pertinent positives to the presenting problem noted.    Physical Exam     ED Triage Vitals   BP 07/21/25 1757 125/83   Pulse 07/21/25 1757 65   Resp 07/21/25 1757 18   Temp 07/21/25 1757 98 °F (36.7 °C)   Temp src --    SpO2 07/21/25 1757 98 %   O2 Device 07/21/25 1945 None (Room air)       All measures to prevent infection transmission during my interaction with the patient were taken. The patient was already wearing a droplet mask on my arrival to the room. Personal protective equipment including droplet mask, eye protection, and gloves were worn throughout the duration of the exam.  Handwashing was performed prior to and after the exam.  Stethoscope and any equipment used during my examination was cleaned with super sani-cloth germicidal wipes following the exam.     Physical Exam  Vitals and nursing note reviewed.   Constitutional:       General: She is not in acute distress.     Appearance: She is not ill-appearing or toxic-appearing.      Comments: Smiles  Extremely pleasant, talkative   HENT:      Mouth/Throat:      Comments: Gum ulcer noted  Uvula midline;    No tonsillar erythema, edema,   No kissing tonsils  No exudates,   No submandibular asymmetry/fullness.  No peritonsillar abscess  No hoarse voice  No crepitus, no  angioedema    Neck:      Vascular: No carotid bruit.   Cardiovascular:      Rate and Rhythm: Normal rate and regular rhythm.      Comments: Radial pulses 2+ bilaterally    Pulmonary:      Effort: Pulmonary effort is normal. No respiratory  distress.      Breath sounds: No stridor. No wheezing, rhonchi or rales.   Chest:      Chest wall: No tenderness.   Abdominal:      General: There is no distension.      Palpations: Abdomen is soft.      Tenderness: There is no abdominal tenderness. There is no guarding or rebound.      Comments: Negative Ponce sign, negative McBurney's point tenderness     Musculoskeletal:      Cervical back: Normal range of motion and neck supple.      Right lower leg: No edema.      Left lower leg: No edema.   Skin:     Capillary Refill: Capillary refill takes less than 2 seconds.      Coloration: Skin is not jaundiced or pale.      Findings: No bruising, erythema, lesion or rash.   Neurological:      Mental Status: She is alert.      Comments: Cranial nerves 3-12 intact.    5/5 bilateral finger , biceps, triceps, leg extension/flexion, dorsiflexion/plantarflexion.  Sensory function intact symmetrically bilaterally to face, upper extremities and lower extremities to soft touch.  Normal finger-to-nose.  Steady gait  Negative pronator drift       Psychiatric:         Mood and Affect: Mood normal.         Behavior: Behavior normal.           Review of prior notes in Care everywhere/Epic performed by myself:  Pt had a calcium score 2022 with  score 20    Lumbar spine x-ray in 2024 revealing DJD    ED Course     If labs obtained, they are personally reviewed by myself:     Labs Reviewed   BASIC METABOLIC PANEL (8) - Normal   TROPONIN I HIGH SENSITIVITY - Normal   POCT PREGNANCY URINE - Normal   CBC WITH DIFFERENTIAL WITH PLATELET       If radiologic studies ordered during today's ER visit, my independent interpretation are seen directly below.  This is awaiting the radiologist's final interpretation.  CT brain, independent interpretation completed by myself, awaiting  formal radiology read: No obvious intracranial hemorrhage.  CTA brain neck , independent interpretation of radiologic study completed by myself and awaiting formal  radiologist interpretation: No obvious intracranial bleed      Imaging Results read by radiology in ED:      CT head without contrast  CTA head and neck with IV contrast    IMPRESSION:    Non-contrast Head:  No acute intracranial abnormality. No acute territorial infarct, hemorrhage, mass effect, or midline shift.    CTA Neck:  The bilateral vertebral arteries are widely patent. Left vertebral artery is dominant. Bilateral common carotid and internal carotid arteries are widely patent without significant stenosis.    Heterogeneous thyroid gland.    No definite fracture or malalignment.  Mild cervical spondylosis.  May consider the dictated cervical spine CT if there is concern for an acute cervical fracture.    No central canal narrowing.  Possible right neuroforaminal disc bulge with narrowing at C6-7, although this area is limited by streak and limited assessment on CT.  May consider further evaluation with MRI of the cervical spine.    CTA Head:  [place] is patent without aneurysm, significant stenosis, or occlusion.         ED Medications Administered:   Medications   fentaNYL (Sublimaze) 50 mcg/mL injection 25 mcg (25 mcg Intravenous Given 7/21/25 2029)   iopamidol 76% (ISOVUE-370) injection for power injector (80 mL Intravenous Given 7/21/25 2223)           Vitals:    07/21/25 1945 07/21/25 2030 07/21/25 2200 07/22/25 0045   BP: 112/65 107/62 108/82 102/77   Pulse: 65 61 60 55   Resp: 18 20 21 17   Temp:       SpO2: 97% 98% 100% 100%   Weight:         *I personally reviewed and interpreted all ED vitals.    Pulse Ox interpretation by myself: 100%, Room air, Normal     Monitor Interpretation by myself:   normal sinus rhythm    If Ekg obtained during today's visit, it is independently interpreted by myself directly below:  EKG    Rate, intervals and axes as noted on EKG Report.  Rate: 65  Rhythm: Sinus Rhythm  Reading: no st elevation, no st depression, normal t waves   Sinus arrhythmia              Medical Record Review: I personally reviewed available prior medical records for any recent pertinent discharge summaries, testing, and procedures and reviewed those reports.      [facility]     Medical decision making/ED Course:   50-year-old female complaining of right-sided neck pain and tingling to her fingertips.  Neurologically vascularly intact and extremely well-appearing.  Her symptoms seem most consistent with a probable non-life-threatening musculoskeletal process versus cervical radiculopathy versus other.  Equal radial pulses.  Thankfully CT brain/CTA brain and neck negative for acute pathology.  I personally spoke to reading radiologist, possible disc bulge.  Patient denies continence-cord compression considered and unlikely.    Well-appearing, no distress.  Labs today appear stable.  CBC/BMP normal.  Urine pregnancy test negative.  Troponin negative, EKG nonischemic.  ACS considered and unlikely.  Supportive care instructions provided.    Cord compression, dissection, atypical ACS, epidural abscess, CVA, among other life-threatening medical conditions considered and seems unlikely given patient's history, exam, and appearance.  Strict return instructions given.  Patient encouraged to follow-up with primary care provider in the next few days.  Advised to return to the emergency department for any worsening symptoms    Patient is non toxic appearing, is in no distress, hemodynamically stable.  Pt agrees and is aware of plan.         Differential Diagnosis:  as listed above in medical decision making.   *Please note that in the presenting to the emergency department, illness/injury that poses a threat to life or function is considered during this patient's initial evaluation.    The complexity of this visit is therefore inherently more complex given the need to consider life threatening pathology prior to any other etiology for this patient's visit.    The differential diagnosis and medical decision above  exemplify this rationale.       Medical Decision Making  Problems Addressed:  Neck pain: acute illness or injury  Numbness and tingling: acute illness or injury    Amount and/or Complexity of Data Reviewed  External Data Reviewed: labs, radiology and notes.  Labs: ordered. Decision-making details documented in ED Course.  Radiology: ordered and independent interpretation performed. Decision-making details documented in ED Course.  ECG/medicine tests: ordered and independent interpretation performed. Decision-making details documented in ED Course.    Risk  OTC drugs.          ED Course as of 07/22/25 0251  ------------------------------------------------------------  Time: 07/22 0016  Comment: Personally spoke with reading radiologist , no obvious  foraminal stenosis , ? R        Vitals:    07/21/25 1945 07/21/25 2030 07/21/25 2200 07/22/25 0045   BP: 112/65 107/62 108/82 102/77   Pulse: 65 61 60 55   Resp: 18 20 21 17   Temp:       SpO2: 97% 98% 100% 100%   Weight:                 Complicating Factors: Significant medical problems that contribute to the complexity of this emergency room evaluation is listed above.    Condition upon leaving the department: Stable    Disposition and Plan     Clinical Impression:  1. Neck pain    2. Numbness and tingling        Disposition:  Discharge    Medications Prescribed:  Discharge Medication List as of 7/22/2025 12:44 AM          I have discussed the discharge plan with the patient and/or family or well wisher present in the room with the patient's permission.  They state that they understand and agree with the plan.  All questions regarding their care have been answered prior to discharge.  They are aware: Emergency Department is not intended to be a substitute for an effort to provide complete medical care. The imaging, if any, have often been interpreted on a preliminary basis pending final reading by the radiologist.  Instructed to return immediately to the ED if any  changes or worsening of condition should occur.  If patient's blood pressure was greater than 140/90 today, patient encouraged to have this blood pressure rechecked with primary MD and blood pressure education provided.                     [1]   Past Medical History:   Anemia    Anxiety    Colon polyp    None in . REpeat CLN in .    Herpes simplex type 1 infection    Hyperlipidemia    Menorrhagia    Morbid obesity with BMI of 40.0-44.9, adult (HCC)    Obesity   [2]   Past Surgical History:  Procedure Laterality Date    Colonoscopy N/A 2019    Procedure: COLONOSCOPY;  Surgeon: POLLO Holland MD;  Location: [facility] ENDOSCOPY    D & c      x 2          Salpingectomy Bilateral 2017    Tubal ligation     [3] (Not in a hospital admission)   [4]   Family History  Problem Relation Age of Onset    Diabetes Mother     Hypertension Mother     Diabetes Sister     Diabetes Father     Other (Other) Father    [5]   Social History  Socioeconomic History    Marital status: Single   Tobacco Use    Smoking status: Never    Smokeless tobacco: Never   Vaping Use    Vaping status: Never Used   Substance and Sexual Activity    Alcohol use: Yes     Alcohol/week: 3.0 standard drinks of alcohol     Types: 3 Shots of liquor per week     Comment: rarely    Drug use: No    Sexual activity: Not Currently     Partners: Male   Other Topics Concern    Blood Transfusions Yes

## 2025-08-07 ENCOUNTER — OFFICE VISIT (OUTPATIENT)
Dept: PODIATRY CLINIC | Facility: CLINIC | Age: 50
End: 2025-08-07

## 2025-08-07 DIAGNOSIS — G57.62 PLANTAR NEUROMA OF LEFT FOOT: Primary | ICD-10-CM

## 2025-08-07 PROCEDURE — 99213 OFFICE O/P EST LOW 20 MIN: CPT | Performed by: STUDENT IN AN ORGANIZED HEALTH CARE EDUCATION/TRAINING PROGRAM

## 2025-08-08 ENCOUNTER — HOSPITAL ENCOUNTER (OUTPATIENT)
Dept: ULTRASOUND IMAGING | Age: 50
Discharge: HOME | End: 2025-08-08
Attending: FAMILY MEDICINE

## 2025-08-08 DIAGNOSIS — Z13.9 SCREENING FOR CONDITION: ICD-10-CM

## (undated) DIAGNOSIS — R10.9 ABDOMINAL DISCOMFORT: ICD-10-CM

## (undated) DIAGNOSIS — K59.00 CONSTIPATION, UNSPECIFIED CONSTIPATION TYPE: Primary | ICD-10-CM

## (undated) DIAGNOSIS — Z13.6 SCREENING FOR HEART DISEASE: Primary | ICD-10-CM

## (undated) DIAGNOSIS — K62.5 BRIGHT RED BLOOD PER RECTUM: ICD-10-CM

## (undated) DIAGNOSIS — R14.0 BLOATING: ICD-10-CM

## (undated) DIAGNOSIS — R11.0 NAUSEA: ICD-10-CM

## (undated) DEVICE — MASK PROC W/VISOR ANTIGLARE

## (undated) DEVICE — SOLUTION  .9 3000ML

## (undated) DEVICE — Device: Brand: DEFENDO AIR/WATER/SUCTION AND BIOPSY VALVE

## (undated) DEVICE — HYSTEROSCOPY: Brand: MEDLINE INDUSTRIES, INC.

## (undated) DEVICE — DEVICE SPEC RTRVL MYOSURE

## (undated) DEVICE — 35 ML SYRINGE REGULAR TIP: Brand: MONOJECT

## (undated) DEVICE — MYOSURE SINGLE USE SEAL SET

## (undated) DEVICE — SUCTION CANISTER, 3000CC,SAFELINER: Brand: DEROYAL

## (undated) DEVICE — Device: Brand: CUSTOM PROCEDURE KIT

## (undated) DEVICE — LINE MNTR ADLT SET O2 INTMD

## (undated) DEVICE — MEDI-VAC NON-CONDUCTIVE SUCTION TUBING 6MM X 1.8M (6FT.) L: Brand: CARDINAL HEALTH

## (undated) DEVICE — SOCK CNSTR 4IN TNPSL UNV SPEC

## (undated) DEVICE — SET TUBI Y FL CNTRL INFL/OTFL

## (undated) DEVICE — FORCEP RADIAL JAW 4

## (undated) DEVICE — SNARE CAPTIFLEX MICRO-OVL OLY

## (undated) DEVICE — SNARE OPTMZ PLPCTM TRP

## (undated) NOTE — Clinical Note
Date: 2025      Patient Name: Suzie Ferris      : 1975        Thank you for choosing [facility] as your health care provider. Your physician has deemed the following medical service(s) necessary. However, your insurance plan may not pay for all of your health care and costs and may deny payment for this service. The fact that your insurance plan does not pay for an item or service does not mean you should not receive it. The purpose of this form is to help you make an informed decision about whether or not you want to receive this service(s) that may not be paid for by your insurance plan.    CPT Code Description     Cost     _________ ______________________________  _____________      _________ ______________________________ _____________      _________ ______________________________ _____________      I understand that the above mentioned service(s) or supply may not be covered by my insurance company. I agree to be financially responsible for the cost of this service or supply in the event of my insurance denies payment as a non-covered benefit.        ______________________________________________________________________  Signature of Patient or Patient's Representative  Relationship  Date    ______________________________________________________________________  Signature of Witness to signing of form   Printed Name

## (undated) NOTE — MR AVS SNAPSHOT
Joselo Weems [address] [facility] [address] [id number]  [phone]  [phone]               Thank you for choosing us for your health care visit with Devon Anderson PTA.   We are glad to serve you and happy to provide you with Please arrive at your scheduled appointment time. Wear comfortable, loose fitting clothing.             Apr 11, 2017  1:30 PM   [facility] Visit By Therapist with Efren Smith, [address] [place] [address]

## (undated) NOTE — ED AVS SNAPSHOT
[facility] Emergency Department    [place] [address]     [address] [place] [address]    Phone:  [phone]    Fax:  [phone]   MRN: [id number]    Department:  [facility] Emergency Department   Date of Visit:  1/1/ and Class Registration line at [phone] or find a doctor online by visiting [email or web address].    IF THERE IS ANY CHANGE OR WORSENING OF YOUR CONDITION, CALL YOUR PRIMARY CARE PHYSICIAN AT ONCE OR RETURN IMMEDIATELY TO [address] [place] [address].     If

## (undated) NOTE — LETTER
AUTHORIZATION FOR SURGICAL OPERATION OR OTHER PROCEDURE    1. I hereby authorize Dr. Valentin Cantu and the [facility] staff assigned to my case to perform the following operation and/or procedure at the [facility]:    Right knee injection x3 2/3 with synvisc       2.  My physician has explained the nature and purpose of the operation or other procedure, possible alternative methods of treatment, the risks involved, and the possibility of complication to me.  I acknowledge that no guarantee has been made as to the result that may be obtained.  3.  I recognize that, during the course of this operation, or other procedure, unforseen conditions may necessitate additional or different procedure than those listed above.  I, therefore, further authorize and request that the above named physician, his/her physician assistants or designees perform such procedures as are, in his/her professional opinion, necessary and desirable.  4.  Any tissue or organs removed in the operation or other procedure may be disposed of by and at the discretion of the [facility] staff and [facility].  5.  I understand that in the event of a medical emergency, I will be transported by local paramedics to [facility] or other hospital emergency department.  6.  I certify that I have read and fully understand the above consent to operation and/or other procedure.    7.  I acknowledge that my physician has explained sedation/analgesia administration to me including the risks and benefits.  I consent to the administration of sedation/analgesia as may be necessary or desirable in the judgement of my physician.    Witness signature: ___________________________________________________ Date:  ______/______/_____                    Time:  ________ A.M.  P.M.       Suzie Peoples  5/23/1975  [id number]         Patient signature:  ___________________________________________________      Statement of Physician  My signature  below affirms that prior to the time of the procedure, I have explained to the patient and/or his/her guardian, the risks and benefits involved in the proposed treatment and any reasonable alternative to the proposed treatment.  I have also explained the risks and benefits involved in the refusal of the proposed treatment and have answered the patient's questions.                        Date:  ______/______/_______  Provider                      Signature:  __________________________________________________________       Time:  ___________ A.M    P.M.

## (undated) NOTE — LETTER
LUCIUS Notifier: [employer]. Patient Name: Suzie Ferris Identification Number: [id number]                          Advance Beneficiary Notice of Noncoverage (ABN)   NOTE:  If Medicare doesn’t pay for D. item/service(s) below, you may have to pay.  Medicare does not pay for everything, even some care that you or your health care provider have good reason to think you need. We expect Medicare may not pay for the D. item/service(s) below.  D. Items or Services  Right knee injection x3 with synvisc  E. Reason Medicare May Not Pay: F. Estimated Cost   __ EKG ($87.00)  __ Pap smear ($101) __Pelvic/Breast ($147.00)  __ Ear Irrigation ($138)  _X_ Injection(s)  ___ Tdap ($181)       ___ Meningitis ($290)   __Prevnar ($555)  ___ Td ($66)              ___ Prevnar 20 ($549)  ___ Hep A ($152)     ___ Prolia ($1827)         __ Xiaflex ($            )   ___ Hep B ($150)     ___ Pneumovax ($287)                                         ___ Vaccine Administration ($65)   __ Medicare does not cover this service      __ Medicare may not pay for this   item/service for your condition     __ Medicare may not pay for this item/service as often as this        WHAT YOU NEED TO DO NOW:  Read this notice, so you can make an informed decision about your care.  Ask us any questions that you may have after you finish reading.  Choose an option below about whether to receive the D. item/service(s) listed above.  Note: If you choose Option 1 or 2, we may help you to use any other insurance that you might have, but Medicare cannot require us to do this.  G. OPTIONS: Check only one box.  We cannot choose a box for you.   OPTION 1. I want the D. item/service(s) listed above. You may ask to be paid now, but I also want Medicare billed for an official decision on payment, which is sent to me on a Medicare Summary Notice (MSN). I understand that if Medicare doesn’t pay, I am responsible for payment, but I can appeal to Medicare by  following the directions on the MSN. If Medicare does pay, you will refund any payments I made to you, less co-pays or deductibles.  OPTION 2. I want the D. item/service(s) listed above, but do not bill Medicare. You may ask to be paid now as I am responsible for payment. I cannot appeal if Medicare is not billed.  OPTION 3. I don't want the D. item/service(s) listed above. I understand with this choice I am not responsible for payment, and I cannot appeal to see if Medicare would pay.    H. Additional Information:    This notice gives our opinion, not an official Medicare decision. If you have other questions on this notice or Medicare billing, call 1-800-MEDICARE (1-818.117.6420/TTY: [phone]). Signing below means that you have received and understand this notice. You also receive a copy.  I. Signature: J. Date:       You have the right to get Medicare information in an accessible format, like large print, Braille, or audio. You also have the right to file a complaint if you feel you’ve been discriminated against. Visit Medicare.gov/about- us/xwjomahepidew-jfzuilksmligjxjfv-jacayl.  According to the Paperwork Reduction Act of 1995, no persons are required to respond to a collection of information unless it displays a valid OMB control number. The valid OMB control number for this information collection is 0962-5493. The time required to complete this information collection is estimated to average 7 minutes per response, including the time to review instructions, search existing data resources, gather the data needed, and complete and review the information collection. If you have comments concerning the accuracy of the time estimate or suggestions for improving this form, please write to: CMS, [facility] [employer]     Eric Attn: [employer] Clearance Officer, [place], [place] [address].  Form CMS-R-131 (Exp. 1/31/2026) Form Approved OMB No. [id number]

## (undated) NOTE — LETTER
Date: 2025      Patient Name: Suzie Ferris      : 1975        Thank you for choosing [facility] as your health care provider. Your physician has deemed the following medical service(s) necessary. However, your insurance plan may not pay for all of your health care and costs and may deny payment for this service. The fact that your insurance plan does not pay for an item or service does not mean you should not receive it. The purpose of this form is to help you make an informed decision about whether or not you want to receive this service(s) that may not be paid for by your insurance plan.    CPT Code Description     Cost     Right knee injection x3 with synvisc 2/3         I understand that the above mentioned service(s) or supply may not be covered by my insurance company. I agree to be financially responsible for the cost of this service or supply in the event of my insurance denies payment as a non-covered benefit.        ______________________________________________________________________  Signature of Patient or Patient's Representative  Relationship  Date    ______________________________________________________________________  Signature of Witness to signing of form   Printed Name

## (undated) NOTE — MR AVS SNAPSHOT
Joselo Weems [address] [facility] [address] [address]  [phone]  [phone]               Thank you for choosing us for your health care visit with Ridge Anthony PTA.   We are glad to serve you and happy to provide you with discharge instructions in [employer] by going to Visits < Admission Summaries. If you've been to the Emergency Department or your doctor's office, you can view your past visit information in [employer] by going to Visits < Visit Summaries. [employer] questions?

## (undated) NOTE — LETTER
AUTHORIZATION FOR SURGICAL OPERATION OR OTHER PROCEDURE    1. I hereby authorize Dr. Resendez, and [facility] staff assigned to my case to perform the following operation and/or procedure at the [facility] site:    _________________________left foot cortisone injection  ______________________________________________________________________      _______________________________________________________________________________________________    2.  My physician has explained the nature and purpose of the operation or other procedure, possible alternative methods of treatment, the risks involved, and the possibility of complication to me.  I acknowledge that no guarantee has been made as to the result that may be obtained.  3.  I recognize that, during the course of this operation, or other procedure, unforseen conditions may necessitate additional or different procedure than those listed above.  I, therefore, further authorize and request that the above named physician, his/her physician assistants or designees perform such procedures as are, in his/her professional opinion, necessary and desirable.  4.  Any tissue or organs removed in the operation or other procedure may be disposed of by and at the discretion of the [facility] and [facility].  5.  I understand that in the event of a medical emergency, I will be transported by local paramedics to [facility] or other hospital emergency department.  6.  I certify that I have read and fully understand the above consent to operation and/or other procedure.    7.  I acknowledge that my physician has explained sedation/analgesia administration to me including the risks and benefits.  I consent to the administration of sedation/analgesia as may be necessary or desirable in the judgement of my physician.    Witness signature: ___________________________________________________ Date:   ______/______/_____                    Time:  ________ A.M.  P.M.       Patient Name:  ______________________________________________________  (please print)      Patient signature:  ___________________________________________________             Relationship to Patient:           []  Parent    Responsible person                          []  Spouse  In case of minor or                    [] Other  _____________   Incompetent name:  __________________________________________________                               (please print)      _____________      Responsible person  In case of minor or  Incompetent signature:  _______________________________________________    Statement of Physician  My signature below affirms that prior to the time of the procedure, I have explained to the patient and/or his/her guardian, the risks and benefits involved in the proposed treatment and any reasonable alternative to the proposed treatment.  I have also explained the risks and benefits involved in the refusal of the proposed treatment and have answered the patient's questions.                        Date:  ______/______/_______  Provider                      Signature:  __________________________________________________________       Time:  ___________ A.M    P.M.

## (undated) NOTE — LETTER
AUTHORIZATION FOR SURGICAL OPERATION OR OTHER PROCEDURE    1. I hereby authorize Dr. Valentin Cantu and the [facility] staff assigned to my case to perform the following operation and/or procedure at the [facility] Office:  Right knee injection with synvisc     2.  My physician has explained the nature and purpose of the operation or other procedure, possible alternative methods of treatment, the risks involved, and the possibility of complication to me.  I acknowledge that no guarantee has been made as to the result that may be obtained.  3.  I recognize that, during the course of this operation, or other procedure, unforseen conditions may necessitate additional or different procedure than those listed above.  I, therefore, further authorize and request that the above named physician, his/her physician assistants or designees perform such procedures as are, in his/her professional opinion, necessary and desirable.  4.  Any tissue or organs removed in the operation or other procedure may be disposed of by and at the discretion of the [facility] staff and [facility].  5.  I understand that in the event of a medical emergency, I will be transported by local paramedics to [facility] or other hospital emergency department.  6.  I certify that I have read and fully understand the above consent to operation and/or other procedure.    7.  I acknowledge that my physician has explained sedation/analgesia administration to me including the risks and benefits.  I consent to the administration of sedation/analgesia as may be necessary or desirable in the judgement of my physician.    Witness signature: ___________________________________________________ Date:  ______/______/_____                    Time:  ________ A.M.  P.M.       Patient Name:  Suzie Ferris  5/23/1975  [id number]         Patient signature:  ___________________________________________________    Statement of Physician  My  signature below affirms that prior to the time of the procedure, I have explained to the patient and/or his/her guardian, the risks and benefits involved in the proposed treatment and any reasonable alternative to the proposed treatment.  I have also explained the risks and benefits involved in the refusal of the proposed treatment and have answered the patient's questions.                        Date:  ______/______/_______  Provider                      Signature:  __________________________________________________________       Time:  ___________ A.M    P.M.

## (undated) NOTE — LETTER
MINOR CASE LETTER      8/17/2022        Dear Jody Park are having a Hysteroscopy with Dilation and Curettage on Wednesday, 08/24/2022 at 11:45am. You are to arrive 2 hours prior, which would be 09:45am.     Surgery will be done at [facility] will park in the blue parking lot, enter the main entrance, and go to the second floor. Do not eat or drink anything (including water) after midnight the night before surgery. If your procedure is scheduled later in the day, then nothing by mouth for 6 hours before arrival to the hospital.    Kelsey Guallpa are to call this office if you have any cold or flu symptoms 2 days before your scheduled surgery. Please avoid aspirin 7 days before surgery. Avoid Ibuprofen, Motrin, Aleve, or Naprosyn for 3 days before surgery. You will be contacted by PreAdmission Testing (PAT) usually within the week before surgery. They will take a short medical history and let you know if any preoperative testing is needed. Please make arrangements for someone to drive you home after your surgery. Please feel free to contact our office at  if you have any questions regarding these instructions or your procedure.       Sincerely,          Genny Pope MD, MD [place] [address]   [address] [place] [address]  [phone]

## (undated) NOTE — MR AVS SNAPSHOT
Joselo Weems [address] [facility] [address] [address]  [phone]  [phone]               Thank you for choosing us for your health care visit with Cynthia Gutierres PTA.   We are glad to serve you and happy to provide you with

## (undated) NOTE — LETTER
AUTHORIZATION FOR SURGICAL OPERATION OR OTHER PROCEDURE    1. I hereby authorize Dr. Armin Mart, and [facility] staff assigned to my case to perform the following operation and/or procedure [facility]:    _______________Left foot cortisone injection______________________________      _______________________________________________________________________________________________    2. My physician has explained the nature and purpose of the operation or other procedure, possible alternative methods of treatment, the risks involved, and the possibility of complication to me. I acknowledge that no guarantee has been made as to the result that may be obtained. 3.  I recognize that, during the course of this operation, or other procedure, unforseen conditions may necessitate additional or different procedure than those listed above. I, therefore, further authorize and request that the above named physician, his/her physician assistants or designees perform such procedures as are, in his/her professional opinion, necessary and desirable. 4.  Any tissue or organs removed in the operation or other procedure may be disposed of by and at the discretion of the [facility] and [facility] 5.  I understand that in the event of a medical emergency, I will be transported by local paramedics to [facility] or other hospital emergency department. 6.  I certify that I have read and fully understand the above consent to operation and/or other procedure. 7.  I acknowledge that my physician has explained sedation/analgesia administration to me including the risks and benefits. I consent to the administration of sedation/analgesia as may be necessary or desirable in the judgement of my physician. Witness signature: ___________________________________________________ Date:  ______/______/_____                    Time:  ________ A. M.  P.M.        Patient Name: ______________________________________________________  (please print)      Patient signature:  ___________________________________________________             Relationship to Patient:           []  Parent    Responsible person                          []  Spouse  In case of minor or                    [] Other  _____________   Incompetent name:  __________________________________________________                               (please print)      _____________      Responsible person  In case of minor or  Incompetent signature:  _______________________________________________    Statement of Physician  My signature below affirms that prior to the time of the procedure, I have explained to the patient and/or his/her guardian, the risks and benefits involved in the proposed treatment and any reasonable alternative to the proposed treatment. I have also explained the risks and benefits involved in the refusal of the proposed treatment and have answered the patient's questions.                         Date:  ______/______/_______  Provider                      Signature:  __________________________________________________________       Time:  ___________ A.M    P.M.

## (undated) NOTE — ED AVS SNAPSHOT
Kevin Mitchell   MRN: [id number]    Department:  [facility] Emergency Department   Date of Visit:  12/5/2017           Disclosure     Insurance plans vary and the physician(s) referred by the ER may not be covered by your plan.  Please contact CARE PHYSICIAN AT ONCE OR RETURN IMMEDIATELY TO THE EMERGENCY DEPARTMENT. If you have been prescribed any medication(s), please fill your prescription right away and begin taking the medication(s) as directed.   If you believe that any of the medications

## (undated) NOTE — LETTER
AUTHORIZATION FOR SURGICAL OPERATION OR OTHER PROCEDURE    1. I hereby authorize Dr. Emmie Hudson, and [address] [place] [address] staff assigned to my case to perform the following operation and/or procedure at the [address] [place] [address]:    _______________________________________________________________________________________________    Cortisone injection Left foot   _______________________________________________________________________________________________    2. My physician has explained the nature and purpose of the operation or other procedure, possible alternative methods of treatment, the risks involved, and the possibility of complication to me. I acknowledge that no guarantee has been made as to the result that may be obtained. 3.  I recognize that, during the course of this operation, or other procedure, unforseen conditions may necessitate additional or different procedure than those listed above. I, therefore, further authorize and request that the above named physician, his/her physician assistants or designees perform such procedures as are, in his/her professional opinion, necessary and desirable. 4.  Any tissue or organs removed in the operation or other procedure may be disposed of by and at the discretion of the [address] [place] [address] and [facility]. 5.  I understand that in the event of a medical emergency, I will be transported by local paramedics to [facility] or other hospital emergency department. 6.  I certify that I have read and fully understand the above consent to operation and/or other procedure. 7.  I acknowledge that my physician has explained sedation/analgesia administration to me including the risks and benefits. I consent to the administration of sedation/analgesia as may be necessary or desirable in the judgement of my physician.     Witness signature: ___________________________________________________ Date:  ______/______/_____                    Time: ________ A. M.  P.M. Patient Name:  ______________________________________________________  (please print)      Patient signature:  ___________________________________________________             Relationship to Patient:           []  Parent    Responsible person                          []  Spouse  In case of minor or                    [] Other  _____________   Incompetent name:  __________________________________________________                               (please print)      _____________      Responsible person  In case of minor or  Incompetent signature:  _______________________________________________    Statement of Physician  My signature below affirms that prior to the time of the procedure, I have explained to the patient and/or his/her guardian, the risks and benefits involved in the proposed treatment and any reasonable alternative to the proposed treatment. I have also explained the risks and benefits involved in the refusal of the proposed treatment and have answered the patient's questions.                         Date:  ______/______/_______  Provider                      Signature:  __________________________________________________________       Time:  ___________ A.M    P.M.

## (undated) NOTE — LETTER
[facility] [address], [facility]   Date:   7/6/2023     Name:   Amrinda Miranda    YOB: 1975   MRN:   [id number]       [facility]? Filippo the areas on your body where you feel the described sensations. Use the appropriate symbol. Evelyn Oneill the areas of radiation. Include all affected areas. Just to complete the picture, please draw in the face. ACHE:  ^ ^ ^   NUMBNESS:  0000   PINS & NEEDLES:  = = = =                              ^ ^ ^                       0000              = = = =                                    ^ ^ ^                       0000            = = = =      BURNING:  XXXX   STABBING: ////                  XXXX                ////                         XXXX          ////     Please filippo the line below indicating your degree of pain right now  with 0 being no pain 10 being the worst pain possible.                                          0             1             2              3             4              5              6              7             8             9             10         Patient Signature:

## (undated) NOTE — MR AVS SNAPSHOT
Joselo Weems [address]  [phone]  [phone]               Thank you for choosing us for your health care visit with Rogelio Encarnacion PT.   We are glad to serve you and happy to provide you with discharge instructions in [employer] by going to Visits < Admission Summaries. If you've been to the Emergency Department or your doctor's office, you can view your past visit information in [employer] by going to Visits < Visit Summaries. [employer] questions?

## (undated) NOTE — MR AVS SNAPSHOT
After Visit Summary   4/26/2021    Jackie Washington    MRN: [id number]           Visit Information     Date & Time  4/26/2021 10:30 AM Provider  Jeffrey Crespo, [facility], [facility] Dept.  Phone  [phone] 4/26/2022    THINPREP PAP SMEAR B [[id number] CUSTOM]  4/26/2021 4/26/2022      Future Appointments        Provider Department    5/28/2021 10:50 AM LIANNE Garza/ Ana Laraos [address] [facility], [facility] now offers V available by appointment     Average cost  $70*       [address]   Monday – Friday  10:00 am – 10:00 pm   Saturday – Sunday  10:00 am – 4:00 pm     [employer]   Monday – Frid

## (undated) NOTE — LETTER
AUTHORIZATION FOR SURGICAL OPERATION OR OTHER PROCEDURE    1. I hereby authorize Kayy Belle, and [facility] staff assigned to my case to perform the following operation and/or procedure at the [facility] site:    _______________________________________________________________________________________________     left foot neuroma cortisone injection  _______________________________________________________________________________________________    2.  My physician has explained the nature and purpose of the operation or other procedure, possible alternative methods of treatment, the risks involved, and the possibility of complication to me.  I acknowledge that no guarantee has been made as to the result that may be obtained.  3.  I recognize that, during the course of this operation, or other procedure, unforseen conditions may necessitate additional or different procedure than those listed above.  I, therefore, further authorize and request that the above named physician, his/her physician assistants or designees perform such procedures as are, in his/her professional opinion, necessary and desirable.  4.  Any tissue or organs removed in the operation or other procedure may be disposed of by and at the discretion of the [facility] and [facility].  5.  I understand that in the event of a medical emergency, I will be transported by local paramedics to [facility] or other hospital emergency department.  6.  I certify that I have read and fully understand the above consent to operation and/or other procedure.    7.  I acknowledge that my physician has explained sedation/analgesia administration to me including the risks and benefits.  I consent to the administration of sedation/analgesia as may be necessary or desirable in the judgement of my physician.    Witness signature: ___________________________________________________ Date:   ______/______/_____                    Time:  ________ A.M.  P.M.       Patient Name:  ______________________________________________________  (please print)      Patient signature:  ___________________________________________________             Relationship to Patient:           []  Parent    Responsible person                          []  Spouse  In case of minor or                    [] Other  _____________   Incompetent name:  __________________________________________________                               (please print)      _____________      Responsible person  In case of minor or  Incompetent signature:  _______________________________________________    Statement of Physician  My signature below affirms that prior to the time of the procedure, I have explained to the patient and/or his/her guardian, the risks and benefits involved in the proposed treatment and any reasonable alternative to the proposed treatment.  I have also explained the risks and benefits involved in the refusal of the proposed treatment and have answered the patient's questions.                        Date:  ______/______/_______  Provider                      Signature:  __________________________________________________________       Time:  ___________ A.M    P.M.

## (undated) NOTE — LETTER
AUTHORIZATION FOR SURGICAL OPERATION OR OTHER PROCEDURE    1. I hereby authorize Dr. Mendoza Aguilera, and [address] [place] [address] staff assigned to my case to perform the following operation and/or procedure at the [address] [place] [address]:    _______________________________________________________________________________________________    Left foot cortisone injection  _______________________________________________________________________________________________    2. My physician has explained the nature and purpose of the operation or other procedure, possible alternative methods of treatment, the risks involved, and the possibility of complication to me. I acknowledge that no guarantee has been made as to the result that may be obtained. 3.  I recognize that, during the course of this operation, or other procedure, unforseen conditions may necessitate additional or different procedure than those listed above. I, therefore, further authorize and request that the above named physician, his/her physician assistants or designees perform such procedures as are, in his/her professional opinion, necessary and desirable. 4.  Any tissue or organs removed in the operation or other procedure may be disposed of by and at the discretion of the [address] [place] [address] and [facility]. 5.  I understand that in the event of a medical emergency, I will be transported by local paramedics to [facility] or other hospital emergency department. 6.  I certify that I have read and fully understand the above consent to operation and/or other procedure. 7.  I acknowledge that my physician has explained sedation/analgesia administration to me including the risks and benefits. I consent to the administration of sedation/analgesia as may be necessary or desirable in the judgement of my physician.     Witness signature: ___________________________________________________ Date:  ______/______/_____                    Time: ________ A. M.  P.M. Patient Name:  ______________________________________________________  (please print)      Patient signature:  ___________________________________________________             Relationship to Patient:           []  Parent    Responsible person                          []  Spouse  In case of minor or                    [] Other  _____________   Incompetent name:  __________________________________________________                               (please print)      _____________      Responsible person  In case of minor or  Incompetent signature:  _______________________________________________    Statement of Physician  My signature below affirms that prior to the time of the procedure, I have explained to the patient and/or his/her guardian, the risks and benefits involved in the proposed treatment and any reasonable alternative to the proposed treatment. I have also explained the risks and benefits involved in the refusal of the proposed treatment and have answered the patient's questions.                         Date:  ______/______/_______  Provider                      Signature:  __________________________________________________________       Time:  ___________ A.M    P.M.

## (undated) NOTE — MR AVS SNAPSHOT
Joselo Weems [address] [facility] [address] [address]  [phone]  [phone]               Thank you for choosing us for your health care visit with Betsy Noriega PTA.   We are glad to serve you and happy to provide you with

## (undated) NOTE — ED AVS SNAPSHOT
Sana Thompson   MRN: [id number]    Department:  [facility] Emergency Department   Date of Visit:  1/3/2019           Disclosure     Insurance plans vary and the physician(s) referred by the ER may not be covered by your plan.  Please contact y within the next three months to obtain basic health screening including reassessment of your blood pressure.     IF THERE IS ANY CHANGE OR WORSENING OF YOUR CONDITION, CALL YOUR PRIMARY CARE PHYSICIAN AT ONCE OR RETURN IMMEDIATELY TO THE EMERGENCY DEPARTMEN

## (undated) NOTE — ED AVS SNAPSHOT
[facility] Emergency Department    [place] [address]     [address] [place] [address]    Phone:  [phone]    Fax:  [phone]   MRN: [id number]    Department:  [facility] Emergency Department   Date of Visit:  1/1/ covered by your plan. Please contact your insurance company to determine coverage and benefits available for follow-up care and referrals.       If you have difficulty scheduling your follow-up appointment as directed, please call our  at ([phone]) If you believe that any of the medications or instructions on this list is different from what your Primary Care doctor has instructed you - please continue to take your medications as instructed by your Primary Care doctor until you can check with your do Medicaid plans. To get signed up and covered, please call [phone] and ask to get set up for an insurance coverage that is in-network with [facility]     Sign up for [employer], your secure online medical record.   [employer]

## (undated) NOTE — LETTER
AUTHORIZATION FOR SURGICAL OPERATION OR OTHER PROCEDURE    1. I hereby authorize Dr. Valentin Cantu and the [facility] staff assigned to my case to perform the following operation and/or procedure at the [facility]:    Right knee injection x3 with synvisc     2.  My physician has explained the nature and purpose of the operation or other procedure, possible alternative methods of treatment, the risks involved, and the possibility of complication to me.  I acknowledge that no guarantee has been made as to the result that may be obtained.  3.  I recognize that, during the course of this operation, or other procedure, unforseen conditions may necessitate additional or different procedure than those listed above.  I, therefore, further authorize and request that the above named physician, his/her physician assistants or designees perform such procedures as are, in his/her professional opinion, necessary and desirable.  4.  Any tissue or organs removed in the operation or other procedure may be disposed of by and at the discretion of the [facility] staff and [facility].  5.  I understand that in the event of a medical emergency, I will be transported by local paramedics to [facility] or other hospital emergency department.  6.  I certify that I have read and fully understand the above consent to operation and/or other procedure.    7.  I acknowledge that my physician has explained sedation/analgesia administration to me including the risks and benefits.  I consent to the administration of sedation/analgesia as may be necessary or desirable in the judgement of my physician.    Witness signature: ___________________________________________________ Date:  ______/______/_____                    Time:  ________ A.M.  P.M.       Patient Name:    Suzie Ferris  5/23/1975  [id number]       Patient signature:  ___________________________________________________                   Statement of  Physician  My signature below affirms that prior to the time of the procedure, I have explained to the patient and/or his/her guardian, the risks and benefits involved in the proposed treatment and any reasonable alternative to the proposed treatment.  I have also explained the risks and benefits involved in the refusal of the proposed treatment and have answered the patient's questions.                        Date:  ______/______/_______  Provider                      Signature:  __________________________________________________________       Time:  ___________ A.M    P.M.

## (undated) NOTE — LETTER
WHERE IS YOUR PAIN NOW?  Filippo the areas on your body where you feel the described sensations.  Use the appropriate symbol.  Filippo the areas of radiation.  Include all affected areas.  Just to complete the picture, please draw in the face.     ACHE:  ^ ^ ^   NUMBNESS:  0000   PINS & NEEDLES:  = = = =                              ^ ^ ^                       0000              = = = =                                    ^ ^ ^                       0000            = = = =      BURNING:  XXXX   STABBING: ////                  XXXX                ////                         XXXX          ////     Please filippo the line below indicating your degree of pain right now  with 0 being no pain 10 being the worst pain possible.                                         0             1             2              3             4              5              6              7             8             9             10         Patient Signature:

## (undated) NOTE — MR AVS SNAPSHOT
[address]  [address]  [phone]               Thank you for choosing us for your health care visit with Marcelina Miranda DPM.  We are glad to serve you and happy to provide yo Take 1 tablet (20 mg total) by mouth 2 (two) times daily.    Commonly known as:  PEPCID           Ferrous Sulfate 325 (65 Fe) MG Tabs   TK 1 T PO DAILY           * ibuprofen 800 MG Tabs   Take 1 tablet (800 mg total) by mouth every 6 (six) hours as needed f

## (undated) NOTE — MR AVS SNAPSHOT
Jos[address] Weems [address] [facility] [address] [address]  [phone]  [phone]               Thank you for choosing us for your health care visit with Clary Blankenship PTA.   We are glad to serve you and happy to provide you with Please arrive at your scheduled appointment time. Wear comfortable, loose fitting clothing.             Apr 13, 2017  1:30 PM   [facility] Visit By Therapist with Lizbeth Rodriguez, [address] [facility]

## (undated) NOTE — LETTER
Date: 2025      Patient Name: Suzie Ferris      : 1975        Thank you for choosing [facility] as your health care provider. Your physician has deemed the following medical service(s) necessary. However, your insurance plan may not pay for all of your health care and costs and may deny payment for this service. The fact that your insurance plan does not pay for an item or service does not mean you should not receive it. The purpose of this form is to help you make an informed decision about whether or not you want to receive this service(s) that may not be paid for by your insurance plan.    CPT Code Description     Cost     Right knee injection with synvisc       _________ ______________________________ _____________      _________ ______________________________ _____________      I understand that the above mentioned service(s) or supply may not be covered by my insurance company. I agree to be financially responsible for the cost of this service or supply in the event of my insurance denies payment as a non-covered benefit.        ______________________________________________________________________  Signature of Patient or Patient's Representative  Relationship  Date    ______________________________________________________________________  Signature of Witness to signing of form   Printed Name

## (undated) NOTE — Clinical Note
Dear Harriett Portillo,  Thank you for sending Gavin Rangel to see me for electrodiagnostic consultation. I appreciate your confidence in me to care for your patients. Please feel free call me with any questions at [phone] or contact me through [employer].   Sincerely, Yamileth Smith MD Board Certified, Physical Medicine and Rehabilitation Specializing in [facility], Spine Medicine and [facility]

## (undated) NOTE — ED AVS SNAPSHOT
Pallavi Peterson   MRN: [id number]    Department:  [facility] Emergency Department   Date of Visit:  6/15/2018           Disclosure     Insurance plans vary and the physician(s) referred by the ER may not be covered by your plan.  Please contact within the next three months to obtain basic health screening including reassessment of your blood pressure.     IF THERE IS ANY CHANGE OR WORSENING OF YOUR CONDITION, CALL YOUR PRIMARY CARE PHYSICIAN AT ONCE OR RETURN IMMEDIATELY TO THE EMERGENCY DEPARTMEN

## (undated) NOTE — LETTER
2017    Patient: Alison Alcocer  : 1975 Visit date: 2017    Dear  Dr. Bernadine Conley MD,    fabbyraymundo Tariq is a pleasant but unfortunate 43year old,, female, who was referred to us for weight management recommendations.   Jabier Tariq is in

## (undated) NOTE — LETTER
AUTHORIZATION FOR SURGICAL OPERATION OR OTHER PROCEDURE    1. I hereby authorize Dr. Resendez , and [facility] staff assigned to my case to perform the following operation and/or procedure at the [facility] site:    _________________Cortisone injection left foot_________________________________      _______________________________________________________________________________________________    2.  My physician has explained the nature and purpose of the operation or other procedure, possible alternative methods of treatment, the risks involved, and the possibility of complication to me.  I acknowledge that no guarantee has been made as to the result that may be obtained.  3.  I recognize that, during the course of this operation, or other procedure, unforseen conditions may necessitate additional or different procedure than those listed above.  I, therefore, further authorize and request that the above named physician, his/her physician assistants or designees perform such procedures as are, in his/her professional opinion, necessary and desirable.  4.  Any tissue or organs removed in the operation or other procedure may be disposed of by and at the discretion of the [facility] and [facility].  5.  I understand that in the event of a medical emergency, I will be transported by local paramedics to [facility] or other hospital emergency department.  6.  I certify that I have read and fully understand the above consent to operation and/or other procedure.    7.  I acknowledge that my physician has explained sedation/analgesia administration to me including the risks and benefits.  I consent to the administration of sedation/analgesia as may be necessary or desirable in the judgement of my physician.    Witness signature: ___________________________________________________ Date:  ______/______/_____                    Time:  ________ MAGALI PELLETIER        Patient Name:  ______________________________________________________  (please print)      Patient signature:  ___________________________________________________             Relationship to Patient:           []  Parent    Responsible person                          []  Spouse  In case of minor or                    [] Other  _____________   Incompetent name:  __________________________________________________                               (please print)      _____________      Responsible person  In case of minor or  Incompetent signature:  _______________________________________________    Statement of Physician  My signature below affirms that prior to the time of the procedure, I have explained to the patient and/or his/her guardian, the risks and benefits involved in the proposed treatment and any reasonable alternative to the proposed treatment.  I have also explained the risks and benefits involved in the refusal of the proposed treatment and have answered the patient's questions.                        Date:  ______/______/_______  Provider                      Signature:  __________________________________________________________       Time:  ___________ A.M    P.M.

## (undated) NOTE — LETTER
Date: 6/20/2019    Patient Name: Angeles Nichole          To Whom it may concern: The above patient was seen at the [facility] for treatment of a medical condition in 1/2019. She can return to work in full duty starting 3/25/2019.

## (undated) NOTE — Clinical Note
Dear Edwin,  I had the opportunity to see your patient Suzie Ferris recently. I appreciate your confidence in me to care for your patients. Please feel free call me with any questions at [phone] or contact me through [employer].  Sincerely, Uday Cantu MD Board Certified, Physical Medicine and Rehabilitation Specializing in Sports Medicine, Spine Medicine and Electrodiagnostic Medicine [facility]